# Patient Record
Sex: FEMALE | Race: BLACK OR AFRICAN AMERICAN | NOT HISPANIC OR LATINO | Employment: FULL TIME | URBAN - METROPOLITAN AREA
[De-identification: names, ages, dates, MRNs, and addresses within clinical notes are randomized per-mention and may not be internally consistent; named-entity substitution may affect disease eponyms.]

---

## 2019-04-17 ENCOUNTER — APPOINTMENT (OUTPATIENT)
Dept: WOMENS IMAGING | Facility: HOSPITAL | Age: 49
End: 2019-04-17

## 2019-04-17 PROCEDURE — 77067 SCR MAMMO BI INCL CAD: CPT | Performed by: RADIOLOGY

## 2019-04-17 PROCEDURE — 77063 BREAST TOMOSYNTHESIS BI: CPT | Performed by: RADIOLOGY

## 2019-05-13 ENCOUNTER — APPOINTMENT (OUTPATIENT)
Dept: WOMENS IMAGING | Facility: HOSPITAL | Age: 49
End: 2019-05-13

## 2019-05-13 PROCEDURE — 77065 DX MAMMO INCL CAD UNI: CPT | Performed by: RADIOLOGY

## 2019-05-13 PROCEDURE — G0279 TOMOSYNTHESIS, MAMMO: HCPCS | Performed by: RADIOLOGY

## 2019-05-13 PROCEDURE — 77061 BREAST TOMOSYNTHESIS UNI: CPT | Performed by: RADIOLOGY

## 2019-05-13 PROCEDURE — 76641 ULTRASOUND BREAST COMPLETE: CPT | Performed by: RADIOLOGY

## 2019-06-25 ENCOUNTER — APPOINTMENT (OUTPATIENT)
Dept: WOMENS IMAGING | Facility: HOSPITAL | Age: 49
End: 2019-06-25

## 2019-06-25 PROCEDURE — 19083 BX BREAST 1ST LESION US IMAG: CPT | Performed by: RADIOLOGY

## 2019-06-25 PROCEDURE — 19081 BX BREAST 1ST LESION STRTCTC: CPT | Performed by: RADIOLOGY

## 2019-06-26 ENCOUNTER — TELEPHONE (OUTPATIENT)
Dept: SURGERY | Facility: CLINIC | Age: 49
End: 2019-06-26

## 2019-07-01 ENCOUNTER — TELEPHONE (OUTPATIENT)
Dept: SURGERY | Facility: CLINIC | Age: 49
End: 2019-07-01

## 2019-07-01 DIAGNOSIS — C50.412 MALIGNANT NEOPLASM OF UPPER-OUTER QUADRANT OF LEFT BREAST IN FEMALE, ESTROGEN RECEPTOR POSITIVE (HCC): Primary | ICD-10-CM

## 2019-07-01 DIAGNOSIS — Z17.0 MALIGNANT NEOPLASM OF UPPER-OUTER QUADRANT OF LEFT BREAST IN FEMALE, ESTROGEN RECEPTOR POSITIVE (HCC): Primary | ICD-10-CM

## 2019-07-01 NOTE — TELEPHONE ENCOUNTER
Scheduled Bilateral Breast Mri wo w contrast  [facility] 7-8-19 arrive 8:15    Spoke to pt she v/u with appt  sylvial

## 2019-07-08 ENCOUNTER — TELEPHONE (OUTPATIENT)
Dept: SURGERY | Facility: CLINIC | Age: 49
End: 2019-07-08

## 2019-07-08 ENCOUNTER — HOSPITAL ENCOUNTER (OUTPATIENT)
Dept: MRI IMAGING | Facility: HOSPITAL | Age: 49
Discharge: HOME | End: 2019-07-08
Admitting: SURGERY

## 2019-07-08 DIAGNOSIS — Z17.0 MALIGNANT NEOPLASM OF UPPER-OUTER QUADRANT OF LEFT BREAST IN FEMALE, ESTROGEN RECEPTOR POSITIVE (HCC): ICD-10-CM

## 2019-07-08 DIAGNOSIS — C50.412 MALIGNANT NEOPLASM OF UPPER-OUTER QUADRANT OF LEFT BREAST IN FEMALE, ESTROGEN RECEPTOR POSITIVE (HCC): ICD-10-CM

## 2019-07-08 PROCEDURE — 0 GADOBENATE DIMEGLUMINE 529 MG/ML SOLUTION: Performed by: SURGERY

## 2019-07-08 PROCEDURE — 77049 MRI BREAST C-+ W/CAD BI: CPT

## 2019-07-08 PROCEDURE — A9577 INJ MULTIHANCE: HCPCS | Performed by: SURGERY

## 2019-07-08 RX ADMIN — GADOBENATE DIMEGLUMINE 16 ML: 529 INJECTION, SOLUTION INTRAVENOUS at 09:55

## 2019-07-12 ENCOUNTER — OFFICE VISIT (OUTPATIENT)
Dept: SURGERY | Facility: CLINIC | Age: 49
End: 2019-07-12

## 2019-07-12 ENCOUNTER — PREP FOR SURGERY (OUTPATIENT)
Dept: OTHER | Facility: HOSPITAL | Age: 49
End: 2019-07-12

## 2019-07-12 ENCOUNTER — TELEPHONE (OUTPATIENT)
Dept: SURGERY | Facility: CLINIC | Age: 49
End: 2019-07-12

## 2019-07-12 ENCOUNTER — TRANSCRIBE ORDERS (OUTPATIENT)
Dept: SURGERY | Facility: CLINIC | Age: 49
End: 2019-07-12

## 2019-07-12 VITALS
OXYGEN SATURATION: 99 % | SYSTOLIC BLOOD PRESSURE: 118 MMHG | BODY MASS INDEX: 24.88 KG/M2 | WEIGHT: 168 LBS | HEART RATE: 82 BPM | HEIGHT: 69 IN | DIASTOLIC BLOOD PRESSURE: 66 MMHG

## 2019-07-12 DIAGNOSIS — C50.412 MALIGNANT NEOPLASM OF UPPER-OUTER QUADRANT OF LEFT BREAST IN FEMALE, ESTROGEN RECEPTOR POSITIVE (HCC): Primary | ICD-10-CM

## 2019-07-12 DIAGNOSIS — Z17.0 MALIGNANT NEOPLASM OF UPPER-OUTER QUADRANT OF LEFT BREAST IN FEMALE, ESTROGEN RECEPTOR POSITIVE (HCC): Primary | ICD-10-CM

## 2019-07-12 DIAGNOSIS — Z80.3 FH: BREAST CANCER: ICD-10-CM

## 2019-07-12 DIAGNOSIS — R92.1 BREAST CALCIFICATIONS ON MAMMOGRAM: ICD-10-CM

## 2019-07-12 DIAGNOSIS — N60.92 ATYPICAL DUCTAL HYPERPLASIA OF LEFT BREAST: ICD-10-CM

## 2019-07-12 PROCEDURE — 99244 OFF/OP CNSLTJ NEW/EST MOD 40: CPT | Performed by: SURGERY

## 2019-07-12 RX ORDER — MULTIVITAMIN
1 TABLET ORAL DAILY
COMMUNITY

## 2019-07-12 RX ORDER — CEFAZOLIN SODIUM 2 G/100ML
2 INJECTION, SOLUTION INTRAVENOUS ONCE
Status: CANCELLED | OUTPATIENT
Start: 2019-07-12 | End: 2019-07-12

## 2019-07-12 RX ORDER — SODIUM CHLORIDE, SODIUM LACTATE, POTASSIUM CHLORIDE, CALCIUM CHLORIDE 600; 310; 30; 20 MG/100ML; MG/100ML; MG/100ML; MG/100ML
100 INJECTION, SOLUTION INTRAVENOUS CONTINUOUS
Status: CANCELLED | OUTPATIENT
Start: 2019-08-15

## 2019-07-12 RX ORDER — DIAZEPAM 5 MG/1
10 TABLET ORAL ONCE
Status: CANCELLED | OUTPATIENT
Start: 2019-08-15 | End: 2019-07-12

## 2019-07-12 NOTE — TELEPHONE ENCOUNTER
Emailed Edilia to contact patient.    Scheduled Consult with Dr Levine  7-15-19 @ 10:00    Scheduled Surgery Main OR  8-15-19  Lt Total Mastectomy, Lt Axilla SN Bx, Possible Node Dissection,  Immediate Reconstruction, Dr. Levine      Arrive              7:30        [facility]                    9:30  Surgery         10:30      PAT  8-5-19  @ 9:30      Return to see Dr SANCHEZ 8-27-19 arrive 8:15    Lm for Mikala santana to see if she can assist.    kdl    Mikala santana to assist.    Pt v/u with appts  kdl

## 2019-07-12 NOTE — PROGRESS NOTES
Chief Complaint: Екатерина Strauss is a 49 y.o. female who was seen in consultation at the request of  Jim Rodríguez II, MD   for newly diagnosed breast cancer    History of Present Illness:  Patient presents with newly diagnosed breast cancer, LEFT breast She noted no new masses, skin changes, nipple discharge, nipple changes prior to her most recent imaging.  Her most recent imaging includes the followin2019  [facility]   Mammo  Екатерина Strauss  Seen for yearly screening.  BILATERAL SCREENING MAMMOGRAM WITH TOMOSYNTHESIS  Heterogeneously dense.  Finding 1: Focal asymmetry 12 millimeters with multiple associated calcifications grouped distribution and architectural distortion middle one-third 12:30 left breast located 6 centimeters from the nipple. New since prior.  Finding 2: Stable area of asymmetric breast tissue posterior one-third lateral region of the right breast. No significant change.  IMPRESSION:  Finding 1: Left breast.  Finding 2: Benign-negative.  BIRADS Category 0    2019  [facility]   Radiology  Екатерина Strauss  LEFT DIAGNOSTIC MAMMOGRAM WITH TOMOSYNTHESIS  Heterogeneously dense.  Finding 1: Focal asymmetry in the left breast, 12:30. New focal asymmetry measuring 12 millimeters with multiple associated indistinct calcifications with grouped distribution and architectural distortion in the middle one-third 12:30 left breast located 4 centimeters from the nipple.  Finding 2: Several fine granular and indistinct calcifications measuring 7 millimeters middle one-third of the left breast at 12-1:00, central located 6 centimeters from the nipple. 4 cm inferior to the dominant finding described above.  Finding 3: Several groups of indistinct calcifications seen in the middle one-third of the left breast at 12-1:00, central. Flank the findings described above, spanning 6 to 7 cm in CC dimension.  LEFT BREAST ULTRASOUND  Finding 1: Irregular solid mass with  indistinct margins measuring 11 x 8 x 10 mm.  Finding 4: Oval very small complicated cyst 4 x 2 x 4 mm left breast at 4:00 located 3 centimeters from the nipple.  Finding 5: Oval very small simple cyst, 4 millimeters left breast at 11:00.  Finding 6: Oval complicated cyst 5 x 3 x 4 mm left breast at 12:00 located 2 centimeters from the nipple.  IMPRESSION:  Finding 1: Mass left breast is suspicious.  Finding 2: Calcifications left breast at 12-1:00.  Finding 3: Calcifications middle one-third of the left breast at 12-1:00, suspicious.  Finding 4: Complicated cyst breast at 4:00 located 3 centimeters from the nipple is suspicious.  Finding 5: Left breast at 11:00 benign-negative.  Finding 6: Complicated cyst in the left breast at 12:00 located 2 centimeters from the nipple is suspicious.  BIRADS Category 4C    07/08/2019  [facility]    Radiology  Екатерина Strauss  12:00 left breast middle third on 6 cm from the nipple metallic clip centrally located within an irregular mass 1.5 cm anterior to posterior, 0.8 cm superior to inferior 1.2 cm medial lateral. The metallic clip represents the most posterior of the 2 barrel-shaped metallic clips. The more anterior barrel-shaped clips is located within a post biopsy hematoma cavity.  3:00 middle third 5 cm from the nipple is a metallic clip within postbiopsy hematoma cavity. This represents the top hat shaped metallic clip. No suspicious residual enhancement. The hematoma measures 4.0 cm.  No other areas of abnormal enhancement left breast. No left axillary internal mammary chain adenopathy.  Scattered enhancement right breast. 0.8 cm oval circumscribed weakly enhancing mass consistent with a fibroadenoma retroareolar region of the right breast at the 6:00.      She had a biopsy on the following day that showed:     06/25/2019  [facility]   Radiology  Екатерина Strauss  LEFT UTLRASOUND GUIDED BIOPSY  12:00 position left breast.  12-gauge. 20 cores. Minicork  shaped s-anjum tissue marker was placed.  ADDENDUM:  Pathology is concordant.  That this mass was marked with two “barrel” shaped clip.  The mammogram demonstrated several similar groups of calcifications covering a length of approximately 8 cm. Recommend a breast MRI.  In addition the patient had a small nodule that appeared to possibly be a complicated cyst that was attempted to be aspirated but it would not aspirate. It had overall rather benign features. If it was thought to be helpful by biopsying any of the remaining groups of microcalcifcations or this small solid nodule if it would  we would be very happy to do so.  Please note that initially there was a small complex appearing nodule at the 12:00 position for which aspiration was recommended but this appeared to have been sampled with the stereotactic biopsy as it greatly diminished in size. The small nodule at the 4:00 position was solid and fairly benign in appearance and it was elected to do a six month follow up and again if it would  I would be happy to do a core biopsy of this.    06/25/2019  [facility]   Biopsy   Екатерина Strauss  Left breast at the 12:00. 12 core needle biopsy specimens were obtained using a 9-gauge vacuum assisted device. Top hat shaped s-anjum Eviva tissue marker was deployed.  Pathology calcifications at the 12-1:00 represents (ADH). This is concordant.  Ultrasound guided core biopsy of a mass at the 12:30. These areas are approximately 3 cm apart in the ML projection and 2 cm apart in the CC projection. The patient also had a complicated cyst at the 12:00 position was recommended for aspiration but prior to the attempted aspiration the cyst was no longer present and it was thought to have been removed with the stereotactic biopsy. There was a small complicated appearing cyst at the 4:00 position that was attempted to be aspirated but it now seemed to be solid and was left for a follow  up ultrasound in six months. I would recommend a breast MRI.    06/25/2019  [facility]     Pathology  Екатерина Strauss  DIAGNOSIS  1. Left Breast, 12-1:00, Core Biopsies:  - Single Focus of Atypical Ductal Hyperplasia (ADH), Solid and Cribriform Type, 0.1 cm in Dimension.  - Florid Hyperplasia of Usual Type, Columnar Cell Hyperplasia, Apocrine Change, and Fibrocystic Changes are Also Present.  - Microcalcifications Are Present in Fibroproliferative Changes and in ADH.  2. Left Breast, 12:30, Core Biopsies:  - Invasive Mammary Carcinoma, No Special Type (Ductal), Low Grade, Measuring at Least 0.9 cm in Dimension with Limited Associated Low Grade Cribriform Ductal Carcinoma in Situ (DCIS).  - Microcalcifications Are Present Within the Invasive Carcinoma and in Adjacent Benign Breast Parenchyma with Fibrocystic Changes.  Low grade morphology (tubules = 1, nuclear atypia = 2, and mitoses = 1).    06/25/2019  [facility]     Marker Analysis Екатерина Strauss   ER - 90.78%  MA - 94.21%  HER2 - 1+  Ki-67 - 5.7%    She has not had a breast biopsy in the past.  She has her ovaries, had her uterus removed for bleeding, and takes nor hormones.  Her family history includes the following:   She has 1 daughter, 1 son, no sisters, one paternal aunt, 3 maternal aunts.  She has a paternal great aunt who had breast cancer at uncertain age.  She is here for evaluation.    Review of Systems:  Review of Systems   All other systems reviewed and are negative.       Past Medical and Surgical History:  Breast Biopsy History:  Patient had not had a breast biopsy prior to her cancer diagnosis.  Breast Cancer HIstory:  Patient does not have a past medical history of breast cancer.  Breast Operations, and year:  NONE   Obstetric/Gynecologic History:  Age menstrual periods began: 10  Patient is postmenopausal due to removal of her uterus in the following year: 2008   Number of pregnancies: 4  Number of live births: 2  Number of abortions or miscarriages: 2  Age  "of delivery of first child: 23  Patient breast fed, for the following lenth of time: 3-4 MONTHS PER CHILD.   Length of time taking birth control pills: 20 YRS.   Patient has never taken hormone replacement  UTERUS REMOVED 2008.     Past Surgical History:   Procedure Laterality Date   • BREAST BIOPSY     • DILATATION AND CURETTAGE     • FOOT SURGERY     • HYSTERECTOMY     • TIBIA FRACTURE SURGERY     • WISDOM TOOTH EXTRACTION         Past Medical History:   Diagnosis Date   • Fibrocystic breast    • Seasonal allergies        Prior Hospitalizations, other than for surgery or childbirth, and year:  NONE     Social History     Socioeconomic History   • Marital status: Single     Spouse name: Not on file   • Number of children: Not on file   • Years of education: Not on file   • Highest education level: Not on file   Tobacco Use   • Smoking status: Never Smoker   • Smokeless tobacco: Never Used   Substance and Sexual Activity   • Alcohol use: Yes     Comment: 3 PER MONTH-SOCIALLY   • Drug use: No     Patient is .  Patient is employed full time with the following occupation: CUSTOMER SERVICE  Patient drinks 2 servings of caffeine per day.    Family History:  Family History   Problem Relation Age of Onset   • Diabetes Mother    • Cancer Maternal Aunt         OF UNKNOWN ORGIN   • Diabetes Maternal Aunt    • Breast cancer Paternal Aunt         PATERNAL GREAT AUNT-UNKNOLWN AGE    • Lung cancer Maternal Grandmother    • Brain cancer Maternal Grandmother        Vital Signs:  /66 (BP Location: Right arm, Patient Position: Sitting, Cuff Size: Adult)   Pulse 82   Ht 174 cm (68.5\")   Wt 76.2 kg (168 lb)   LMP  (LMP Unknown)   SpO2 99%   Breastfeeding? No   BMI 25.17 kg/m²      Medications:    Current Outpatient Medications:   •  loratadine-pseudoephedrine (CLARITIN-D 24 HOUR)  MG per 24 hr tablet, Take 1 tablet by mouth Daily., Disp: , Rfl:   •  Multiple Vitamin (MULTIVITAMIN) tablet, Take 1 tablet by " "mouth Daily., Disp: , Rfl:      Allergies:  Allergies   Allergen Reactions   • Codeine Rash     As a child, does not recall the severity of the reaction       Physical Examination:  /66 (BP Location: Right arm, Patient Position: Sitting, Cuff Size: Adult)   Pulse 82   Ht 174 cm (68.5\")   Wt 76.2 kg (168 lb)   LMP  (LMP Unknown)   SpO2 99%   Breastfeeding? No   BMI 25.17 kg/m²   General Appearance:  Patient is in no distress.  She is well kept and has an thin build.   Psychiatric:  Patient with appropriate mood and affect. Alert and oriented to self, time, and place.    Breast, RIGHT:  medium sized, 34D, symmetric with the contralateral side.  Breast skin is without erythema, edema, rashes.  There are no visible abnormalities upon inspection during the arm-raising maneuver or with hands on hips in the sitting position. There is no nipple retraction, discharge or nipple/areolar skin changes.There are no masses palpable in the sitting or supine positions.    Breast, LEFT:  medium sized, 34D,  symmetric with the contralateral side.  Breast skin is without erythema, edema, rashes.  There are no visible abnormalities upon inspection during the arm-raising maneuver or with hands on hips in the sitting position. There is no nipple retraction, discharge or nipple/areolar skin changes.there are palpable hematomas left breast 2:00 inner ring.  There are 2 well-healed mammotomy's from her recent percutaneous biopsy site.  The area of biopsy and hematoma measures 6 cm at the 12-3 o'clock inner to middle ring location.  There are no other masses palpable in the sitting or supine positions.    Lymphatic:  There is no axillary, cervical, infraclavicular, or supraclavicular adenopathy bilaterally.  Eyes:  Pupils are round and reactive to light.  Cardiovascular:  Heart rate and rhythm are regular.  Respiratory:  Lungs are clear bilaterally with no crackles or wheezes in any lung field.  Gastrointestinal:  Abdomen is " soft, nondistended, and nontender. There are no scars from previous surgery.    Musculoskeletal:  Good strength in all 4 extremities.   There is good range of motion in both shoulders.    Skin:  No new skin lesions or rashes on the skin excluding the breast (see breast exam above).        Imagin2016  [facility]   Mammo  Екатеринаnile Adamsrer  BILATERAL MAMMOGRAPHY  Heterogeneously dense.  Finding 1: Stable area of asymmetric breast tissue with associated punctate calcifications regional distribution superior region of the left breast. No significant change.  Finding 2: There stable areas of asymmetric breast tissue seen in both breasts.  BIRADS Category 2, Benign.    2019  [facility]   Mammo  Екатерина Strauss  Seen for yearly screening.  BILATERAL SCREENING MAMMOGRAM WITH TOMOSYNTHESIS  Heterogeneously dense.  Finding 1: Focal asymmetry 12 millimeters with multiple associated calcifications grouped distribution and architectural distortion middle one-third 12:30 left breast located 6 centimeters from the nipple. New since prior.  Finding 2: Stable area of asymmetric breast tissue posterior one-third lateral region of the right breast. No significant change.  IMPRESSION:  Finding 1: Left breast.  Finding 2: Benign-negative.  BIRADS Category 0    2019  [facility]   Radiology  Екатеринаnile Strauss  LEFT DIAGNOSTIC MAMMOGRAM WITH TOMOSYNTHESIS  Heterogeneously dense.  Finding 1: Focal asymmetry in the left breast, 12:30. New focal asymmetry measuring 12 millimeters with multiple associated indistinct calcifications with grouped distribution and architectural distortion in the middle one-third 12:30 left breast located 4 centimeters from the nipple.  Finding 2: Several fine granular and indistinct calcifications measuring 7 millimeters middle one-third of the left breast at 12-1:00, central located 6 centimeters from the nipple. 4 cm inferior to the dominant finding described  above.  Finding 3: Several groups of indistinct calcifications seen in the middle one-third of the left breast at 12-1:00, central. Flank the findings described above, spanning 6 to 7 cm in CC dimension.  LEFT BREAST ULTRASOUND  Finding 1: Irregular solid mass with indistinct margins measuring 11 x 8 x 10 mm.  Finding 4: Oval very small complicated cyst 4 x 2 x 4 mm left breast at 4:00 located 3 centimeters from the nipple.  Finding 5: Oval very small simple cyst, 4 millimeters left breast at 11:00.  Finding 6: Oval complicated cyst 5 x 3 x 4 mm left breast at 12:00 located 2 centimeters from the nipple.  IMPRESSION:  Finding 1: Mass left breast is suspicious.  Finding 2: Calcifications left breast at 12-1:00.  Finding 3: Calcifications middle one-third of the left breast at 12-1:00, suspicious.  Finding 4: Complicated cyst breast at 4:00 located 3 centimeters from the nipple is suspicious.  Finding 5: Left breast at 11:00 benign-negative.  Finding 6: Complicated cyst in the left breast at 12:00 located 2 centimeters from the nipple is suspicious.  BIRADS Category 4C    07/08/2019  [facility]    Radiology  Екатерина Strauss  12:00 left breast middle third on 6 cm from the nipple metallic clip centrally located within an irregular mass 1.5 cm anterior to posterior, 0.8 cm superior to inferior 1.2 cm medial lateral. The metallic clip represents the most posterior of the 2 barrel-shaped metallic clips. The more anterior barrel-shaped clips is located within a post biopsy hematoma cavity.  3:00 middle third 5 cm from the nipple is a metallic clip within postbiopsy hematoma cavity. This represents the top hat shaped metallic clip. No suspicious residual enhancement. The hematoma measures 4.0 cm.  No other areas of abnormal enhancement left breast. No left axillary internal mammary chain adenopathy.  Scattered enhancement right breast. 0.8 cm oval circumscribed weakly enhancing mass consistent with a fibroadenoma  retroareolar region of the right breast at the 6:00.    Pathology:    06/25/2019  [facility]   Radiology  Екатерина Strauss  LEFT UTLRASOUND GUIDED BIOPSY  12:00 position left breast.  12-gauge. 20 cores. Minicork shaped s-anjum tissue marker was placed.  ADDENDUM:  Pathology is concordant.  That this mass was marked with two “barrel” shaped clip.  The mammogram demonstrated several similar groups of calcifications covering a length of approximately 8 cm. Recommend a breast MRI.  In addition the patient had a small nodule that appeared to possibly be a complicated cyst that was attempted to be aspirated but it would not aspirate. It had overall rather benign features. If it was thought to be helpful by biopsying any of the remaining groups of microcalcifcations or this small solid nodule if it would  we would be very happy to do so.  Please note that initially there was a small complex appearing nodule at the 12:00 position for which aspiration was recommended but this appeared to have been sampled with the stereotactic biopsy as it greatly diminished in size. The small nodule at the 4:00 position was solid and fairly benign in appearance and it was elected to do a six month follow up and again if it would  I would be happy to do a core biopsy of this.    06/25/2019  [facility]   Biopsy   Екатерина Strauss  Left breast at the 12:00. 12 core needle biopsy specimens were obtained using a 9-gauge vacuum assisted device. Top hat shaped s-anjum Eviva tissue marker was deployed.  Pathology calcifications at the 12-1:00 represents (ADH). This is concordant.  Ultrasound guided core biopsy of a mass at the 12:30. These areas are approximately 3 cm apart in the ML projection and 2 cm apart in the CC projection. The patient also had a complicated cyst at the 12:00 position was recommended for aspiration but prior to the attempted aspiration the cyst was no longer present and  it was thought to have been removed with the stereotactic biopsy. There was a small complicated appearing cyst at the 4:00 position that was attempted to be aspirated but it now seemed to be solid and was left for a follow up ultrasound in six months. I would recommend a breast MRI.    06/25/2019  [facility]     Pathology  Екатерина Waldroperson  DIAGNOSIS  3. Left Breast, 12-1:00, Core Biopsies:  - Single Focus of Atypical Ductal Hyperplasia (ADH), Solid and Cribriform Type, 0.1 cm in Dimension.  - Florid Hyperplasia of Usual Type, Columnar Cell Hyperplasia, Apocrine Change, and Fibrocystic Changes are Also Present.  - Microcalcifications Are Present in Fibroproliferative Changes and in ADH.  4. Left Breast, 12:30, Core Biopsies:  - Invasive Mammary Carcinoma, No Special Type (Ductal), Low Grade, Measuring at Least 0.9 cm in Dimension with Limited Associated Low Grade Cribriform Ductal Carcinoma in Situ (DCIS).  - Microcalcifications Are Present Within the Invasive Carcinoma and in Adjacent Benign Breast Parenchyma with Fibrocystic Changes.  Low grade morphology (tubules = 1, nuclear atypia = 2, and mitoses = 1).    06/25/2019  [facility]     Marker Analysis Екатерина Carlos A   ER - 90.78%  [place] - 94.21%  HER2 - 1+  Ki-67 - 5.7%    Procedures:      Assessment:   Diagnosis Plan   1. Malignant neoplasm of upper-outer quadrant of left breast in female, estrogen receptor positive (CMS/HCC)     2. Atypical ductal hyperplasia of left breast     3. Breast calcifications on mammogram     4. FH: breast cancer     1-3   LEFT breast biopsy sites on bedside ultrasound at the LEFT 12:00, 5 CFN location and at the LEFT 2:00, 4 CFN location.     At LEFT breast 12:00, 5 CFN location- Initially asymetry and 1.5 cm calcifications on mammo, 1.1 cm mass on ultrasound.  X2- MRI enhancement at the more posterior barrel measuring 1.5 cm  IMC, NST, low grade, 1,2,1, 9mm, associated low grade DCIS,   ER91, [place] 94, her 2 artemio 1+, ki 67 is 5.7%.    2-LEFT  breast 2:00, 4 CFN location- calcifications  4 cm inferior to above- tophat marker--single focus ADH, solid and cribiform, 1mm, usual hyerplasia, CCC, apocrine chagnes, FCC    - Note additional calcs int he same region (absent on contralateral breast )spanning 8 cm total.    Clinical stage T1cN0- IA   (possibly multifocal, with at least atypia spanning 5 cm from clip to clip)    4-  PGA age uncertain        Plan:  Екатерина, her daughter Jenna and I reviewed her history, imaging, imaging reports and exam together today.    We reviewed the difference in systemic therapy versus locoregional. She knows that she will be seeing a medical oncologist in the adjuvant setting. We discussed that for early stage breast cancer, there are 2 options for locoregional treatment that have equivalent survival: total mastectomy versus lumpectomy and radiation, either with sentinel node biopsy. Based on her imaging and examination, we discussed that a unilateral mastectomy with or without reconstruction would be the recommended surgical treatment. She understands the risks of mastectomy including bleeding, infection, seroma and chance of skin ischemia/necrosis. She understands the risks of  sentinel node biopsy, including 7% chance of lymphedema, injury to nerves or vessels in the axilla,  seroma. We discussed that we will proceed with a frozen section analysis of the sentinel node in the operating room, and if any positivity, would proceed with a completion axillary dissection at that time.    LEFt total mastectomy, LEFT axilla sentinel node biopsy, possible axillary node dissection, immediate recosntruction    We discussed her having a plastic surgical consultation in the preoperative period, and have arranged that today.   She is interested in a TRAM flap she thinks, over implant.      NCCN guidelines have been followed.  We gave her EMLA cream and tegederm for her sentinel node procedure, and arranged for her to see our nurse  navigator.   She will receive a recommendation about radiation after surgery. I am hopeful that she will not need radiation.    We have sent genetics due to her young age and FH and will see this preoperatively.        Sarah Barker MD        We have spent 60 minutes in visit today, 45 in face to face .      Next Appointment:  Return for surgery.      EMR Dragon/transcription disclaimer:    Much of this encounter note is an electronic transcription/translocation of spoken language to printed text.  The electronic translation of spoken language may permit erroneous, or at times, nonsensical words or phrases to be inadvertently transcribed.  Although I have reviewed the note from such areas, some may still exist.

## 2019-07-15 ENCOUNTER — TELEPHONE (OUTPATIENT)
Dept: SURGERY | Facility: CLINIC | Age: 49
End: 2019-07-15

## 2019-07-15 NOTE — TELEPHONE ENCOUNTER
EMLA Cream 30 g tube no refills   Apply topically once for one dose to affected nipple, outer edge of affected nipple and cover day of surgery before leaving home.     Bringing remaining cream with to hospital day of surgery.     [place] [id number] AT [facility] [address] - [phone]  - [phone] (Phone)     lml

## 2019-07-16 ENCOUNTER — TELEPHONE (OUTPATIENT)
Dept: SURGERY | Facility: CLINIC | Age: 49
End: 2019-07-16

## 2019-07-16 NOTE — TELEPHONE ENCOUNTER
[facility] panel 7-9-19 Breast and Gyn panel- VUS ion BARD1 [id number].    We will let her know no mutation found.

## 2019-07-17 ENCOUNTER — TELEPHONE (OUTPATIENT)
Dept: SURGERY | Facility: CLINIC | Age: 49
End: 2019-07-17

## 2019-07-17 ENCOUNTER — TELEPHONE (OUTPATIENT)
Dept: OTHER | Facility: HOSPITAL | Age: 49
End: 2019-07-17

## 2019-07-17 NOTE — TELEPHONE ENCOUNTER
Referral received from Dr. Barker's office. I called Ms. Strauss and introduced myself and navigational services. She states she is scheduled for a Mastectomy Aug 15th and will have her pre admission testing done on the Aug 5th. She was agreeable to meet afterward to go over navigational services and resources available. She has my contact number if any needs arise.

## 2019-08-05 ENCOUNTER — HOSPITAL ENCOUNTER (OUTPATIENT)
Dept: GENERAL RADIOLOGY | Facility: HOSPITAL | Age: 49
Discharge: HOME | End: 2019-08-05
Admitting: SURGERY

## 2019-08-05 ENCOUNTER — APPOINTMENT (OUTPATIENT)
Dept: PREADMISSION TESTING | Facility: HOSPITAL | Age: 49
End: 2019-08-05

## 2019-08-05 VITALS
HEART RATE: 74 BPM | BODY MASS INDEX: 25.33 KG/M2 | SYSTOLIC BLOOD PRESSURE: 115 MMHG | TEMPERATURE: 99.4 F | OXYGEN SATURATION: 100 % | RESPIRATION RATE: 20 BRPM | DIASTOLIC BLOOD PRESSURE: 75 MMHG | HEIGHT: 69 IN | WEIGHT: 171 LBS

## 2019-08-05 DIAGNOSIS — Z17.0 MALIGNANT NEOPLASM OF UPPER-OUTER QUADRANT OF LEFT BREAST IN FEMALE, ESTROGEN RECEPTOR POSITIVE (HCC): ICD-10-CM

## 2019-08-05 DIAGNOSIS — C50.412 MALIGNANT NEOPLASM OF UPPER-OUTER QUADRANT OF LEFT BREAST IN FEMALE, ESTROGEN RECEPTOR POSITIVE (HCC): ICD-10-CM

## 2019-08-05 LAB
ALBUMIN SERPL-MCNC: 4.1 G/DL (ref 3.5–5.2)
ALBUMIN/GLOB SERPL: 1.1 G/DL
ALP SERPL-CCNC: 63 U/L (ref 39–117)
ALT SERPL W P-5'-P-CCNC: 6 U/L (ref 1–33)
ANION GAP SERPL CALCULATED.3IONS-SCNC: 11.6 MMOL/L (ref 5–15)
AST SERPL-CCNC: 14 U/L (ref 1–32)
BILIRUB SERPL-MCNC: 0.4 MG/DL (ref 0.2–1.2)
BUN BLD-MCNC: 10 MG/DL (ref 6–20)
BUN/CREAT SERPL: 13 (ref 7–25)
CALCIUM SPEC-SCNC: 9.5 MG/DL (ref 8.6–10.5)
CHLORIDE SERPL-SCNC: 102 MMOL/L (ref 98–107)
CO2 SERPL-SCNC: 26.4 MMOL/L (ref 22–29)
CREAT BLD-MCNC: 0.77 MG/DL (ref 0.57–1)
DEPRECATED RDW RBC AUTO: 44.6 FL (ref 37–54)
ERYTHROCYTE [DISTWIDTH] IN BLOOD BY AUTOMATED COUNT: 13.1 % (ref 12.3–15.4)
GFR SERPL CREATININE-BSD FRML MDRD: 97 ML/MIN/1.73
GLOBULIN UR ELPH-MCNC: 3.7 GM/DL
GLUCOSE BLD-MCNC: 94 MG/DL (ref 65–99)
HCT VFR BLD AUTO: 44.3 % (ref 34–46.6)
HGB BLD-MCNC: 14.1 G/DL (ref 12–15.9)
MCH RBC QN AUTO: 29.7 PG (ref 26.6–33)
MCHC RBC AUTO-ENTMCNC: 31.8 G/DL (ref 31.5–35.7)
MCV RBC AUTO: 93.3 FL (ref 79–97)
PLATELET # BLD AUTO: 222 10*3/MM3 (ref 140–450)
PMV BLD AUTO: 11.5 FL (ref 6–12)
POTASSIUM BLD-SCNC: 4.2 MMOL/L (ref 3.5–5.2)
PROT SERPL-MCNC: 7.8 G/DL (ref 6–8.5)
RBC # BLD AUTO: 4.75 10*6/MM3 (ref 3.77–5.28)
SODIUM BLD-SCNC: 140 MMOL/L (ref 136–145)
WBC NRBC COR # BLD: 5.69 10*3/MM3 (ref 3.4–10.8)

## 2019-08-05 PROCEDURE — 36415 COLL VENOUS BLD VENIPUNCTURE: CPT

## 2019-08-05 PROCEDURE — 85027 COMPLETE CBC AUTOMATED: CPT | Performed by: SURGERY

## 2019-08-05 PROCEDURE — 71046 X-RAY EXAM CHEST 2 VIEWS: CPT

## 2019-08-05 PROCEDURE — 80053 COMPREHEN METABOLIC PANEL: CPT | Performed by: SURGERY

## 2019-08-05 NOTE — DISCHARGE INSTRUCTIONS
Take the following medications the morning of surgery with a small sip of water: NONE    PLEASE ARRIVE AT THE  HOSPITAL AT: 0730 AM    General Instructions:  • Do not eat solid food after midnight the night before surgery.  • You may drink clear liquids day of surgery but must stop at least one hour before your hospital arrival time.  • It is beneficial for you to have a clear drink that contains carbohydrates the day of surgery.  We suggest a 12 to 20 ounce bottle of Gatorade or Powerade for non-diabetic patients or a 12 to 20 ounce bottle of G2 or Powerade Zero for diabetic patients. (Pediatric patients, are not advised to drink a 12 to 20 ounce carbohydrate drink)    Clear liquids are liquids you can see through.  Nothing red in color.     Plain water                               Sports drinks  Sodas                                   Gelatin (Jell-O)  Fruit juices without pulp such as white grape juice and apple juice  Popsicles that contain no fruit or yogurt  Tea or coffee (no cream or milk added)  Gatorade / Powerade  G2 / Powerade Zero    • Infants may have breast milk up to four hours before surgery.  • Infants drinking formula may drink formula up to six hours before surgery.   • Patients who avoid smoking, chewing tobacco and alcohol for 4 weeks prior to surgery have a reduced risk of post-operative complications.  Quit smoking as many days before surgery as you can.  • Do not smoke, use chewing tobacco or drink alcohol the day of surgery.   • If applicable bring your C-PAP/ BI-PAP machine.  • Bring any papers given to you in the doctor’s office.  • Wear clean comfortable clothes and socks.  • Do not wear contact lenses, false eyelashes or make-up.  Bring a case for your glasses.   • Bring crutches or walker if applicable.  • Remove all piercings.  Leave jewelry and any other valuables at home.  • Hair extensions with metal clips must be removed prior to surgery.  • The Pre-Admission Testing nurse will  instruct you to bring medications if unable to obtain an accurate list in Pre-Admission Testing.        If you were given a blood bank ID arm band remember to bring it with you the day of surgery.    Preventing a Surgical Site Infection:  • For 2 to 3 days before surgery, avoid shaving with a razor because the razor can irritate skin and make it easier to develop an infection.    • Any areas of open skin can increase the risk of a post-operative wound infection by allowing bacteria to enter and travel throughout the body.  Notify your surgeon if you have any skin wounds / rashes even if it is not near the expected surgical site.  The area will need assessed to determine if surgery should be delayed until it is healed.  • The night prior to surgery sleep in a clean bed with clean clothing.  Do not allow pets to sleep with you.  • Shower on the morning of surgery using a fresh bar of anti-bacterial soap (such as Dial) and clean washcloth.  Dry with a clean towel and dress in clean clothing.  • Ask your surgeon if you will be receiving antibiotics prior to surgery.  • Make sure you, your family, and all healthcare providers clean their hands with soap and water or an alcohol based hand  before caring for you or your wound.    Day of surgery:  Upon arrival, a Pre-op nurse and Anesthesiologist will review your health history, obtain vital signs, and answer questions you may have.  The only belongings needed at this time will be a list of your home medications and if applicable your C-PAP/BI-PAP machine.  If you are staying overnight your family can leave the rest of your belongings in the car and bring them to your room later.  A Pre-op nurse will start an IV and you may receive medication in preparation for surgery, including something to help you relax.  Your family will be able to see you in the Pre-op area.  While you are in surgery your family should notify the waiting room  if they leave the  waiting room area and provide a contact phone number.    Please be aware that surgery does come with discomfort.  We want to make every effort to control your discomfort so please discuss any uncontrolled symptoms with your nurse.   Your doctor will most likely have prescribed pain medications.      If you are going home after surgery you will receive individualized written care instructions before being discharged.  A responsible adult must drive you to and from the hospital on the day of your surgery and stay with you for 24 hours.    If you are staying overnight following surgery, you will be transported to your hospital room following the recovery period.  [facility] has all private rooms.    You have received a list of surgical assistants for your reference.  If you have any questions please call Pre-Admission Testing at [phone].  Deductibles and co-payments are collected on the day of service. Please be prepared to pay the required co-pay, deductible or deposit on the day of service as defined by your plan.

## 2019-08-07 ENCOUNTER — TELEPHONE (OUTPATIENT)
Dept: SURGERY | Facility: CLINIC | Age: 49
End: 2019-08-07

## 2019-08-07 NOTE — TELEPHONE ENCOUNTER
Patient scheduled for Left Mastectomy Left SLNB 8/15 with reconstruction Dr. Levine.     Patient would like additional surgical options and let me know she is considering second option.     We encourage second opinion if she is uncertain about her upcoming procedure. lml

## 2019-08-09 ENCOUNTER — TELEPHONE (OUTPATIENT)
Dept: SURGERY | Facility: CLINIC | Age: 49
End: 2019-08-09

## 2019-08-09 NOTE — TELEPHONE ENCOUNTER
Patient called me to let me know that she has decided to go with surgery as planned with us;   Lt total mastectomy, Lt SLNB, possible node dissection, immediate reconstruction Dr. Levine.     Lml

## 2019-08-13 ENCOUNTER — TELEPHONE (OUTPATIENT)
Dept: SURGERY | Facility: CLINIC | Age: 49
End: 2019-08-13

## 2019-08-15 ENCOUNTER — ANESTHESIA EVENT (OUTPATIENT)
Dept: PERIOP | Facility: HOSPITAL | Age: 49
End: 2019-08-15

## 2019-08-15 ENCOUNTER — ANESTHESIA (OUTPATIENT)
Dept: PERIOP | Facility: HOSPITAL | Age: 49
End: 2019-08-15

## 2019-08-15 ENCOUNTER — HOSPITAL ENCOUNTER (OUTPATIENT)
Facility: HOSPITAL | Age: 49
Discharge: HOME | End: 2019-08-16
Attending: SURGERY | Admitting: SURGERY

## 2019-08-15 ENCOUNTER — HOSPITAL ENCOUNTER (OUTPATIENT)
Dept: NUCLEAR MEDICINE | Facility: HOSPITAL | Age: 49
Discharge: HOME | End: 2019-08-15

## 2019-08-15 DIAGNOSIS — C50.412 MALIGNANT NEOPLASM OF UPPER-OUTER QUADRANT OF LEFT BREAST IN FEMALE, ESTROGEN RECEPTOR POSITIVE (HCC): ICD-10-CM

## 2019-08-15 DIAGNOSIS — Z17.0 MALIGNANT NEOPLASM OF UPPER-OUTER QUADRANT OF LEFT BREAST IN FEMALE, ESTROGEN RECEPTOR POSITIVE (HCC): ICD-10-CM

## 2019-08-15 PROCEDURE — 25010000002 GENTAMICIN PER 80 MG: Performed by: SURGERY

## 2019-08-15 PROCEDURE — 25010000002 PROPOFOL 10 MG/ML EMULSION: Performed by: NURSE ANESTHETIST, CERTIFIED REGISTERED

## 2019-08-15 PROCEDURE — C1789 PROSTHESIS, BREAST, IMP: HCPCS | Performed by: SURGERY

## 2019-08-15 PROCEDURE — 25010000002 DEXAMETHASONE PER 1 MG: Performed by: SURGERY

## 2019-08-15 PROCEDURE — 25010000003 CEFAZOLIN IN DEXTROSE 2-4 GM/100ML-% SOLUTION: Performed by: SURGERY

## 2019-08-15 PROCEDURE — 88360 TUMOR IMMUNOHISTOCHEM/MANUAL: CPT | Performed by: SURGERY

## 2019-08-15 PROCEDURE — 88342 IMHCHEM/IMCYTCHM 1ST ANTB: CPT | Performed by: SURGERY

## 2019-08-15 PROCEDURE — 38525 BIOPSY/REMOVAL LYMPH NODES: CPT | Performed by: SURGERY

## 2019-08-15 PROCEDURE — 25010000002 HYDROMORPHONE PER 4 MG: Performed by: NURSE ANESTHETIST, CERTIFIED REGISTERED

## 2019-08-15 PROCEDURE — 38792 RA TRACER ID OF SENTINL NODE: CPT

## 2019-08-15 PROCEDURE — 25010000003 CEFAZOLIN PER 500 MG: Performed by: SURGERY

## 2019-08-15 PROCEDURE — 88331 PATH CONSLTJ SURG 1 BLK 1SPC: CPT | Performed by: SURGERY

## 2019-08-15 PROCEDURE — 25010000002 ROPIVACAINE PER 1 MG: Performed by: SURGERY

## 2019-08-15 PROCEDURE — 25010000002 ONDANSETRON PER 1 MG: Performed by: NURSE ANESTHETIST, CERTIFIED REGISTERED

## 2019-08-15 PROCEDURE — 25010000002 FENTANYL CITRATE (PF) 100 MCG/2ML SOLUTION: Performed by: NURSE ANESTHETIST, CERTIFIED REGISTERED

## 2019-08-15 PROCEDURE — G0378 HOSPITAL OBSERVATION PER HR: HCPCS

## 2019-08-15 PROCEDURE — 25010000002 DEXAMETHASONE PER 1 MG: Performed by: NURSE ANESTHETIST, CERTIFIED REGISTERED

## 2019-08-15 PROCEDURE — A9541 TC99M SULFUR COLLOID: HCPCS | Performed by: SURGERY

## 2019-08-15 PROCEDURE — 88341 IMHCHEM/IMCYTCHM EA ADD ANTB: CPT | Performed by: SURGERY

## 2019-08-15 PROCEDURE — S0260 H&P FOR SURGERY: HCPCS | Performed by: SURGERY

## 2019-08-15 PROCEDURE — 78195 LYMPH SYSTEM IMAGING: CPT | Performed by: SURGERY

## 2019-08-15 PROCEDURE — 88307 TISSUE EXAM BY PATHOLOGIST: CPT | Performed by: SURGERY

## 2019-08-15 PROCEDURE — 19303 MAST SIMPLE COMPLETE: CPT | Performed by: SURGERY

## 2019-08-15 PROCEDURE — 0 TECHNETIUM FILTERED SULFUR COLLOID: Performed by: SURGERY

## 2019-08-15 PROCEDURE — C9290 INJ, BUPIVACAINE LIPOSOME: HCPCS | Performed by: SURGERY

## 2019-08-15 PROCEDURE — 25010000003 BUPIVACAINE LIPOSOME 1.3 % SUSPENSION 20 ML VIAL: Performed by: SURGERY

## 2019-08-15 DEVICE — SMOOTH, HIGH PROFILE, SUTURE TABS, INTEGRAL INJECTION DOME, 600CC
Type: IMPLANTABLE DEVICE | Site: BREAST | Status: FUNCTIONAL
Brand: ARTOURA BREAST TISSUE EXPANDER

## 2019-08-15 DEVICE — CLIP LIGAT VASC HORIZON TI SM/WD RED 24CT: Type: IMPLANTABLE DEVICE | Site: BREAST | Status: FUNCTIONAL

## 2019-08-15 DEVICE — CLIP LIGAT VASC HORIZON TI MD BLU 24CT: Type: IMPLANTABLE DEVICE | Site: BREAST | Status: FUNCTIONAL

## 2019-08-15 DEVICE — GRFT TISS ALLODERM RTM PERF CONTR 132SQ/CM THN MD: Type: IMPLANTABLE DEVICE | Site: BREAST | Status: FUNCTIONAL

## 2019-08-15 RX ORDER — LIDOCAINE HYDROCHLORIDE 10 MG/ML
0.5 INJECTION, SOLUTION EPIDURAL; INFILTRATION; INTRACAUDAL; PERINEURAL ONCE AS NEEDED
Status: DISCONTINUED | OUTPATIENT
Start: 2019-08-15 | End: 2019-08-15 | Stop reason: HOSPADM

## 2019-08-15 RX ORDER — PROMETHAZINE HYDROCHLORIDE 25 MG/ML
6.25 INJECTION, SOLUTION INTRAMUSCULAR; INTRAVENOUS
Status: DISCONTINUED | OUTPATIENT
Start: 2019-08-15 | End: 2019-08-15 | Stop reason: HOSPADM

## 2019-08-15 RX ORDER — BISACODYL 10 MG
10 SUPPOSITORY, RECTAL RECTAL DAILY PRN
Status: DISCONTINUED | OUTPATIENT
Start: 2019-08-15 | End: 2019-08-16 | Stop reason: HOSPADM

## 2019-08-15 RX ORDER — MAGNESIUM HYDROXIDE 1200 MG/15ML
LIQUID ORAL AS NEEDED
Status: DISCONTINUED | OUTPATIENT
Start: 2019-08-15 | End: 2019-08-15 | Stop reason: HOSPADM

## 2019-08-15 RX ORDER — NALOXONE HCL 0.4 MG/ML
0.2 VIAL (ML) INJECTION AS NEEDED
Status: DISCONTINUED | OUTPATIENT
Start: 2019-08-15 | End: 2019-08-15 | Stop reason: HOSPADM

## 2019-08-15 RX ORDER — ONDANSETRON 2 MG/ML
INJECTION INTRAMUSCULAR; INTRAVENOUS AS NEEDED
Status: DISCONTINUED | OUTPATIENT
Start: 2019-08-15 | End: 2019-08-15 | Stop reason: SURG

## 2019-08-15 RX ORDER — CYCLOBENZAPRINE HCL 10 MG
10 TABLET ORAL
Status: COMPLETED | OUTPATIENT
Start: 2019-08-15 | End: 2019-08-15

## 2019-08-15 RX ORDER — NALOXONE HCL 0.4 MG/ML
0.1 VIAL (ML) INJECTION
Status: DISCONTINUED | OUTPATIENT
Start: 2019-08-15 | End: 2019-08-16 | Stop reason: HOSPADM

## 2019-08-15 RX ORDER — SODIUM CHLORIDE 9 MG/ML
INJECTION, SOLUTION INTRAVENOUS AS NEEDED
Status: DISCONTINUED | OUTPATIENT
Start: 2019-08-15 | End: 2019-08-15 | Stop reason: HOSPADM

## 2019-08-15 RX ORDER — OXYCODONE AND ACETAMINOPHEN 7.5; 325 MG/1; MG/1
1 TABLET ORAL ONCE AS NEEDED
Status: DISCONTINUED | OUTPATIENT
Start: 2019-08-15 | End: 2019-08-15 | Stop reason: HOSPADM

## 2019-08-15 RX ORDER — SCOLOPAMINE TRANSDERMAL SYSTEM 1 MG/1
1 PATCH, EXTENDED RELEASE TRANSDERMAL
Status: DISCONTINUED | OUTPATIENT
Start: 2019-08-15 | End: 2019-08-15

## 2019-08-15 RX ORDER — HYDROMORPHONE HYDROCHLORIDE 1 MG/ML
0.5 INJECTION, SOLUTION INTRAMUSCULAR; INTRAVENOUS; SUBCUTANEOUS
Status: DISCONTINUED | OUTPATIENT
Start: 2019-08-15 | End: 2019-08-15 | Stop reason: HOSPADM

## 2019-08-15 RX ORDER — SODIUM CHLORIDE, SODIUM LACTATE, POTASSIUM CHLORIDE, CALCIUM CHLORIDE 600; 310; 30; 20 MG/100ML; MG/100ML; MG/100ML; MG/100ML
100 INJECTION, SOLUTION INTRAVENOUS CONTINUOUS
Status: DISCONTINUED | OUTPATIENT
Start: 2019-08-15 | End: 2019-08-16

## 2019-08-15 RX ORDER — HYDROCODONE BITARTRATE AND ACETAMINOPHEN 7.5; 325 MG/1; MG/1
1 TABLET ORAL ONCE AS NEEDED
Status: DISCONTINUED | OUTPATIENT
Start: 2019-08-15 | End: 2019-08-15 | Stop reason: HOSPADM

## 2019-08-15 RX ORDER — DEXAMETHASONE SODIUM PHOSPHATE 10 MG/ML
INJECTION INTRAMUSCULAR; INTRAVENOUS AS NEEDED
Status: DISCONTINUED | OUTPATIENT
Start: 2019-08-15 | End: 2019-08-15 | Stop reason: SURG

## 2019-08-15 RX ORDER — SODIUM CHLORIDE, SODIUM LACTATE, POTASSIUM CHLORIDE, CALCIUM CHLORIDE 600; 310; 30; 20 MG/100ML; MG/100ML; MG/100ML; MG/100ML
9 INJECTION, SOLUTION INTRAVENOUS CONTINUOUS
Status: DISCONTINUED | OUTPATIENT
Start: 2019-08-15 | End: 2019-08-15

## 2019-08-15 RX ORDER — LIDOCAINE AND PRILOCAINE 25; 25 MG/G; MG/G
CREAM TOPICAL ONCE
Status: COMPLETED | OUTPATIENT
Start: 2019-08-15 | End: 2019-08-15

## 2019-08-15 RX ORDER — ONDANSETRON 2 MG/ML
4 INJECTION INTRAMUSCULAR; INTRAVENOUS EVERY 6 HOURS PRN
Status: DISCONTINUED | OUTPATIENT
Start: 2019-08-15 | End: 2019-08-16 | Stop reason: HOSPADM

## 2019-08-15 RX ORDER — PROMETHAZINE HYDROCHLORIDE 25 MG/1
25 TABLET ORAL ONCE AS NEEDED
Status: DISCONTINUED | OUTPATIENT
Start: 2019-08-15 | End: 2019-08-15 | Stop reason: HOSPADM

## 2019-08-15 RX ORDER — METOPROLOL TARTRATE 5 MG/5ML
INJECTION INTRAVENOUS AS NEEDED
Status: DISCONTINUED | OUTPATIENT
Start: 2019-08-15 | End: 2019-08-15 | Stop reason: SURG

## 2019-08-15 RX ORDER — PROMETHAZINE HYDROCHLORIDE 25 MG/ML
12.5 INJECTION, SOLUTION INTRAMUSCULAR; INTRAVENOUS EVERY 6 HOURS PRN
Status: DISCONTINUED | OUTPATIENT
Start: 2019-08-15 | End: 2019-08-16 | Stop reason: HOSPADM

## 2019-08-15 RX ORDER — CELECOXIB 200 MG/1
400 CAPSULE ORAL
Status: COMPLETED | OUTPATIENT
Start: 2019-08-15 | End: 2019-08-15

## 2019-08-15 RX ORDER — HYDROXYZINE PAMOATE 50 MG/1
50 CAPSULE ORAL
Status: COMPLETED | OUTPATIENT
Start: 2019-08-15 | End: 2019-08-15

## 2019-08-15 RX ORDER — PROMETHAZINE HYDROCHLORIDE 25 MG/ML
12.5 INJECTION, SOLUTION INTRAMUSCULAR; INTRAVENOUS ONCE AS NEEDED
Status: DISCONTINUED | OUTPATIENT
Start: 2019-08-15 | End: 2019-08-15 | Stop reason: HOSPADM

## 2019-08-15 RX ORDER — CLINDAMYCIN PHOSPHATE 900 MG/50ML
900 INJECTION INTRAVENOUS
Status: COMPLETED | OUTPATIENT
Start: 2019-08-15 | End: 2019-08-15

## 2019-08-15 RX ORDER — PROPOFOL 10 MG/ML
VIAL (ML) INTRAVENOUS AS NEEDED
Status: DISCONTINUED | OUTPATIENT
Start: 2019-08-15 | End: 2019-08-15 | Stop reason: SURG

## 2019-08-15 RX ORDER — HYDROXYZINE PAMOATE 50 MG/1
50 CAPSULE ORAL 4 TIMES DAILY PRN
Status: DISCONTINUED | OUTPATIENT
Start: 2019-08-15 | End: 2019-08-16 | Stop reason: HOSPADM

## 2019-08-15 RX ORDER — DIPHENHYDRAMINE HYDROCHLORIDE 50 MG/ML
12.5 INJECTION INTRAMUSCULAR; INTRAVENOUS
Status: DISCONTINUED | OUTPATIENT
Start: 2019-08-15 | End: 2019-08-15 | Stop reason: HOSPADM

## 2019-08-15 RX ORDER — DIAZEPAM 5 MG/1
10 TABLET ORAL ONCE
Status: COMPLETED | OUTPATIENT
Start: 2019-08-15 | End: 2019-08-15

## 2019-08-15 RX ORDER — ACETAMINOPHEN 325 MG/1
650 TABLET ORAL ONCE AS NEEDED
Status: DISCONTINUED | OUTPATIENT
Start: 2019-08-15 | End: 2019-08-15 | Stop reason: HOSPADM

## 2019-08-15 RX ORDER — CLINDAMYCIN PHOSPHATE 600 MG/50ML
600 INJECTION INTRAVENOUS EVERY 8 HOURS
Status: DISCONTINUED | OUTPATIENT
Start: 2019-08-15 | End: 2019-08-16 | Stop reason: HOSPADM

## 2019-08-15 RX ORDER — CELECOXIB 200 MG/1
400 CAPSULE ORAL DAILY
Status: DISCONTINUED | OUTPATIENT
Start: 2019-08-16 | End: 2019-08-16 | Stop reason: HOSPADM

## 2019-08-15 RX ORDER — EPHEDRINE SULFATE 50 MG/ML
5 INJECTION, SOLUTION INTRAVENOUS ONCE AS NEEDED
Status: DISCONTINUED | OUTPATIENT
Start: 2019-08-15 | End: 2019-08-15 | Stop reason: HOSPADM

## 2019-08-15 RX ORDER — CYCLOBENZAPRINE HCL 10 MG
10 TABLET ORAL 3 TIMES DAILY PRN
Status: DISCONTINUED | OUTPATIENT
Start: 2019-08-15 | End: 2019-08-16 | Stop reason: HOSPADM

## 2019-08-15 RX ORDER — HYDROMORPHONE HCL 110MG/55ML
PATIENT CONTROLLED ANALGESIA SYRINGE INTRAVENOUS AS NEEDED
Status: DISCONTINUED | OUTPATIENT
Start: 2019-08-15 | End: 2019-08-15 | Stop reason: SURG

## 2019-08-15 RX ORDER — CEFAZOLIN SODIUM 2 G/100ML
2 INJECTION, SOLUTION INTRAVENOUS ONCE
Status: COMPLETED | OUTPATIENT
Start: 2019-08-15 | End: 2019-08-15

## 2019-08-15 RX ORDER — FENTANYL CITRATE 50 UG/ML
INJECTION, SOLUTION INTRAMUSCULAR; INTRAVENOUS AS NEEDED
Status: DISCONTINUED | OUTPATIENT
Start: 2019-08-15 | End: 2019-08-15 | Stop reason: SURG

## 2019-08-15 RX ORDER — ESMOLOL HYDROCHLORIDE 10 MG/ML
INJECTION INTRAVENOUS AS NEEDED
Status: DISCONTINUED | OUTPATIENT
Start: 2019-08-15 | End: 2019-08-15 | Stop reason: SURG

## 2019-08-15 RX ORDER — LIDOCAINE HYDROCHLORIDE 20 MG/ML
INJECTION, SOLUTION INFILTRATION; PERINEURAL AS NEEDED
Status: DISCONTINUED | OUTPATIENT
Start: 2019-08-15 | End: 2019-08-15 | Stop reason: SURG

## 2019-08-15 RX ORDER — OXYCODONE AND ACETAMINOPHEN 10; 325 MG/1; MG/1
1 TABLET ORAL EVERY 4 HOURS PRN
Status: DISCONTINUED | OUTPATIENT
Start: 2019-08-15 | End: 2019-08-16 | Stop reason: HOSPADM

## 2019-08-15 RX ORDER — OXYCODONE HYDROCHLORIDE AND ACETAMINOPHEN 5; 325 MG/1; MG/1
1 TABLET ORAL EVERY 4 HOURS PRN
Status: DISCONTINUED | OUTPATIENT
Start: 2019-08-15 | End: 2019-08-16 | Stop reason: HOSPADM

## 2019-08-15 RX ORDER — SODIUM CHLORIDE 0.9 % (FLUSH) 0.9 %
1-10 SYRINGE (ML) INJECTION AS NEEDED
Status: DISCONTINUED | OUTPATIENT
Start: 2019-08-15 | End: 2019-08-15 | Stop reason: HOSPADM

## 2019-08-15 RX ORDER — FENTANYL CITRATE 50 UG/ML
50 INJECTION, SOLUTION INTRAMUSCULAR; INTRAVENOUS
Status: DISCONTINUED | OUTPATIENT
Start: 2019-08-15 | End: 2019-08-15 | Stop reason: HOSPADM

## 2019-08-15 RX ORDER — ONDANSETRON 2 MG/ML
4 INJECTION INTRAMUSCULAR; INTRAVENOUS ONCE AS NEEDED
Status: DISCONTINUED | OUTPATIENT
Start: 2019-08-15 | End: 2019-08-15 | Stop reason: HOSPADM

## 2019-08-15 RX ORDER — DEXAMETHASONE SODIUM PHOSPHATE 4 MG/ML
8 INJECTION, SOLUTION INTRA-ARTICULAR; INTRALESIONAL; INTRAMUSCULAR; INTRAVENOUS; SOFT TISSUE
Status: COMPLETED | OUTPATIENT
Start: 2019-08-15 | End: 2019-08-15

## 2019-08-15 RX ORDER — HYDROMORPHONE HYDROCHLORIDE 1 MG/ML
0.25 INJECTION, SOLUTION INTRAMUSCULAR; INTRAVENOUS; SUBCUTANEOUS
Status: DISCONTINUED | OUTPATIENT
Start: 2019-08-15 | End: 2019-08-16 | Stop reason: HOSPADM

## 2019-08-15 RX ORDER — LABETALOL HYDROCHLORIDE 5 MG/ML
5 INJECTION, SOLUTION INTRAVENOUS
Status: DISCONTINUED | OUTPATIENT
Start: 2019-08-15 | End: 2019-08-15 | Stop reason: HOSPADM

## 2019-08-15 RX ORDER — FLUMAZENIL 0.1 MG/ML
0.2 INJECTION INTRAVENOUS AS NEEDED
Status: DISCONTINUED | OUTPATIENT
Start: 2019-08-15 | End: 2019-08-15 | Stop reason: HOSPADM

## 2019-08-15 RX ORDER — SACCHAROMYCES BOULARDII 250 MG
500 CAPSULE ORAL 2 TIMES DAILY
Status: DISCONTINUED | OUTPATIENT
Start: 2019-08-15 | End: 2019-08-16 | Stop reason: HOSPADM

## 2019-08-15 RX ORDER — ROCURONIUM BROMIDE 10 MG/ML
INJECTION, SOLUTION INTRAVENOUS AS NEEDED
Status: DISCONTINUED | OUTPATIENT
Start: 2019-08-15 | End: 2019-08-15 | Stop reason: SURG

## 2019-08-15 RX ORDER — DIPHENHYDRAMINE HCL 25 MG
25 CAPSULE ORAL
Status: DISCONTINUED | OUTPATIENT
Start: 2019-08-15 | End: 2019-08-15 | Stop reason: HOSPADM

## 2019-08-15 RX ORDER — CYCLOBENZAPRINE HCL 10 MG
5 TABLET ORAL ONCE
Status: COMPLETED | OUTPATIENT
Start: 2019-08-15 | End: 2019-08-15

## 2019-08-15 RX ORDER — ACETAMINOPHEN 325 MG/1
975 TABLET ORAL
Status: COMPLETED | OUTPATIENT
Start: 2019-08-15 | End: 2019-08-15

## 2019-08-15 RX ORDER — ROPIVACAINE HYDROCHLORIDE 5 MG/ML
INJECTION, SOLUTION EPIDURAL; INFILTRATION; PERINEURAL AS NEEDED
Status: DISCONTINUED | OUTPATIENT
Start: 2019-08-15 | End: 2019-08-15 | Stop reason: HOSPADM

## 2019-08-15 RX ORDER — HYDRALAZINE HYDROCHLORIDE 20 MG/ML
5 INJECTION INTRAMUSCULAR; INTRAVENOUS
Status: DISCONTINUED | OUTPATIENT
Start: 2019-08-15 | End: 2019-08-15 | Stop reason: HOSPADM

## 2019-08-15 RX ORDER — FAMOTIDINE 10 MG/ML
20 INJECTION, SOLUTION INTRAVENOUS ONCE
Status: COMPLETED | OUTPATIENT
Start: 2019-08-15 | End: 2019-08-15

## 2019-08-15 RX ORDER — CELECOXIB 200 MG/1
200 CAPSULE ORAL ONCE
Status: COMPLETED | OUTPATIENT
Start: 2019-08-15 | End: 2019-08-15

## 2019-08-15 RX ORDER — PROMETHAZINE HYDROCHLORIDE 25 MG/1
25 SUPPOSITORY RECTAL ONCE AS NEEDED
Status: DISCONTINUED | OUTPATIENT
Start: 2019-08-15 | End: 2019-08-15 | Stop reason: HOSPADM

## 2019-08-15 RX ADMIN — Medication 500 MG: at 20:15

## 2019-08-15 RX ADMIN — HYDROXYZINE PAMOATE 50 MG: 50 CAPSULE ORAL at 20:16

## 2019-08-15 RX ADMIN — DEXAMETHASONE SODIUM PHOSPHATE 8 MG: 4 INJECTION, SOLUTION INTRAMUSCULAR; INTRAVENOUS at 08:23

## 2019-08-15 RX ADMIN — CYCLOBENZAPRINE 10 MG: 10 TABLET, FILM COATED ORAL at 08:23

## 2019-08-15 RX ADMIN — METOPROLOL TARTRATE 1 MG: 1 INJECTION, SOLUTION INTRAVENOUS at 12:29

## 2019-08-15 RX ADMIN — DEXAMETHASONE SODIUM PHOSPHATE 8 MG: 10 INJECTION INTRAMUSCULAR; INTRAVENOUS at 11:49

## 2019-08-15 RX ADMIN — CELECOXIB 200 MG: 200 CAPSULE ORAL at 15:35

## 2019-08-15 RX ADMIN — DIAZEPAM 10 MG: 5 TABLET ORAL at 08:32

## 2019-08-15 RX ADMIN — ROCURONIUM BROMIDE 50 MG: 10 INJECTION INTRAVENOUS at 11:24

## 2019-08-15 RX ADMIN — HYDROXYZINE PAMOATE 50 MG: 50 CAPSULE ORAL at 08:23

## 2019-08-15 RX ADMIN — CYCLOBENZAPRINE 5 MG: 10 TABLET, FILM COATED ORAL at 15:38

## 2019-08-15 RX ADMIN — CELECOXIB 400 MG: 200 CAPSULE ORAL at 08:23

## 2019-08-15 RX ADMIN — FENTANYL CITRATE 50 MCG: 50 INJECTION, SOLUTION INTRAMUSCULAR; INTRAVENOUS at 11:44

## 2019-08-15 RX ADMIN — PROPOFOL 150 MG: 10 INJECTION, EMULSION INTRAVENOUS at 11:24

## 2019-08-15 RX ADMIN — SODIUM CHLORIDE, POTASSIUM CHLORIDE, SODIUM LACTATE AND CALCIUM CHLORIDE 100 ML/HR: 600; 310; 30; 20 INJECTION, SOLUTION INTRAVENOUS at 19:51

## 2019-08-15 RX ADMIN — ROCURONIUM BROMIDE 20 MG: 10 INJECTION INTRAVENOUS at 12:55

## 2019-08-15 RX ADMIN — SCOPALAMINE 1 PATCH: 1 PATCH, EXTENDED RELEASE TRANSDERMAL at 08:23

## 2019-08-15 RX ADMIN — ONDANSETRON 4 MG: 2 INJECTION INTRAMUSCULAR; INTRAVENOUS at 11:49

## 2019-08-15 RX ADMIN — SODIUM CHLORIDE, POTASSIUM CHLORIDE, SODIUM LACTATE AND CALCIUM CHLORIDE: 600; 310; 30; 20 INJECTION, SOLUTION INTRAVENOUS at 14:08

## 2019-08-15 RX ADMIN — OXYCODONE HYDROCHLORIDE AND ACETAMINOPHEN 1 TABLET: 10; 325 TABLET ORAL at 17:42

## 2019-08-15 RX ADMIN — FENTANYL CITRATE 50 MCG: 50 INJECTION, SOLUTION INTRAMUSCULAR; INTRAVENOUS at 11:49

## 2019-08-15 RX ADMIN — ACETAMINOPHEN 975 MG: 325 TABLET, FILM COATED ORAL at 08:23

## 2019-08-15 RX ADMIN — SUGAMMADEX 100 MG: 100 INJECTION, SOLUTION INTRAVENOUS at 14:05

## 2019-08-15 RX ADMIN — FAMOTIDINE 20 MG: 10 INJECTION INTRAVENOUS at 09:50

## 2019-08-15 RX ADMIN — ESMOLOL HYDROCHLORIDE 10 MG: 10 INJECTION, SOLUTION INTRAVENOUS at 11:54

## 2019-08-15 RX ADMIN — CLINDAMYCIN PHOSPHATE 900 MG: 900 INJECTION INTRAVENOUS at 11:30

## 2019-08-15 RX ADMIN — SODIUM CHLORIDE, POTASSIUM CHLORIDE, SODIUM LACTATE AND CALCIUM CHLORIDE 9 ML/HR: 600; 310; 30; 20 INJECTION, SOLUTION INTRAVENOUS at 09:50

## 2019-08-15 RX ADMIN — FENTANYL CITRATE 50 MCG: 50 INJECTION, SOLUTION INTRAMUSCULAR; INTRAVENOUS at 15:54

## 2019-08-15 RX ADMIN — TECHNETIUM TC 99M SULFUR COLLOID 1 DOSE: KIT at 09:33

## 2019-08-15 RX ADMIN — CEFAZOLIN SODIUM 2000 MG: 2 INJECTION, SOLUTION INTRAVENOUS at 11:30

## 2019-08-15 RX ADMIN — HYDROMORPHONE HYDROCHLORIDE 0.5 MG: 2 INJECTION INTRAMUSCULAR; INTRAVENOUS; SUBCUTANEOUS at 12:16

## 2019-08-15 RX ADMIN — FENTANYL CITRATE 50 MCG: 50 INJECTION, SOLUTION INTRAMUSCULAR; INTRAVENOUS at 15:35

## 2019-08-15 RX ADMIN — LIDOCAINE AND PRILOCAINE: 25; 25 CREAM TOPICAL at 08:03

## 2019-08-15 RX ADMIN — FENTANYL CITRATE 50 MCG: 50 INJECTION, SOLUTION INTRAMUSCULAR; INTRAVENOUS at 11:29

## 2019-08-15 RX ADMIN — LIDOCAINE HYDROCHLORIDE 100 MG: 20 INJECTION, SOLUTION INFILTRATION; PERINEURAL at 11:24

## 2019-08-15 RX ADMIN — FENTANYL CITRATE 100 MCG: 50 INJECTION, SOLUTION INTRAMUSCULAR; INTRAVENOUS at 11:24

## 2019-08-15 RX ADMIN — CLINDAMYCIN PHOSPHATE 600 MG: 600 INJECTION, SOLUTION INTRAVENOUS at 19:52

## 2019-08-15 RX ADMIN — HYDROMORPHONE HYDROCHLORIDE 0.5 MG: 2 INJECTION INTRAMUSCULAR; INTRAVENOUS; SUBCUTANEOUS at 12:02

## 2019-08-15 RX ADMIN — NITROGLYCERIN 1 INCH: 20 OINTMENT TOPICAL at 20:15

## 2019-08-15 RX ADMIN — METOPROLOL TARTRATE 1 MG: 1 INJECTION, SOLUTION INTRAVENOUS at 12:17

## 2019-08-15 NOTE — ANESTHESIA PREPROCEDURE EVALUATION
Anesthesia Evaluation     Patient summary reviewed and Nursing notes reviewed   no history of anesthetic complications:  NPO Solid Status: > 8 hours  NPO Liquid Status: > 2 hours           Airway   Mallampati: I  TM distance: >3 FB  Neck ROM: full  No difficulty expected  Dental - normal exam     Pulmonary     breath sounds clear to auscultation  Cardiovascular   Exercise tolerance: excellent (>7 METS)    Rhythm: regular  Rate: normal        Neuro/Psych  GI/Hepatic/Renal/Endo      Musculoskeletal     Abdominal    Substance History      OB/GYN          Other      history of cancer                    Anesthesia Plan    ASA 2     general     intravenous induction   Anesthetic plan, all risks, benefits, and alternatives have been provided, discussed and informed consent has been obtained with: patient.    Plan discussed with CRNA.

## 2019-08-15 NOTE — OP NOTE
Preop diagnosis: Left breast cancer  Postoperative diagnosis: Same  Procedure: #1 left breast reconstruction with placement of subpectoral tissue expander [place] Zuhair smooth 600 cc device (filled with 75 cc of sterile normal saline) #2 placement of AlloDerm contour medium thin perforated because of inadequate pectoral muscle coverage over the expander  Surgeon: Tre Levine MD  Assistant: Kya GONZALEZ CSA  Anesthesia: General endotracheal  Estimated blood loss for reconstruction 10 cc  Drains x3  Complications none apparent  Indications for procedure this nice patient's been found to have cancer of the left breast and she has some atypia and elsewhere in the breast and right mastectomy has been recommended we discussed her options for reconstruction she is a bit too thin for TRAM flap reconstruction and we have elected to proceed with a tissue expander reconstruction I discussed with her that I suspect given the size and width of her breast her pectoral muscles going to be inadequate for coverage over this expander and we wrote will require the use of some AlloDerm which is presently considered off label use but we have used it for many years on label and its designation is recently changed patient understands and agrees to proceed she had no further questions today prior to surgery she is also reviewed the informed consent from the [employer] initialed and signed that and I have discussed this with her and she had no further questions regarding that she understands we plan to use a smooth saline expander and ultimately a smooth saline implant and we discussed the implications of texturing and smooth in regards to ALCL at her initial visit.  She is ready to proceed today and she is marked prior to surgery  Procedure: Dr. Barker commenced with the procedure and following her mastectomy and sentinel node biopsies we entered the operating room and entered the field we painted the  skin with additional Betadine irrigated with triple antibiotic containing saline and dilute Betadine we inspected the muscle it appeared inadequate to cover a 600 cc smooth Zuhair expander particularly laterally and inferior laterally and we opened a medium piece of AlloDerm thin perforated contour AlloDerm it was bathed in 2 separate baths and then our antibiotic containing saline using a lighted retractor and a Bovie we carefully developed a subpectoral pocket releasing the pectoral muscle inferiorly and inferior laterally leaving it attached to its fascial connections inferior medially but detaching the muscle after developing an   adequate pocket we injected the medial lateral superior and inferior tissues with Exparel L for postoperative analgesia as well as ropivacaine we blocked between her pectoral muscles we block subcutaneous tissues rib blocks serratus musculature and blocks several locations toward the axilla and then peripherally around into the soft tissues as well we then carefully took her AlloDerm and squeegee to it out and dipped it again in our triple antibiotic containing saline and sutured this along the inferior lateral aspects of the reconstruction pocket where her pectoral muscle was then adequate we also closed the lateral dissection of the mastectomy down as we sewed this in closing the dead space laterally we then inspected hemostasis was excellent we placed 15 [place] Aditya drain into this deep pocket the separate incision incision and secured with a nylon suture using a fresh pair powder free gloves we opened a [place] Zuhair smooth expander all the air was evacuated from the device is free from any gross defects we bathed that in our triple antibiotic containing saline and then in Betadine and carefully passed it into the pocket the drain was in good position we secured the lateral tabs with 2-0 PDS and using a closed system technique we filled it with 75 cc of sterile normal saline this  gave no tension to the overlying muscle or skin closure we then carefully tailored the AlloDerm and sutured it to the lateral edge of the pectoral muscle using running 2-0 PDS we tacked the inferior lateral mastectomy flap to the AlloDerm in several locations using 2-0 Vicryl we placed an additional drain along the inferior flap of 15 [place] Aditya and then a 10 [place] axion was placed beneath superior flap these were all were secured with nylon sutures we irrigated with additional antibiotic containing saline and obtained excellent hemostasis the skin appeared to be healthy and viable hemostasis was excellent we then reapproximated the skin edges with multiple 4-0 Vicryls and 5-0 PDS to complete the mastectomy skin edge closure Dermabond was placed over the incision as well we allowed this to dry we then applied nitroglycerin paste to the mastectomy skin edges as a preventative measure to help with perfusion of the mastectomy skin edges ABD pads Telfa and 6 inch Ace wrap for gentle compressive dressing were applied we infused her drains with some triple antibiotic containing saline and ropivacaine as well and suctioned this way at the conclusion of the case.  Sponge counts were correct x2 hemostasis was excellent the skin did come together without any tension whatsoever and she went to the recovery area in satisfactory condition.

## 2019-08-15 NOTE — ANESTHESIA PROCEDURE NOTES
Airway  Urgency: elective    Date/Time: 8/15/2019 11:59 AM  End Time:8/15/2019 11:59 AM  Airway not difficult    General Information and Staff    Patient location during procedure: OR  Anesthesiologist: Elfego Hameed MD  CRNA: Jessica De La Cruz CRNA    Indications and Patient Condition  Indications for airway management: airway protection    Preoxygenated: yes  MILS maintained throughout  Mask difficulty assessment: 1 - vent by mask    Final Airway Details  Final airway type: endotracheal airway      Successful airway: ETT  Cuffed: yes   Successful intubation technique: direct laryngoscopy  Endotracheal tube insertion site: oral  Blade: Rod  Blade size: 2  ETT size (mm): 7.0  Cormack-Lehane Classification: grade I - full view of glottis  Placement verified by: chest auscultation and capnometry   Measured from: lips  Number of attempts at approach: 1    Additional Comments  Atraumatic, Secured after verification of placement

## 2019-08-15 NOTE — PLAN OF CARE
Problem: Patient Care Overview  Goal: Plan of Care Review  Outcome: Ongoing (interventions implemented as appropriate)   08/15/19 3469   Coping/Psychosocial   Plan of Care Reviewed With patient;family   Plan of Care Review   Progress improving   OTHER   Outcome Summary Left chest dressing CD&I. Left arm in sling. LYLY's with bloody drainage, #1 draining much more than #2. Sorensen catheter in place, to be removed in AM. C/o pain 4/10, given Percocet 10. Tolerating clear liquids and crackers, requesting regular dinner tray. IVF infusing. VSS on room air.      Goal: Individualization and Mutuality  Outcome: Ongoing (interventions implemented as appropriate)    Goal: Discharge Needs Assessment  Outcome: Ongoing (interventions implemented as appropriate)    Goal: Interprofessional Rounds/Family Conf  Outcome: Ongoing (interventions implemented as appropriate)      Problem: Breast Surgery/Reconstruction (Adult)  Goal: Signs and Symptoms of Listed Potential Problems Will be Absent, Minimized or Managed (Breast Surgery/Reconstruction)  Outcome: Ongoing (interventions implemented as appropriate)    Goal: Anesthesia/Sedation Recovery  Outcome: Outcome(s) achieved Date Met: 08/15/19

## 2019-08-15 NOTE — ANESTHESIA POSTPROCEDURE EVALUATION
Patient: Екатерина Strauss    Procedure Summary     Date:  08/15/19 Room / Location:  [facility] OR  / [facility] MAIN OR    Anesthesia Start:  1112 Anesthesia Stop:  1428    Procedures:       LEFt total mastectomy, LEFT axilla sentinel node biopsy,  immediate recosntruction (Left Breast)      LEFT PLACEMENT TISSUE EXPANDER WITH ALLORDERM (Left Breast) Diagnosis:       Malignant neoplasm of upper-outer quadrant of left breast in female, estrogen receptor positive (CMS/HCC)      (Malignant neoplasm of upper-outer quadrant of left breast in female, estrogen receptor positive (CMS/HCC) [C50.412, Z17.0])    Surgeon:  Sarah Barker MD; PATRICIO Levine MD Provider:  Elfego Hameed MD    Anesthesia Type:  general ASA Status:  2          Anesthesia Type: general  Last vitals  BP   102/68 (08/15/19 1635)   Temp   37 °C (98.6 °F) (08/15/19 1635)   Pulse   74 (08/15/19 1635)   Resp   16 (08/15/19 1635)     SpO2   99 % (08/15/19 1635)     Post Anesthesia Care and Evaluation    Patient location during evaluation: PACU  Anesthetic complications: No anesthetic complications

## 2019-08-15 NOTE — OP NOTE
Operative note    Preoperative Diagnosis: Breast cancer, LEFT     Postoperative Diagnosis: Breast cancer, LEFT    Procedure Performed: left mastectomy and left axillary sentinel node biopsy with lymphoscintographic guidance. Reconstruction immediately following, tissue expanders with alloderm.    Dictating physician and surgeon: Sarah Barker MD    Plastic Surgeon: Ravindra Levine MD    First asst: Mikala Valle    EBL:10cc    FINDINGS AND DESCRIPTION OF PROCEDURE:     The patient was brought to the operating room and placed on the table in the supine position. After adequate general ETT anesthesia was obtained, we sterilly prepped and draped the breast and axilla. We did a time out to identify correct patient and correct operative site.  She did receive IV antibiotic within an hour of and prior to incision.     For  the mastectomy, I performed the following procedure:  I tumesced the mastectomy flaps using 240 cc of 1:500,000 epinephrine in normal saline.  I tumesced superiorly to the clavicle, inferiorly to the inframammary fold, medially to the sternum and laterally to the anterior axillary line.  I then incised a skin sparing ellipse of skin surrounding the nipple areolar complex using a 10 blade. I then sharply dissected using a 10 blade and a long curved may scissor superiorly to the clavicle, inferiorly to the inframammary fold, medially to the sternum and laterally to the anterior axillary line. I then scored the perimeter of the specimen, the pectoral fascia, circumferentially. I then lifted the pectoral fascia off of the pectoralis major, as the posterior margin of the specimen.    I then labelled  the specimen stitch marks 12:00 and passed it from the field.    Next, I turned my attention to the axilla. I used the neoprobe at the start of the case to ensure that the radiotracer had migrated to the axilla, which it had.  I used bovie electrocautery for dissection and the gamma probe for guidance, to remove  the lymph nodes demonstrating radiopharmaceutical uptake.     There were 2 sentinel nodes removed. All were grossly normal. These were sent for frozen section pathology and were found to be benign. Coutns 136, 108    I then irrigated, assured hemostasis and plastics then came in to do the reconstructive portion of the case.    She tolerated the procedure well, there were no immediate complications, and all counts were correct at the end of the case.

## 2019-08-15 NOTE — H&P
There are no changes in the history or physical exam, aside from any that are listed as an addendum at the bottom of this document.   Today, patient will go for the surgery listed in the plan below.        Chief Complaint: Екатерина Strauss is a 49 y.o. female who was seen in consultation at the request of  Jim Rodríguez II, MD   for newly diagnosed breast cancer    History of Present Illness:  Patient presents with newly diagnosed breast cancer, LEFT breast She noted no new masses, skin changes, nipple discharge, nipple changes prior to her most recent imaging.  Her most recent imaging includes the followin2019  [facility]   Mammo  Екатерина Strauss  Seen for yearly screening.  BILATERAL SCREENING MAMMOGRAM WITH TOMOSYNTHESIS  Heterogeneously dense.  Finding 1: Focal asymmetry 12 millimeters with multiple associated calcifications grouped distribution and architectural distortion middle one-third 12:30 left breast located 6 centimeters from the nipple. New since prior.  Finding 2: Stable area of asymmetric breast tissue posterior one-third lateral region of the right breast. No significant change.  IMPRESSION:  Finding 1: Left breast.  Finding 2: Benign-negative.  BIRADS Category 0    2019  [facility]   Radiology  Екатерина Strauss  LEFT DIAGNOSTIC MAMMOGRAM WITH TOMOSYNTHESIS  Heterogeneously dense.  Finding 1: Focal asymmetry in the left breast, 12:30. New focal asymmetry measuring 12 millimeters with multiple associated indistinct calcifications with grouped distribution and architectural distortion in the middle one-third 12:30 left breast located 4 centimeters from the nipple.  Finding 2: Several fine granular and indistinct calcifications measuring 7 millimeters middle one-third of the left breast at 12-1:00, central located 6 centimeters from the nipple. 4 cm inferior to the dominant finding described above.  Finding 3: Several groups of indistinct  calcifications seen in the middle one-third of the left breast at 12-1:00, central. Flank the findings described above, spanning 6 to 7 cm in CC dimension.  LEFT BREAST ULTRASOUND  Finding 1: Irregular solid mass with indistinct margins measuring 11 x 8 x 10 mm.  Finding 4: Oval very small complicated cyst 4 x 2 x 4 mm left breast at 4:00 located 3 centimeters from the nipple.  Finding 5: Oval very small simple cyst, 4 millimeters left breast at 11:00.  Finding 6: Oval complicated cyst 5 x 3 x 4 mm left breast at 12:00 located 2 centimeters from the nipple.  IMPRESSION:  Finding 1: Mass left breast is suspicious.  Finding 2: Calcifications left breast at 12-1:00.  Finding 3: Calcifications middle one-third of the left breast at 12-1:00, suspicious.  Finding 4: Complicated cyst breast at 4:00 located 3 centimeters from the nipple is suspicious.  Finding 5: Left breast at 11:00 benign-negative.  Finding 6: Complicated cyst in the left breast at 12:00 located 2 centimeters from the nipple is suspicious.  BIRADS Category 4C    07/08/2019  [facility]    Radiology  Екатерина Strauss  12:00 left breast middle third on 6 cm from the nipple metallic clip centrally located within an irregular mass 1.5 cm anterior to posterior, 0.8 cm superior to inferior 1.2 cm medial lateral. The metallic clip represents the most posterior of the 2 barrel-shaped metallic clips. The more anterior barrel-shaped clips is located within a post biopsy hematoma cavity.  3:00 middle third 5 cm from the nipple is a metallic clip within postbiopsy hematoma cavity. This represents the top hat shaped metallic clip. No suspicious residual enhancement. The hematoma measures 4.0 cm.  No other areas of abnormal enhancement left breast. No left axillary internal mammary chain adenopathy.  Scattered enhancement right breast. 0.8 cm oval circumscribed weakly enhancing mass consistent with a fibroadenoma retroareolar region of the right breast at the  6:00.      She had a biopsy on the following day that showed:     06/25/2019  [facility]   Radiology  Екатерина Strauss  LEFT UTLRASOUND GUIDED BIOPSY  12:00 position left breast.  12-gauge. 20 cores. Minicork shaped s-anjum tissue marker was placed.  ADDENDUM:  Pathology is concordant.  That this mass was marked with two “barrel” shaped clip.  The mammogram demonstrated several similar groups of calcifications covering a length of approximately 8 cm. Recommend a breast MRI.  In addition the patient had a small nodule that appeared to possibly be a complicated cyst that was attempted to be aspirated but it would not aspirate. It had overall rather benign features. If it was thought to be helpful by biopsying any of the remaining groups of microcalcifcations or this small solid nodule if it would  we would be very happy to do so.  Please note that initially there was a small complex appearing nodule at the 12:00 position for which aspiration was recommended but this appeared to have been sampled with the stereotactic biopsy as it greatly diminished in size. The small nodule at the 4:00 position was solid and fairly benign in appearance and it was elected to do a six month follow up and again if it would  I would be happy to do a core biopsy of this.    06/25/2019  [facility]   Biopsy   Екатерина Strauss  Left breast at the 12:00. 12 core needle biopsy specimens were obtained using a 9-gauge vacuum assisted device. Top hat shaped s-anjum Eviva tissue marker was deployed.  Pathology calcifications at the 12-1:00 represents (ADH). This is concordant.  Ultrasound guided core biopsy of a mass at the 12:30. These areas are approximately 3 cm apart in the ML projection and 2 cm apart in the CC projection. The patient also had a complicated cyst at the 12:00 position was recommended for aspiration but prior to the attempted aspiration the cyst was no longer present and it  was thought to have been removed with the stereotactic biopsy. There was a small complicated appearing cyst at the 4:00 position that was attempted to be aspirated but it now seemed to be solid and was left for a follow up ultrasound in six months. I would recommend a breast MRI.    06/25/2019  [facility]     Pathology  Екатерина Strauss  DIAGNOSIS  1. Left Breast, 12-1:00, Core Biopsies:  - Single Focus of Atypical Ductal Hyperplasia (ADH), Solid and Cribriform Type, 0.1 cm in Dimension.  - Florid Hyperplasia of Usual Type, Columnar Cell Hyperplasia, Apocrine Change, and Fibrocystic Changes are Also Present.  - Microcalcifications Are Present in Fibroproliferative Changes and in ADH.  2. Left Breast, 12:30, Core Biopsies:  - Invasive Mammary Carcinoma, No Special Type (Ductal), Low Grade, Measuring at Least 0.9 cm in Dimension with Limited Associated Low Grade Cribriform Ductal Carcinoma in Situ (DCIS).  - Microcalcifications Are Present Within the Invasive Carcinoma and in Adjacent Benign Breast Parenchyma with Fibrocystic Changes.  Low grade morphology (tubules = 1, nuclear atypia = 2, and mitoses = 1).    06/25/2019  [facility]     Marker Analysis Екатерина Strauss   ER - 90.78%  ID - 94.21%  HER2 - 1+  Ki-67 - 5.7%    She has not had a breast biopsy in the past.  She has her ovaries, had her uterus removed for bleeding, and takes nor hormones.  Her family history includes the following:   She has 1 daughter, 1 son, no sisters, one paternal aunt, 3 maternal aunts.  She has a paternal great aunt who had breast cancer at uncertain age.  She is here for evaluation.    Review of Systems:  Review of Systems   All other systems reviewed and are negative.       Past Medical and Surgical History:  Breast Biopsy History:  Patient had not had a breast biopsy prior to her cancer diagnosis.  Breast Cancer HIstory:  Patient does not have a past medical history of breast cancer.  Breast Operations, and year:  NONE   Obstetric/Gynecologic  "History:  Age menstrual periods began: 10  Patient is postmenopausal due to removal of her uterus in the following year: 2008   Number of pregnancies: 4  Number of live births: 2  Number of abortions or miscarriages: 2  Age of delivery of first child: 23  Patient breast fed, for the following lenth of time: 3-4 MONTHS PER CHILD.   Length of time taking birth control pills: 20 YRS.   Patient has never taken hormone replacement  UTERUS REMOVED 2008.     Past Surgical History:   Procedure Laterality Date   • BREAST BIOPSY     • DILATATION AND CURETTAGE     • FOOT SURGERY     • HYSTERECTOMY     • TIBIA FRACTURE SURGERY     • WISDOM TOOTH EXTRACTION         Past Medical History:   Diagnosis Date   • Fibrocystic breast    • Seasonal allergies        Prior Hospitalizations, other than for surgery or childbirth, and year:  NONE     Social History     Socioeconomic History   • Marital status: Single     Spouse name: Not on file   • Number of children: Not on file   • Years of education: Not on file   • Highest education level: Not on file   Tobacco Use   • Smoking status: Never Smoker   • Smokeless tobacco: Never Used   Substance and Sexual Activity   • Alcohol use: Yes     Comment: 3 PER MONTH-SOCIALLY   • Drug use: No     Patient is .  Patient is employed full time with the following occupation: CUSTOMER SERVICE  Patient drinks 2 servings of caffeine per day.    Family History:  Family History   Problem Relation Age of Onset   • Diabetes Mother    • Cancer Maternal Aunt         OF UNKNOWN ORGIN   • Diabetes Maternal Aunt    • Breast cancer Paternal Aunt         PATERNAL GREAT AUNT-UNKNOLWN AGE    • Lung cancer Maternal Grandmother    • Brain cancer Maternal Grandmother        Vital Signs:  /66 (BP Location: Right arm, Patient Position: Sitting, Cuff Size: Adult)   Pulse 82   Ht 174 cm (68.5\")   Wt 76.2 kg (168 lb)   LMP  (LMP Unknown)   SpO2 99%   Breastfeeding? No   BMI 25.17 kg/m²  " "    Medications:    Current Outpatient Medications:   •  loratadine-pseudoephedrine (CLARITIN-D 24 HOUR)  MG per 24 hr tablet, Take 1 tablet by mouth Daily., Disp: , Rfl:   •  Multiple Vitamin (MULTIVITAMIN) tablet, Take 1 tablet by mouth Daily., Disp: , Rfl:      Allergies:  Allergies   Allergen Reactions   • Codeine Rash     As a child, does not recall the severity of the reaction       Physical Examination:  /66 (BP Location: Right arm, Patient Position: Sitting, Cuff Size: Adult)   Pulse 82   Ht 174 cm (68.5\")   Wt 76.2 kg (168 lb)   LMP  (LMP Unknown)   SpO2 99%   Breastfeeding? No   BMI 25.17 kg/m²   General Appearance:  Patient is in no distress.  She is well kept and has an thin build.   Psychiatric:  Patient with appropriate mood and affect. Alert and oriented to self, time, and place.    Breast, RIGHT:  medium sized, 34D, symmetric with the contralateral side.  Breast skin is without erythema, edema, rashes.  There are no visible abnormalities upon inspection during the arm-raising maneuver or with hands on hips in the sitting position. There is no nipple retraction, discharge or nipple/areolar skin changes.There are no masses palpable in the sitting or supine positions.    Breast, LEFT:  medium sized, 34D,  symmetric with the contralateral side.  Breast skin is without erythema, edema, rashes.  There are no visible abnormalities upon inspection during the arm-raising maneuver or with hands on hips in the sitting position. There is no nipple retraction, discharge or nipple/areolar skin changes.there are palpable hematomas left breast 2:00 inner ring.  There are 2 well-healed mammotomy's from her recent percutaneous biopsy site.  The area of biopsy and hematoma measures 6 cm at the 12-3 o'clock inner to middle ring location.  There are no other masses palpable in the sitting or supine positions.    Lymphatic:  There is no axillary, cervical, infraclavicular, or supraclavicular adenopathy " bilaterally.  Eyes:  Pupils are round and reactive to light.  Cardiovascular:  Heart rate and rhythm are regular.  Respiratory:  Lungs are clear bilaterally with no crackles or wheezes in any lung field.  Gastrointestinal:  Abdomen is soft, nondistended, and nontender. There are no scars from previous surgery.    Musculoskeletal:  Good strength in all 4 extremities.   There is good range of motion in both shoulders.    Skin:  No new skin lesions or rashes on the skin excluding the breast (see breast exam above).        [id number]  [facility]   Mammo  Екатеринаnile Adamsrer  BILATERAL MAMMOGRAPHY  Heterogeneously dense.  Finding 1: Stable area of asymmetric breast tissue with associated punctate calcifications regional distribution superior region of the left breast. No significant change.  Finding 2: There stable areas of asymmetric breast tissue seen in both breasts.  BIRADS Category 2, Benign.    2019  [facility]   Mammo  Екатерина Strauss  Seen for yearly screening.  BILATERAL SCREENING MAMMOGRAM WITH TOMOSYNTHESIS  Heterogeneously dense.  Finding 1: Focal asymmetry 12 millimeters with multiple associated calcifications grouped distribution and architectural distortion middle one-third 12:30 left breast located 6 centimeters from the nipple. New since prior.  Finding 2: Stable area of asymmetric breast tissue posterior one-third lateral region of the right breast. No significant change.  IMPRESSION:  Finding 1: Left breast.  Finding 2: Benign-negative.  BIRADS Category 0    2019  [facility]   Radiology  Екатерина Strauss  LEFT DIAGNOSTIC MAMMOGRAM WITH TOMOSYNTHESIS  Heterogeneously dense.  Finding 1: Focal asymmetry in the left breast, 12:30. New focal asymmetry measuring 12 millimeters with multiple associated indistinct calcifications with grouped distribution and architectural distortion in the middle one-third 12:30 left breast located 4 centimeters from the  nipple.  Finding 2: Several fine granular and indistinct calcifications measuring 7 millimeters middle one-third of the left breast at 12-1:00, central located 6 centimeters from the nipple. 4 cm inferior to the dominant finding described above.  Finding 3: Several groups of indistinct calcifications seen in the middle one-third of the left breast at 12-1:00, central. Flank the findings described above, spanning 6 to 7 cm in CC dimension.  LEFT BREAST ULTRASOUND  Finding 1: Irregular solid mass with indistinct margins measuring 11 x 8 x 10 mm.  Finding 4: Oval very small complicated cyst 4 x 2 x 4 mm left breast at 4:00 located 3 centimeters from the nipple.  Finding 5: Oval very small simple cyst, 4 millimeters left breast at 11:00.  Finding 6: Oval complicated cyst 5 x 3 x 4 mm left breast at 12:00 located 2 centimeters from the nipple.  IMPRESSION:  Finding 1: Mass left breast is suspicious.  Finding 2: Calcifications left breast at 12-1:00.  Finding 3: Calcifications middle one-third of the left breast at 12-1:00, suspicious.  Finding 4: Complicated cyst breast at 4:00 located 3 centimeters from the nipple is suspicious.  Finding 5: Left breast at 11:00 benign-negative.  Finding 6: Complicated cyst in the left breast at 12:00 located 2 centimeters from the nipple is suspicious.  BIRADS Category 4C    07/08/2019  [facility]    Radiology  Екатерина Strauss  12:00 left breast middle third on 6 cm from the nipple metallic clip centrally located within an irregular mass 1.5 cm anterior to posterior, 0.8 cm superior to inferior 1.2 cm medial lateral. The metallic clip represents the most posterior of the 2 barrel-shaped metallic clips. The more anterior barrel-shaped clips is located within a post biopsy hematoma cavity.  3:00 middle third 5 cm from the nipple is a metallic clip within postbiopsy hematoma cavity. This represents the top hat shaped metallic clip. No suspicious residual enhancement. The hematoma  measures 4.0 cm.  No other areas of abnormal enhancement left breast. No left axillary internal mammary chain adenopathy.  Scattered enhancement right breast. 0.8 cm oval circumscribed weakly enhancing mass consistent with a fibroadenoma retroareolar region of the right breast at the 6:00.    Pathology:    06/25/2019  [facility]   Radiology  Екатерина Strauss  LEFT UTLRASOUND GUIDED BIOPSY  12:00 position left breast.  12-gauge. 20 cores. Minicork shaped s-anjum tissue marker was placed.  ADDENDUM:  Pathology is concordant.  That this mass was marked with two “barrel” shaped clip.  The mammogram demonstrated several similar groups of calcifications covering a length of approximately 8 cm. Recommend a breast MRI.  In addition the patient had a small nodule that appeared to possibly be a complicated cyst that was attempted to be aspirated but it would not aspirate. It had overall rather benign features. If it was thought to be helpful by biopsying any of the remaining groups of microcalcifcations or this small solid nodule if it would  we would be very happy to do so.  Please note that initially there was a small complex appearing nodule at the 12:00 position for which aspiration was recommended but this appeared to have been sampled with the stereotactic biopsy as it greatly diminished in size. The small nodule at the 4:00 position was solid and fairly benign in appearance and it was elected to do a six month follow up and again if it would  I would be happy to do a core biopsy of this.    06/25/2019  [facility]   Biopsy   Екатерина Strauss  Left breast at the 12:00. 12 core needle biopsy specimens were obtained using a 9-gauge vacuum assisted device. Top hat shaped s-anjum Eviva tissue marker was deployed.  Pathology calcifications at the 12-1:00 represents (ADH). This is concordant.  Ultrasound guided core biopsy of a mass at the 12:30. These areas are  approximately 3 cm apart in the ML projection and 2 cm apart in the CC projection. The patient also had a complicated cyst at the 12:00 position was recommended for aspiration but prior to the attempted aspiration the cyst was no longer present and it was thought to have been removed with the stereotactic biopsy. There was a small complicated appearing cyst at the 4:00 position that was attempted to be aspirated but it now seemed to be solid and was left for a follow up ultrasound in six months. I would recommend a breast MRI.    06/25/2019  [facility]     Pathology  Екатерина Carlos A  DIAGNOSIS  3. Left Breast, 12-1:00, Core Biopsies:  - Single Focus of Atypical Ductal Hyperplasia (ADH), Solid and Cribriform Type, 0.1 cm in Dimension.  - Florid Hyperplasia of Usual Type, Columnar Cell Hyperplasia, Apocrine Change, and Fibrocystic Changes are Also Present.  - Microcalcifications Are Present in Fibroproliferative Changes and in ADH.  4. Left Breast, 12:30, Core Biopsies:  - Invasive Mammary Carcinoma, No Special Type (Ductal), Low Grade, Measuring at Least 0.9 cm in Dimension with Limited Associated Low Grade Cribriform Ductal Carcinoma in Situ (DCIS).  - Microcalcifications Are Present Within the Invasive Carcinoma and in Adjacent Benign Breast Parenchyma with Fibrocystic Changes.  Low grade morphology (tubules = 1, nuclear atypia = 2, and mitoses = 1).    06/25/2019  [facility]     Marker Analysis Екатерина Strauss   ER - 90.78%  CO - 94.21%  HER2 - 1+  Ki-67 - 5.7%    Procedures:      Assessment:   Diagnosis Plan   1. Malignant neoplasm of upper-outer quadrant of left breast in female, estrogen receptor positive (CMS/HCC)     2. Atypical ductal hyperplasia of left breast     3. Breast calcifications on mammogram     4. FH: breast cancer     1-3   LEFT breast biopsy sites on bedside ultrasound at the LEFT 12:00, 5 CFN location and at the LEFT 2:00, 4 CFN location.     At LEFT breast 12:00, 5 CFN location- Initially asymetry and 1.5  cm calcifications on mammo, 1.1 cm mass on ultrasound.  X2- MRI enhancement at the more posterior barrel measuring 1.5 cm  IMC, NST, low grade, 1,2,1, 9mm, associated low grade DCIS,   ER91, SC 94, her 2 artemio 1+, ki 67 is 5.7%.    2-LEFT breast 2:00, 4 CFN location- calcifications  4 cm inferior to above- tophat marker--single focus ADH, solid and cribiform, 1mm, usual hyerplasia, CCC, apocrine chagnes, FCC    - Note additional calcs int he same region (absent on contralateral breast )spanning 8 cm total.    Clinical stage T1cN0- IA   (possibly multifocal, with at least atypia spanning 5 cm from clip to clip)    4-  PGA age uncertain        Plan:  Екатерина, her daughter Jenna and I reviewed her history, imaging, imaging reports and exam together today.    We reviewed the difference in systemic therapy versus locoregional. She knows that she will be seeing a medical oncologist in the adjuvant setting. We discussed that for early stage breast cancer, there are 2 options for locoregional treatment that have equivalent survival: total mastectomy versus lumpectomy and radiation, either with sentinel node biopsy. Based on her imaging and examination, we discussed that a unilateral mastectomy with or without reconstruction would be the recommended surgical treatment. She understands the risks of mastectomy including bleeding, infection, seroma and chance of skin ischemia/necrosis. She understands the risks of  sentinel node biopsy, including 7% chance of lymphedema, injury to nerves or vessels in the axilla,  seroma. We discussed that we will proceed with a frozen section analysis of the sentinel node in the operating room, and if any positivity, would proceed with a completion axillary dissection at that time.    LEFt total mastectomy, LEFT axilla sentinel node biopsy, possible axillary node dissection, immediate recosntruction    We discussed her having a plastic surgical consultation in the preoperative  period, and have arranged that today.   She is interested in a TRAM flap she thinks, over implant.      NCCN guidelines have been followed.  We gave her EMLA cream and tegederm for her sentinel node procedure, and arranged for her to see our nurse navigator.   She will receive a recommendation about radiation after surgery. I am hopeful that she will not need radiation.    We have sent genetics due to her young age and FH and will see this preoperatively.        Sarah Barker MD        We have spent 60 minutes in visit today, 45 in face to face .      Next Appointment:  Return for surgery.      EMR Dragon/transcription disclaimer:    Much of this encounter note is an electronic transcription/translocation of spoken language to printed text.  The electronic translation of spoken language may permit erroneous, or at times, nonsensical words or phrases to be inadvertently transcribed.  Although I have reviewed the note from such areas, some may still exist.

## 2019-08-16 VITALS
SYSTOLIC BLOOD PRESSURE: 112 MMHG | OXYGEN SATURATION: 99 % | HEIGHT: 68 IN | HEART RATE: 72 BPM | DIASTOLIC BLOOD PRESSURE: 64 MMHG | RESPIRATION RATE: 16 BRPM | BODY MASS INDEX: 25.83 KG/M2 | WEIGHT: 170.42 LBS | TEMPERATURE: 97.2 F

## 2019-08-16 PROCEDURE — G0378 HOSPITAL OBSERVATION PER HR: HCPCS

## 2019-08-16 PROCEDURE — 99024 POSTOP FOLLOW-UP VISIT: CPT | Performed by: SURGERY

## 2019-08-16 RX ORDER — OXYCODONE HYDROCHLORIDE AND ACETAMINOPHEN 5; 325 MG/1; MG/1
1 TABLET ORAL EVERY 4 HOURS PRN
Start: 2019-08-16 | End: 2019-08-25

## 2019-08-16 RX ORDER — CYCLOBENZAPRINE HCL 10 MG
10 TABLET ORAL 3 TIMES DAILY PRN
Start: 2019-08-16 | End: 2019-09-06

## 2019-08-16 RX ORDER — HYDROXYZINE PAMOATE 50 MG/1
50 CAPSULE ORAL 4 TIMES DAILY PRN
Start: 2019-08-16 | End: 2019-09-06

## 2019-08-16 RX ADMIN — CYCLOBENZAPRINE 10 MG: 10 TABLET, FILM COATED ORAL at 09:18

## 2019-08-16 RX ADMIN — OXYCODONE HYDROCHLORIDE AND ACETAMINOPHEN 1 TABLET: 10; 325 TABLET ORAL at 02:13

## 2019-08-16 RX ADMIN — CLINDAMYCIN PHOSPHATE 600 MG: 600 INJECTION, SOLUTION INTRAVENOUS at 03:55

## 2019-08-16 RX ADMIN — NITROGLYCERIN 1 INCH: 20 OINTMENT TOPICAL at 00:17

## 2019-08-16 RX ADMIN — MUPIROCIN: 20 OINTMENT TOPICAL at 09:15

## 2019-08-16 RX ADMIN — HYDROXYZINE PAMOATE 50 MG: 50 CAPSULE ORAL at 02:13

## 2019-08-16 RX ADMIN — CELECOXIB 400 MG: 200 CAPSULE ORAL at 09:14

## 2019-08-16 RX ADMIN — Medication 500 MG: at 09:14

## 2019-08-16 RX ADMIN — NITROGLYCERIN 1 INCH: 20 OINTMENT TOPICAL at 05:31

## 2019-08-16 RX ADMIN — CLINDAMYCIN PHOSPHATE 600 MG: 600 INJECTION, SOLUTION INTRAVENOUS at 10:57

## 2019-08-16 NOTE — PROGRESS NOTES
Assessment/Plan Patient is postoperative day #1 from a left mastectomy and sentinel node biopsy by Dr. Sarah Barker and left immediate breast reconstruction with smooth subpectoral tissue expander and AlloDerm placement (because of an inadequate pectoral muscle for expander coverage) by Dr. Tre Levine for a diagnosis of left breast cancer.  She is doing quite well pain is well controlled minimal itching controlled with antihistamines no rashes noted I have reviewed discharge instructions with her and given her a copy of my printed discharge instructions and the nurses will review those with her her diet is regular her activities are limited she is to avoid any arm movement on the left side keep her elbow at her side and may raise her hand no further than her nose at this point she is given a sling to remind her of the restrictions on the left arm.  Her follow-up is Monday morning with Dr. Abner Barker will arrange follow-up with her and I will see her Monday and Friday of next week.  I have reviewed the instructions with her and have encouraged her to use her incentive spirometer at home and  call me if she has any problems or concerns.  Because of major cancer surgery she will require greater than 3 days of opioid narcotics I have encouraged her to use as little of these as possible.  She is stable at the time of discharge    Subjective Patient is postoperative day #1 pain is well controlled with oral medications she is having some itching relieved with Vistaril.  Yet to void    Temp:  [97.2 °F (36.2 °C)-99.2 °F (37.3 °C)] 97.2 °F (36.2 °C)  Heart Rate:  [] 72  Resp:  [12-16] 16  BP: (102-117)/(56-85) 112/64  I/O last 3 completed shifts:  In: 4103 [P.O.:490; I.V.:3563; IV Piggyback:50]  Out: 2678 [Urine:2450; Drains:178; Blood:50]  No intake/output data recorded.    Objective Operative sites look good there is no evidence of hematoma skin is healthy and viable drainage is clearing calves are  soft and nontender hand is neurovascularly intact      Malignant neoplasm of upper-outer quadrant of left breast in female, estrogen receptor positive (CMS/HCC)

## 2019-08-16 NOTE — PLAN OF CARE
Problem: Patient Care Overview  Goal: Plan of Care Review  Outcome: Ongoing (interventions implemented as appropriate)   08/16/19 8472   Coping/Psychosocial   Plan of Care Reviewed With patient   Plan of Care Review   Progress improving   OTHER   Outcome Summary Left chest dressing with telfa, ABD pads, ACE wrap C,D,I. Left chest incision well approximated. No swelling noted. LYLY x3 insertion sites going into 2 bulbs. #1 with large amt bloody output but amt decreasing through the shift. #2 with small amt bloody output. Good UOP from F/C. Plan to remove cath this AM. Percocet for mod pain with good relief.  Generalized itching without rash.  Vistaril given.      Goal: Individualization and Mutuality  Outcome: Ongoing (interventions implemented as appropriate)    Goal: Discharge Needs Assessment  Outcome: Ongoing (interventions implemented as appropriate)    Goal: Interprofessional Rounds/Family Conf  Outcome: Ongoing (interventions implemented as appropriate)      Problem: Breast Surgery/Reconstruction (Adult)  Goal: Signs and Symptoms of Listed Potential Problems Will be Absent, Minimized or Managed (Breast Surgery/Reconstruction)  Outcome: Ongoing (interventions implemented as appropriate)

## 2019-08-16 NOTE — ANESTHESIA POSTPROCEDURE EVALUATION
"Patient: Екатерина Strauss    Procedure Summary     Date:  08/15/19 Room / Location:  [facility] OR  / [facility] MAIN OR    Anesthesia Start:  1112 Anesthesia Stop:  1428    Procedures:       LEFt total mastectomy, LEFT axilla sentinel node biopsy,  immediate recosntruction (Left Breast)      LEFT PLACEMENT TISSUE EXPANDER WITH ALLORDERM (Left Breast) Diagnosis:       Malignant neoplasm of upper-outer quadrant of left breast in female, estrogen receptor positive (CMS/HCC)      (Malignant neoplasm of upper-outer quadrant of left breast in female, estrogen receptor positive (CMS/HCC) [C50.412, Z17.0])    Surgeon:  Sarah Barker MD; PATRICIO Levine MD Provider:  Elfego Hameed MD    Anesthesia Type:  general ASA Status:  2          Anesthesia Type: general  Last vitals  BP   107/56 (08/15/19 1945)   Temp   36.7 °C (98.1 °F) (08/15/19 1715)   Pulse   94 (08/15/19 1945)   Resp   16 (08/15/19 1945)     SpO2   98 % (08/15/19 1945)     Post Anesthesia Care and Evaluation    Patient participation: complete - patient cannot participate  Anesthetic complications: No anesthetic complications    Cardiovascular status: acceptable  Respiratory status: acceptable  Hydration status: acceptable    Comments: Patient was discharged prior to being evaluated by Anesthesia...per chart review, no anesthetic complications were noted.  NOT MEDICALLY DIRECTED  /56 (BP Location: Right arm, Patient Position: Lying)   Pulse 94   Temp 36.7 °C (98.1 °F) (Oral)   Resp 16   Ht 172.7 cm (68\")   Wt 77.3 kg (170 lb 6.7 oz)   LMP  (LMP Unknown)   SpO2 98%   BMI 25.91 kg/m²         "

## 2019-08-16 NOTE — PROGRESS NOTES
INPATIENT ROUNDING NOTE    Postoperative day: 1    Overnight events:     Patient has done well overnight.    no nausea  She reports that her pain is responding favorably to the prescribed meds.  She has voided since her catheter has been removed.  She has ambulated.    Exam:  Temp:  [97.2 °F (36.2 °C)-99.2 °F (37.3 °C)] 97.2 °F (36.2 °C)  Heart Rate:  [] 72  Resp:  [12-16] 16  BP: (102-117)/(56-85) 112/64       On examination, her incisions are dressed and dressings clean dry and intact.  Her mastectomy flaps are well perfused with minimal ecchymoses.   There are no undrained fluid collections.      Assessment and plan:    Breast cancer, postoperative day 1.  Doing well.  HLIV.  Likely home today after drain teaching and breakfast and/or lunch.    She has an appointment with me in the office already scheduled for her postop visit.

## 2019-08-16 NOTE — PROGRESS NOTES
Discharge Planning Assessment  [facility]     Patient Name: Екатерина Strauss  MRN: [id number]  Today's Date: 8/16/2019    Admit Date: 8/15/2019        Discharge Plan     Row Name 08/16/19 1041       Plan    Plan  Home w/o needs    Plan Comments  Pt confirmed the address and [facility] are correct.  EPIC updated to reflect Dr. Tabby Velarde as PCP. Pt said her dtr Jenna Mccallum lives with her but will be going back to school soon but her mother will also be able to assist her. Pt said she is IADL, uses no DME, can afford her Rx and plans to return home and does not anticipate any needs, pt said she has never had home health.     Prachi Andrea RN    Final Discharge Disposition Code  01 - home or self-care       Expected Discharge Date and Time     Expected Discharge Date Expected Discharge Time    Aug 16, 2019      Prachi Andrea, RN

## 2019-08-20 ENCOUNTER — TELEPHONE (OUTPATIENT)
Dept: SURGERY | Facility: CLINIC | Age: 49
End: 2019-08-20

## 2019-08-20 DIAGNOSIS — Z17.0 CARCINOMA OF UPPER-OUTER QUADRANT OF LEFT BREAST IN FEMALE, ESTROGEN RECEPTOR POSITIVE (HCC): Primary | ICD-10-CM

## 2019-08-20 DIAGNOSIS — C50.412 CARCINOMA OF UPPER-OUTER QUADRANT OF LEFT BREAST IN FEMALE, ESTROGEN RECEPTOR POSITIVE (HCC): Primary | ICD-10-CM

## 2019-08-20 NOTE — TELEPHONE ENCOUNTER
Pathology from her LEFT TM and SLNB with reconstruction returned as:  Invasive mammary carcinoma, no special type, 1.2 cm, low-grade, 1, 2, 1, no lymphovascular invasion, duct carcinoma in situ, 8 mm, low-grade, margins clear.  Closest margin is 9 mm to the invasive.  The second biopsy site is commented that there are fibrosis and biopsy site changes with no residual atypia or invasive carcinoma.0/2 nodes.  Pathologic stage T1c N0 stage Ia    Dr Levine has removed one drain.    She will not need radiation.  I will call and let her know    I will arrange for her to see medical oncology.  I will also send off an Oncotype.        Final Diagnosis   1. Left Breast [place] Lymph Node #1:               A. One lymph node negative for metastatic carcinoma (0/1).                 2. Left Breast [place] Lymph Node #2:               A. One lymph node negative for metastatic carcinoma (0/1).     3. Left Breast, Total Mastectomy:               A. Invasive ductal carcinoma, not otherwise specified.                            1. Invasive carcinoma measures 12 mm x 12 mm x 10 mm.                            2. Overall [place] grade I (glandular score = 1, nuclear score = 2, mitotic score = 1).                            3. No lymphovascular space invasion identified.               B. Associated ductal carcinoma in situ (DCIS).                            1. Focus of DCIS spans 8 mm x 6 mm x  6mm.                            2. Low nuclear grade cribriform DCIS without necrosis.                           3.  DCIS measures at least 10 mm from all inked surgical margins.                  C. Invasive carcinoma measures 8.8 mm from the closest (Anterior) margin of excision.                   All other margins measure at least 35 mm from invasive carcinoma including:                        Superior margin = 90 mm.                        Inferior margin = 150 mm.                        Posterior margin = 35 mm.                         Lateral margin = 100 mm.                        Medial margin = 100 mm.                  D. No pagetoid involvement of skin nor nipple identified.               E. Microcalcifications are identified within foci of invasive carcinoma, DCIS and benign breast tissue.               F. Nonneoplastic breast tissue with fibrocystic change, scattered adenosis, focal moderate to marked ductal                    hyperplasia of usual type and fibroadenomatous hyperplasia noted.               G. Gross and microscopic changes of three biopsy sites and clips noted.               H. Previously reported biomarkers: estrogen receptor: Positive (91%), progesterone receptor: Positive (94%),                    HER-2/artemio: Negative, Ki-67 = 5.7% ([id number]) (not reviewed).     Pathologic Stage: pT1c (sn) N0 (G1)  See Synoptic for tumor details.        jat/jstito    Electronically signed by Giacomo Contreras MD on 8/19/2019 at 1322   Synoptic Checklist   INVASIVE CARCINOMA OF THE BREAST   Breast Invasive - All Specimens   CLINICAL   Radiologic Finding  Mass or architectural distortion      Calcifications    SPECIMEN   Procedure  Total mastectomy    Specimen Laterality  Left    TUMOR   Tumor Site: Invasive Carcinoma  Upper outer quadrant    Clock Position of Tumor Site  1 o'clock      12 o'clock    Histologic Type  Invasive carcinoma of no special type (ductal, not otherwise specified)    Glandular (Acinar) / Tubular Differentiation  Score 1    Nuclear Pleomorphism  Score 2    Mitotic Rate  Score 1 (<=3 mitoses per mm2)    Number of Mitoses per 10 High-Power Fields  3 mitotic figures   Diameter of Microscope Field in Millimeters (mm)  0.55 Millimeters (mm)   Overall Grade  Grade 1 (scores of 3, 4 or 5)    Tumor Size  Greatest dimension of largest invasive focus in Millimeters (mm): 12 Millimeters (mm)   Additional Dimension in Millimeters (mm)  12 Millimeters (mm)     10 Millimeters (mm)   Tumor Focality  Single focus of invasive carcinoma     Ductal Carcinoma In Situ (DCIS)  Present      Negative for extensive intraductal component (EIC)    Size (Extent) of DCIS  Estimated size of DCIS greatest dimension in Millimeters (mm) is at least: 8 Millimeters (mm)   Additional Dimension in Millimeters (mm)  6 Millimeters (mm)     6 Millimeters (mm)   Architectural Patterns  Cribriform    Nuclear Grade  Grade I (low)    Necrosis  Not identified    Lobular Carcinoma In Situ (LCIS)  No LCIS in specimen    Tumor Extent     Nipple DCIS  DCIS does not involve the nipple epidermis    Accessory Findings     Lymphovascular Invasion  Not identified    Dermal Lymphovascular Invasion  Not identified    Microcalcifications  Present in DCIS      Present in invasive carcinoma      Present in non-neoplastic tissue    Treatment Effect  No known presurgical therapy    MARGINS   Invasive Carcinoma Margins  Uninvolved by invasive carcinoma    Distance from Anterior Margin in Millimeters (mm)  8.8 Millimeters (mm)   Distance from Posterior Margin in Millimeters (mm)  35 Millimeters (mm)   Distance from Superior Margin in Millimeters (mm)  90 Millimeters (mm)   Distance from Inferior Margin in Millimeters (mm)  150 Millimeters (mm)   Distance from Medial Margin in Millimeters (mm)  100 Millimeters (mm)   Distance from Lateral Margin in Millimeters (mm)  100 Millimeters (mm)   Distance from Closest Margin in Millimeters (mm)  8.8 Millimeters (mm)   Closest Margin  Anterior    DCIS Margins  Uninvolved by DCIS    Distance of DCIS to Anterior Margin in Millimeters (mm)  10 Millimeters (mm)   Distance of DCIS to Posterior Margin in Millimeters (mm)  35 Millimeters (mm)   Distance of DCIS to Superior Margin in Millimeters (mm)  90 Millimeters (mm)   Distance of DCIS to Inferior Margin in Millimeters (mm)  150 Millimeters (mm)   Distance of DCIS to Medial Margin in Millimeters (mm)  100 Millimeters (mm)   Distance of DCIS to Lateral Margin in Millimeters (mm)  100 Millimeters (mm)    Distance of DCIS from Closest Margin in Millimeters (mm)  10 Millimeters (mm)   Closest Margin  Anterior    LYMPH NODES   Regional Lymph Nodes  Uninvolved by tumor cells    Number of Lymph Nodes Examined  2    Number of [place] Nodes Examined  2    PATHOLOGIC STAGE CLASSIFICATION (pTNM, AJCC 8th Edition)   TNM Descriptors  Not applicable    Primary Tumor (Invasive Carcinoma) (pT)  pT1c    Regional Lymph Nodes (pN)     Modifier  (sn): Only sentinel node(s) evaluated.    Category (pN)  pN0    .      Comment    Immunostains for P63 were performed on blocks 3D and 3E utilizing appropriate controls.  Immunostains for CK5/6, estrogen receptors and progesterone receptors were performed on block 3D utilizing appropriate controls.  The invasive components lack an intact myoepithelial layer by P63 staining.  Rare foci suspicious for ductal carcinoma in situ (DCIS) showed intact myoepithelial layers by P63 staining, and lacked CK5/6 immunoreactivity. DCIS is strongly positive for Estrogen receptors (100%, Trevor 8) and Progesterone receptors (85%, Trevor 8).  The biopsy site changes grossly identified away from the primary mass lesion showed only stromal fibrosis, foreign body giant cell reaction and organizing fat necrosis consistent with previous biopsy site.  No in situ nor infiltrating carcinoma was identified associated with this more distant biopsy site.

## 2019-08-26 ENCOUNTER — TELEPHONE (OUTPATIENT)
Dept: OTHER | Facility: HOSPITAL | Age: 49
End: 2019-08-26

## 2019-08-26 NOTE — TELEPHONE ENCOUNTER
Tried calling Ms. Strauss to see how she was doing and to reschedule our appointment from earlier this month. I could not leave a message due to mailbox being full.

## 2019-08-27 ENCOUNTER — DOCUMENTATION (OUTPATIENT)
Dept: OTHER | Facility: HOSPITAL | Age: 49
End: 2019-08-27

## 2019-08-27 ENCOUNTER — OFFICE VISIT (OUTPATIENT)
Dept: SURGERY | Facility: CLINIC | Age: 49
End: 2019-08-27

## 2019-08-27 VITALS
SYSTOLIC BLOOD PRESSURE: 124 MMHG | DIASTOLIC BLOOD PRESSURE: 68 MMHG | WEIGHT: 169.4 LBS | HEIGHT: 69 IN | BODY MASS INDEX: 25.09 KG/M2 | OXYGEN SATURATION: 99 % | HEART RATE: 102 BPM

## 2019-08-27 DIAGNOSIS — C50.412 CARCINOMA OF UPPER-OUTER QUADRANT OF LEFT BREAST IN FEMALE, ESTROGEN RECEPTOR POSITIVE (HCC): Primary | ICD-10-CM

## 2019-08-27 DIAGNOSIS — N60.92 ATYPICAL DUCTAL HYPERPLASIA OF LEFT BREAST: ICD-10-CM

## 2019-08-27 DIAGNOSIS — Z17.0 CARCINOMA OF UPPER-OUTER QUADRANT OF LEFT BREAST IN FEMALE, ESTROGEN RECEPTOR POSITIVE (HCC): Primary | ICD-10-CM

## 2019-08-27 DIAGNOSIS — Z80.3 FH: BREAST CANCER: ICD-10-CM

## 2019-08-27 DIAGNOSIS — R92.1 BREAST CALCIFICATIONS ON MAMMOGRAM: ICD-10-CM

## 2019-08-27 PROCEDURE — 99024 POSTOP FOLLOW-UP VISIT: CPT | Performed by: SURGERY

## 2019-08-27 RX ORDER — DOXYCYCLINE HYCLATE 100 MG/1
CAPSULE ORAL
COMMUNITY
Start: 2019-08-16 | End: 2019-09-06

## 2019-08-27 NOTE — PROGRESS NOTES
Chief Complaint: Екатерина Strauss is a 49 y.o. female who was seen in consultation at the request of  Jim Rodríguez II, MD   for newly diagnosed breast cancer and a postoperative visit    History of Present Illness:  Patient presents with newly diagnosed breast cancer, LEFT breast She noted no new masses, skin changes, nipple discharge, nipple changes prior to her most recent imaging.  Her most recent imaging includes the followin2019  [facility]   Mammo  Екатерина Strauss  Seen for yearly screening.  BILATERAL SCREENING MAMMOGRAM WITH TOMOSYNTHESIS  Heterogeneously dense.  Finding 1: Focal asymmetry 12 millimeters with multiple associated calcifications grouped distribution and architectural distortion middle one-third 12:30 left breast located 6 centimeters from the nipple. New since prior.  Finding 2: Stable area of asymmetric breast tissue posterior one-third lateral region of the right breast. No significant change.  IMPRESSION:  Finding 1: Left breast.  Finding 2: Benign-negative.  BIRADS Category 0    2019  [facility]   Radiology  Екатерина Strauss  LEFT DIAGNOSTIC MAMMOGRAM WITH TOMOSYNTHESIS  Heterogeneously dense.  Finding 1: Focal asymmetry in the left breast, 12:30. New focal asymmetry measuring 12 millimeters with multiple associated indistinct calcifications with grouped distribution and architectural distortion in the middle one-third 12:30 left breast located 4 centimeters from the nipple.  Finding 2: Several fine granular and indistinct calcifications measuring 7 millimeters middle one-third of the left breast at 12-1:00, central located 6 centimeters from the nipple. 4 cm inferior to the dominant finding described above.  Finding 3: Several groups of indistinct calcifications seen in the middle one-third of the left breast at 12-1:00, central. Flank the findings described above, spanning 6 to 7 cm in CC dimension.  LEFT BREAST ULTRASOUND  Finding 1:  Irregular solid mass with indistinct margins measuring 11 x 8 x 10 mm.  Finding 4: Oval very small complicated cyst 4 x 2 x 4 mm left breast at 4:00 located 3 centimeters from the nipple.  Finding 5: Oval very small simple cyst, 4 millimeters left breast at 11:00.  Finding 6: Oval complicated cyst 5 x 3 x 4 mm left breast at 12:00 located 2 centimeters from the nipple.  IMPRESSION:  Finding 1: Mass left breast is suspicious.  Finding 2: Calcifications left breast at 12-1:00.  Finding 3: Calcifications middle one-third of the left breast at 12-1:00, suspicious.  Finding 4: Complicated cyst breast at 4:00 located 3 centimeters from the nipple is suspicious.  Finding 5: Left breast at 11:00 benign-negative.  Finding 6: Complicated cyst in the left breast at 12:00 located 2 centimeters from the nipple is suspicious.  BIRADS Category 4C    07/08/2019  [facility]    Radiology  Екатерина Strauss  12:00 left breast middle third on 6 cm from the nipple metallic clip centrally located within an irregular mass 1.5 cm anterior to posterior, 0.8 cm superior to inferior 1.2 cm medial lateral. The metallic clip represents the most posterior of the 2 barrel-shaped metallic clips. The more anterior barrel-shaped clips is located within a post biopsy hematoma cavity.  3:00 middle third 5 cm from the nipple is a metallic clip within postbiopsy hematoma cavity. This represents the top hat shaped metallic clip. No suspicious residual enhancement. The hematoma measures 4.0 cm.  No other areas of abnormal enhancement left breast. No left axillary internal mammary chain adenopathy.  Scattered enhancement right breast. 0.8 cm oval circumscribed weakly enhancing mass consistent with a fibroadenoma retroareolar region of the right breast at the 6:00.      She had a biopsy on the following day that showed:     06/25/2019  [facility]   Radiology  Екатерина Strauss  LEFT UTLRASOUND GUIDED BIOPSY  12:00 position left  breast.  12-gauge. 20 cores. Minicork shaped s-anjum tissue marker was placed.  ADDENDUM:  Pathology is concordant.  That this mass was marked with two “barrel” shaped clip.  The mammogram demonstrated several similar groups of calcifications covering a length of approximately 8 cm. Recommend a breast MRI.  In addition the patient had a small nodule that appeared to possibly be a complicated cyst that was attempted to be aspirated but it would not aspirate. It had overall rather benign features. If it was thought to be helpful by biopsying any of the remaining groups of microcalcifcations or this small solid nodule if it would  we would be very happy to do so.  Please note that initially there was a small complex appearing nodule at the 12:00 position for which aspiration was recommended but this appeared to have been sampled with the stereotactic biopsy as it greatly diminished in size. The small nodule at the 4:00 position was solid and fairly benign in appearance and it was elected to do a six month follow up and again if it would  I would be happy to do a core biopsy of this.    06/25/2019  [facility]   Biopsy   Екатерина Strauss  Left breast at the 12:00. 12 core needle biopsy specimens were obtained using a 9-gauge vacuum assisted device. Top hat shaped s-anjum Eviva tissue marker was deployed.  Pathology calcifications at the 12-1:00 represents (ADH). This is concordant.  Ultrasound guided core biopsy of a mass at the 12:30. These areas are approximately 3 cm apart in the ML projection and 2 cm apart in the CC projection. The patient also had a complicated cyst at the 12:00 position was recommended for aspiration but prior to the attempted aspiration the cyst was no longer present and it was thought to have been removed with the stereotactic biopsy. There was a small complicated appearing cyst at the 4:00 position that was attempted to be aspirated but it now seemed  to be solid and was left for a follow up ultrasound in six months. I would recommend a breast MRI.    06/25/2019  [facility]     Pathology  Екатерина Strauss  DIAGNOSIS  1. Left Breast, 12-1:00, Core Biopsies:  - Single Focus of Atypical Ductal Hyperplasia (ADH), Solid and Cribriform Type, 0.1 cm in Dimension.  - Florid Hyperplasia of Usual Type, Columnar Cell Hyperplasia, Apocrine Change, and Fibrocystic Changes are Also Present.  - Microcalcifications Are Present in Fibroproliferative Changes and in ADH.  2. Left Breast, 12:30, Core Biopsies:  - Invasive Mammary Carcinoma, No Special Type (Ductal), Low Grade, Measuring at Least 0.9 cm in Dimension with Limited Associated Low Grade Cribriform Ductal Carcinoma in Situ (DCIS).  - Microcalcifications Are Present Within the Invasive Carcinoma and in Adjacent Benign Breast Parenchyma with Fibrocystic Changes.  Low grade morphology (tubules = 1, nuclear atypia = 2, and mitoses = 1).    06/25/2019  [facility]     Marker Analysis Екатерина Strauss   ER - 90.78%  NM - 94.21%  HER2 - 1+  Ki-67 - 5.7%    She has not had a breast biopsy in the past.  She has her ovaries, had her uterus removed for bleeding, and takes nor hormones.  Her family history includes the following:   She has 1 daughter, 1 son, no sisters, one paternal aunt, 3 maternal aunts.  She has a paternal great aunt who had breast cancer at uncertain age.    Interval history:  Pathology from her LEFT TM and SLNB with reconstruction returned as:  Invasive mammary carcinoma, no special type, 1.2 cm, low-grade, 1, 2, 1, no lymphovascular invasion, duct carcinoma in situ, 8 mm, low-grade, margins clear.  Closest margin is 9 mm to the invasive.  The second biopsy site is commented that there are fibrosis and biopsy site changes with no residual atypia or invasive carcinoma.0/2 nodes.  Pathologic stage T1c N0 stage Ia    SHe has had one drain removed.  She denies redness, warmth, drainage from incision.  Drain is serous, 20 cc per  day.    Review of Systems:  Review of Systems   Constitutional: Positive for unexpected weight change (1.4 lb wt gain).   All other systems reviewed and are negative.       Past Medical and Surgical History:  Breast Biopsy History:  Patient had not had a breast biopsy prior to her cancer diagnosis.  Breast Cancer HIstory:  Patient does not have a past medical history of breast cancer.  Breast Operations, and year:  NONE   Obstetric/Gynecologic History:  Age menstrual periods began: 10  Patient is postmenopausal due to removal of her uterus in the following year: 2008   Number of pregnancies: 4  Number of live births: 2  Number of abortions or miscarriages: 2  Age of delivery of first child: 23  Patient breast fed, for the following lenth of time: 3-4 MONTHS PER CHILD.   Length of time taking birth control pills: 20 YRS.   Patient has never taken hormone replacement  UTERUS REMOVED 2008.     Past Surgical History:   Procedure Laterality Date   • BREAST BIOPSY     • BREAST RECONSTRUCTION Left 8/15/2019    Procedure: LEFT PLACEMENT TISSUE EXPANDER WITH ALLORDERM;  Surgeon: PATRICIO Levine MD;  Location: [facility];  Service: Plastics   • DILATATION AND CURETTAGE     • FOOT SURGERY Bilateral     BUNIONECTOMY-2005 AND 2015   • HYSTERECTOMY  08/2008   • MASTECTOMY W/ SENTINEL NODE BIOPSY Left 8/15/2019    Procedure: LEFt total mastectomy, LEFT axilla sentinel node biopsy,  immediate recosntruction;  Surgeon: Sarah Barker MD;  Location: [facility];  Service: General   • TIBIA FRACTURE SURGERY Right 1980   • WISDOM TOOTH EXTRACTION         Past Medical History:   Diagnosis Date   • Cancer ([facility]) 05/2019    LEFT BREAST   • Fibrocystic breast    • PONV (postoperative nausea and vomiting)    • Seasonal allergies        Prior Hospitalizations, other than for surgery or childbirth, and year:  NONE     Social History     Socioeconomic History   • Marital status:      Spouse name: Not on file   • Number  "of children: Not on file   • Years of education: Not on file   • Highest education level: Not on file   Tobacco Use   • Smoking status: Never Smoker   • Smokeless tobacco: Never Used   Substance and Sexual Activity   • Alcohol use: Yes     Comment: 3 PER MONTH-SOCIALLY   • Drug use: No   • Sexual activity: Defer     Patient is .  Patient is employed full time with the following occupation: CUSTOMER SERVICE  Patient drinks 2 servings of caffeine per day.    Family History:  Family History   Problem Relation Age of Onset   • Diabetes Mother    • Cancer Maternal Aunt         OF UNKNOWN ORGIN   • Diabetes Maternal Aunt    • Breast cancer Paternal Aunt         PATERNAL GREAT AUNT-UNKNOLWN AGE    • Lung cancer Maternal Grandmother    • Brain cancer Maternal Grandmother    • Malig Hyperthermia Neg Hx        Vital Signs:  /68 (BP Location: Right arm, Patient Position: Sitting, Cuff Size: Adult)   Pulse 102   Ht 174 cm (68.5\")   Wt 76.8 kg (169 lb 6.4 oz)   LMP  (LMP Unknown)   SpO2 99%   Breastfeeding? No   BMI 25.38 kg/m²      Medications:    Current Outpatient Medications:   •  cyclobenzaprine (FLEXERIL) 10 MG tablet, Take 1 tablet by mouth 3 (Three) Times a Day As Needed for Muscle Spasms., Disp: , Rfl:   •  doxycycline (VIBRAMYCIN) 100 MG capsule, , Disp: , Rfl:   •  hydrOXYzine pamoate (VISTARIL) 50 MG capsule, Take 1 capsule by mouth 4 (Four) Times a Day As Needed for Itching., Disp: , Rfl:   •  Multiple Vitamin (MULTIVITAMIN) tablet, Take 1 tablet by mouth Daily. LAST DOSE 8/5/19, Disp: , Rfl:   •  mupirocin (BACTROBAN) 2 % ointment, Apply  topically to the appropriate area as directed Daily., Disp: , Rfl:      Allergies:  Allergies   Allergen Reactions   • Codeine Rash     As a child, does not recall the severity of the reaction       Physical Examination:  /68 (BP Location: Right arm, Patient Position: Sitting, Cuff Size: Adult)   Pulse 102   Ht 174 cm (68.5\")   Wt 76.8 kg (169 lb 6.4 " oz)   LMP  (LMP Unknown)   SpO2 99%   Breastfeeding? No   BMI 25.38 kg/m²   General Appearance:  Patient is in no distress.  She is well kept and has an thin build.   Psychiatric:  Patient with appropriate mood and affect. Alert and oriented to self, time, and place.    Breast, RIGHT:  medium sized, 34D, asymmetric with the contralateral surgically absent side.  Breast skin is without erythema, edema, rashes.  There are no visible abnormalities upon inspection during the arm-raising maneuver or with hands on hips in the sitting position. There is no nipple retraction, discharge or nipple/areolar skin changes.There are no masses palpable in the sitting or supine positions.    Breast, LEFT:  surgically absent with expander in place and unexpanded. Well healing transverse incision with no erythema, warmth, draiange.    Lymphatic:  There is no axillary, cervical, infraclavicular, or supraclavicular adenopathy bilaterally.  Eyes:  Pupils are round and reactive to light.  Cardiovascular:  Heart rate and rhythm are regular.  Respiratory:  Lungs are clear bilaterally with no crackles or wheezes in any lung field.  Gastrointestinal:  Abdomen is soft, nondistended, and nontender. There are no scars from previous surgery.    Musculoskeletal:  Good strength in all 4 extremities.   There is good range of motion in both shoulders.    Skin:  No new skin lesions or rashes on the skin excluding the breast (see breast exam above).        Imagin2016  [facility]   Mammo  Екатеринаnile Adamsrer  BILATERAL MAMMOGRAPHY  Heterogeneously dense.  Finding 1: Stable area of asymmetric breast tissue with associated punctate calcifications regional distribution superior region of the left breast. No significant change.  Finding 2: There stable areas of asymmetric breast tissue seen in both breasts.  BIRADS Category 2, Benign.    2019  [facility]   Mammo  Екатеринаnile Strauss  Seen for yearly screening.  BILATERAL  SCREENING MAMMOGRAM WITH TOMOSYNTHESIS  Heterogeneously dense.  Finding 1: Focal asymmetry 12 millimeters with multiple associated calcifications grouped distribution and architectural distortion middle one-third 12:30 left breast located 6 centimeters from the nipple. New since prior.  Finding 2: Stable area of asymmetric breast tissue posterior one-third lateral region of the right breast. No significant change.  IMPRESSION:  Finding 1: Left breast.  Finding 2: Benign-negative.  BIRADS Category 0    05/13/2019  [facility]   Radiology  Екатерина Strauss  LEFT DIAGNOSTIC MAMMOGRAM WITH TOMOSYNTHESIS  Heterogeneously dense.  Finding 1: Focal asymmetry in the left breast, 12:30. New focal asymmetry measuring 12 millimeters with multiple associated indistinct calcifications with grouped distribution and architectural distortion in the middle one-third 12:30 left breast located 4 centimeters from the nipple.  Finding 2: Several fine granular and indistinct calcifications measuring 7 millimeters middle one-third of the left breast at 12-1:00, central located 6 centimeters from the nipple. 4 cm inferior to the dominant finding described above.  Finding 3: Several groups of indistinct calcifications seen in the middle one-third of the left breast at 12-1:00, central. Flank the findings described above, spanning 6 to 7 cm in CC dimension.  LEFT BREAST ULTRASOUND  Finding 1: Irregular solid mass with indistinct margins measuring 11 x 8 x 10 mm.  Finding 4: Oval very small complicated cyst 4 x 2 x 4 mm left breast at 4:00 located 3 centimeters from the nipple.  Finding 5: Oval very small simple cyst, 4 millimeters left breast at 11:00.  Finding 6: Oval complicated cyst 5 x 3 x 4 mm left breast at 12:00 located 2 centimeters from the nipple.  IMPRESSION:  Finding 1: Mass left breast is suspicious.  Finding 2: Calcifications left breast at 12-1:00.  Finding 3: Calcifications middle one-third of the left breast at  12-1:00, suspicious.  Finding 4: Complicated cyst breast at 4:00 located 3 centimeters from the nipple is suspicious.  Finding 5: Left breast at 11:00 benign-negative.  Finding 6: Complicated cyst in the left breast at 12:00 located 2 centimeters from the nipple is suspicious.  BIRADS Category 4C    07/08/2019  [facility]    Radiology  Екатерина Strauss  12:00 left breast middle third on 6 cm from the nipple metallic clip centrally located within an irregular mass 1.5 cm anterior to posterior, 0.8 cm superior to inferior 1.2 cm medial lateral. The metallic clip represents the most posterior of the 2 barrel-shaped metallic clips. The more anterior barrel-shaped clips is located within a post biopsy hematoma cavity.  3:00 middle third 5 cm from the nipple is a metallic clip within postbiopsy hematoma cavity. This represents the top hat shaped metallic clip. No suspicious residual enhancement. The hematoma measures 4.0 cm.  No other areas of abnormal enhancement left breast. No left axillary internal mammary chain adenopathy.  Scattered enhancement right breast. 0.8 cm oval circumscribed weakly enhancing mass consistent with a fibroadenoma retroareolar region of the right breast at the 6:00.    Pathology:    06/25/2019  [facility]   Radiology  Екатерина Strauss  LEFT UTLRASOUND GUIDED BIOPSY  12:00 position left breast.  12-gauge. 20 cores. Minicork shaped s-anjum tissue marker was placed.  ADDENDUM:  Pathology is concordant.  That this mass was marked with two “barrel” shaped clip.  The mammogram demonstrated several similar groups of calcifications covering a length of approximately 8 cm. Recommend a breast MRI.  In addition the patient had a small nodule that appeared to possibly be a complicated cyst that was attempted to be aspirated but it would not aspirate. It had overall rather benign features. If it was thought to be helpful by biopsying any of the remaining groups of microcalcifcations or this  small solid nodule if it would  we would be very happy to do so.  Please note that initially there was a small complex appearing nodule at the 12:00 position for which aspiration was recommended but this appeared to have been sampled with the stereotactic biopsy as it greatly diminished in size. The small nodule at the 4:00 position was solid and fairly benign in appearance and it was elected to do a six month follow up and again if it would  I would be happy to do a core biopsy of this.    06/25/2019  [facility]   Biopsy   Екатерина Strauss  Left breast at the 12:00. 12 core needle biopsy specimens were obtained using a 9-gauge vacuum assisted device. Top hat shaped s-anjum Eviva tissue marker was deployed.  Pathology calcifications at the 12-1:00 represents (ADH). This is concordant.  Ultrasound guided core biopsy of a mass at the 12:30. These areas are approximately 3 cm apart in the ML projection and 2 cm apart in the CC projection. The patient also had a complicated cyst at the 12:00 position was recommended for aspiration but prior to the attempted aspiration the cyst was no longer present and it was thought to have been removed with the stereotactic biopsy. There was a small complicated appearing cyst at the 4:00 position that was attempted to be aspirated but it now seemed to be solid and was left for a follow up ultrasound in six months. I would recommend a breast MRI.    06/25/2019  [facility]     Pathology  Екатерина Strauss  DIAGNOSIS  3. Left Breast, 12-1:00, Core Biopsies:  - Single Focus of Atypical Ductal Hyperplasia (ADH), Solid and Cribriform Type, 0.1 cm in Dimension.  - Florid Hyperplasia of Usual Type, Columnar Cell Hyperplasia, Apocrine Change, and Fibrocystic Changes are Also Present.  - Microcalcifications Are Present in Fibroproliferative Changes and in ADH.  4. Left Breast, 12:30, Core Biopsies:  - Invasive Mammary Carcinoma, No Special Type (Ductal), Low  Grade, Measuring at Least 0.9 cm in Dimension with Limited Associated Low Grade Cribriform Ductal Carcinoma in Situ (DCIS).  - Microcalcifications Are Present Within the Invasive Carcinoma and in Adjacent Benign Breast Parenchyma with Fibrocystic Changes.  Low grade morphology (tubules = 1, nuclear atypia = 2, and mitoses = 1).    06/25/2019  [facility]     Marker Analysis Екатерина Strauss   ER - 90.78%  VT - 94.21%  HER2 - 1+  Ki-67 - 5.7%    Pathology from her LEFT TM and SLNB with reconstruction returned as:  Invasive mammary carcinoma, no special type, 1.2 cm, low-grade, 1, 2, 1, no lymphovascular invasion, duct carcinoma in situ, 8 mm, low-grade, margins clear.  Closest margin is 9 mm to the invasive.  The second biopsy site is commented that there are fibrosis and biopsy site changes with no residual atypia or invasive carcinoma.0/2 nodes.  Pathologic stage T1c N0 stage Ia               Final Diagnosis   1. Left Breast Feura Bush Lymph Node #1:               A. One lymph node negative for metastatic carcinoma (0/1).     2. Left Breast Feura Bush Lymph Node #2:               A. One lymph node negative for metastatic carcinoma (0/1).     3. Left Breast, Total Mastectomy:               A. Invasive ductal carcinoma, not otherwise specified.                            1. Invasive carcinoma measures 12 mm x 12 mm x 10 mm.                            2. Overall [place] grade I (glandular score = 1, nuclear score = 2, mitotic score = 1).                            3. No lymphovascular space invasion identified.               B. Associated ductal carcinoma in situ (DCIS).                            1. Focus of DCIS spans 8 mm x 6 mm x  6mm.                            2. Low nuclear grade cribriform DCIS without necrosis.                           3.  DCIS measures at least 10 mm from all inked surgical margins.                  C. Invasive carcinoma measures 8.8 mm from the closest (Anterior) margin of  excision.                   All other margins measure at least 35 mm from invasive carcinoma including:                        Superior margin = 90 mm.                        Inferior margin = 150 mm.                        Posterior margin = 35 mm.                        Lateral margin = 100 mm.                        Medial margin = 100 mm.                  D. No pagetoid involvement of skin nor nipple identified.               E. Microcalcifications are identified within foci of invasive carcinoma, DCIS and benign breast tissue.               F. Nonneoplastic breast tissue with fibrocystic change, scattered adenosis, focal moderate to marked ductal                    hyperplasia of usual type and fibroadenomatous hyperplasia noted.               G. Gross and microscopic changes of three biopsy sites and clips noted.               H. Previously reported biomarkers: estrogen receptor: Positive (91%), progesterone receptor: Positive (94%),                    HER-2/artemio: Negative, Ki-67 = 5.7% ([id number]) (not reviewed).     Pathologic Stage: pT1c (sn) N0 (G1)  See Synoptic for tumor details.        jat/jstito    Electronically signed by Giacomo Contreras MD on 8/19/2019 at 1322   Synoptic Checklist   INVASIVE CARCINOMA OF THE BREAST   Breast Invasive - All Specimens          CLINICAL   Radiologic Finding   Mass or architectural distortion        Calcifications    SPECIMEN   Procedure   Total mastectomy    Specimen Laterality   Left    TUMOR   Tumor Site: Invasive Carcinoma   Upper outer quadrant    Clock Position of Tumor Site   1 o'clock        12 o'clock    Histologic Type   Invasive carcinoma of no special type (ductal, not otherwise specified)    Glandular (Acinar) / Tubular Differentiation   Score 1    Nuclear Pleomorphism   Score 2    Mitotic Rate   Score 1 (<=3 mitoses per mm2)    Number of Mitoses per 10 High-Power Fields   3 mitotic figures   Diameter of Microscope Field in Millimeters (mm)   0.55 Millimeters  (mm)   Overall Grade   Grade 1 (scores of 3, 4 or 5)    Tumor Size   Greatest dimension of largest invasive focus in Millimeters (mm): 12 Millimeters (mm)   Additional Dimension in Millimeters (mm)   12 Millimeters (mm)       10 Millimeters (mm)   Tumor Focality   Single focus of invasive carcinoma    Ductal Carcinoma In Situ (DCIS)   Present        Negative for extensive intraductal component (EIC)    Size (Extent) of DCIS   Estimated size of DCIS greatest dimension in Millimeters (mm) is at least: 8 Millimeters (mm)   Additional Dimension in Millimeters (mm)   6 Millimeters (mm)       6 Millimeters (mm)   Architectural Patterns   Cribriform    Nuclear Grade   Grade I (low)    Necrosis   Not identified    Lobular Carcinoma In Situ (LCIS)   No LCIS in specimen    Tumor Extent       Nipple DCIS   DCIS does not involve the nipple epidermis    Accessory Findings       Lymphovascular Invasion   Not identified    Dermal Lymphovascular Invasion   Not identified    Microcalcifications   Present in DCIS        Present in invasive carcinoma        Present in non-neoplastic tissue    Treatment Effect   No known presurgical therapy    MARGINS   Invasive Carcinoma Margins   Uninvolved by invasive carcinoma    Distance from Anterior Margin in Millimeters (mm)   8.8 Millimeters (mm)   Distance from Posterior Margin in Millimeters (mm)   35 Millimeters (mm)   Distance from Superior Margin in Millimeters (mm)   90 Millimeters (mm)   Distance from Inferior Margin in Millimeters (mm)   150 Millimeters (mm)   Distance from Medial Margin in Millimeters (mm)   100 Millimeters (mm)   Distance from Lateral Margin in Millimeters (mm)   100 Millimeters (mm)   Distance from Closest Margin in Millimeters (mm)   8.8 Millimeters (mm)   Closest Margin   Anterior    DCIS Margins   Uninvolved by DCIS    Distance of DCIS to Anterior Margin in Millimeters (mm)   10 Millimeters (mm)   Distance of DCIS to Posterior Margin in Millimeters (mm)   35  Millimeters (mm)   Distance of DCIS to Superior Margin in Millimeters (mm)   90 Millimeters (mm)   Distance of DCIS to Inferior Margin in Millimeters (mm)   150 Millimeters (mm)   Distance of DCIS to Medial Margin in Millimeters (mm)   100 Millimeters (mm)   Distance of DCIS to Lateral Margin in Millimeters (mm)   100 Millimeters (mm)   Distance of DCIS from Closest Margin in Millimeters (mm)   10 Millimeters (mm)   Closest Margin   Anterior    LYMPH NODES   Regional Lymph Nodes   Uninvolved by tumor cells    Number of Lymph Nodes Examined   2    Number of [place] Nodes Examined   2    PATHOLOGIC STAGE CLASSIFICATION (pTNM, AJCC 8th Edition)   TNM Descriptors   Not applicable    Primary Tumor (Invasive Carcinoma) (pT)   pT1c    Regional Lymph Nodes (pN)       Modifier   (sn): Only sentinel node(s) evaluated.    Category (pN)   pN0    .      Comment     Immunostains for P63 were performed on blocks 3D and 3E utilizing appropriate controls.  Immunostains for CK5/6, estrogen receptors and progesterone receptors were performed on block 3D utilizing appropriate controls.  The invasive components lack an intact myoepithelial layer by P63 staining.  Rare foci suspicious for ductal carcinoma in situ (DCIS) showed intact myoepithelial layers by P63 staining, and lacked CK5/6 immunoreactivity. DCIS is strongly positive for Estrogen receptors (100%, Trevor 8) and Progesterone receptors (85%, Trevor 8).  The biopsy site changes grossly identified away from the primary mass lesion showed only stromal fibrosis, foreign body giant cell reaction and organizing fat necrosis consistent with previous biopsy site.  No in situ nor infiltrating carcinoma was identified associated with this more distant biopsy site.         [facility] panel 7-9-19 Breast and Gyn panel- VUS ion BARD1 c.2051A>C.     We will let her know no mutation found.    Procedures:      Assessment:   Diagnosis Plan   1. Carcinoma of upper-outer quadrant of left breast in  female, estrogen receptor positive (CMS/HCC)  Ambulatory Referral to Physical Therapy Bioimpedance (ROM LEFT shoulder)    Mammo Screening Modified With Tomosynthesis Right With CAD   2. Atypical ductal hyperplasia of left breast  Ambulatory Referral to Physical Therapy Bioimpedance (ROM LEFT shoulder)    Mammo Screening Modified With Tomosynthesis Right With CAD   3. Breast calcifications on mammogram     4. FH: breast cancer            1-3   LEFT breast biopsy sites on bedside ultrasound at the LEFT 12:00, 5 CFN location and at the LEFT 2:00, 4 CFN location.     At LEFT breast 12:00, 5 CFN location- Initially asymetry and 1.5 cm calcifications on mammo, 1.1 cm mass on ultrasound.  X2- MRI enhancement at the more posterior barrel measuring 1.5 cm  IMC, NST, low grade, 1,2,1, 9mm, associated low grade DCIS,   ER91, WA 94, her 2 artemio 1+, ki 67 is 5.7%.    2-LEFT breast 2:00, 4 CFN location- calcifications  4 cm inferior to above- tophat marker--single focus ADH, solid and cribiform, 1mm, usual hyerplasia, CCC, apocrine chagnes, FCC    - Note additional calcs int he same region (absent on contralateral breast )spanning 8 cm total.    Clinical stage T1cN0- IA   (possibly multifocal, with at least atypia spanning 5 cm from clip to clip)    Pathology from her LEFT TM and SLNB with reconstruction returned as:  Invasive mammary carcinoma, no special type, 1.2 cm, low-grade, 1, 2, 1, no lymphovascular invasion, duct carcinoma in situ, 8 mm, low-grade, margins clear.  Closest margin is 9 mm to the invasive.  The second biopsy site is commented that there are fibrosis and biopsy site changes with no residual atypia or invasive carcinoma.0/2 nodes.  Pathologic stage T1c N0 stage Ia    [facility] panel 7-15-19 - VUS in BARD1 c.2051 A>C    4-  PGA age uncertain      Plan:  Екатерина, her mother and I reviewed her interval history, pathology report and exam together today.    She has healed nicely.  She still has 1 drain in  place and sees Dr. Levine tomorrow for potential removal.  She has an appointment that we have made for her with Dr. Gooden on September 6.  She will not need radiation.  I will arrange for her to see physical therapy to do a bioimpedance measurement as well as range of motion for her left shoulder. She knows not to have this visit until Dr Levine oks it.  I will see her back in April 2020 [facility]  We will order that and see her back after.  I have asked her to continue her self breast exam and to call us in the interim with any concerns would be happy to see her back sooner.            Sarah Barker MD          Next Appointment:  Return for Next scheduled follow up, after imaging.      EMR Dragon/transcription disclaimer:    Much of this encounter note is an electronic transcription/translocation of spoken language to printed text.  The electronic translation of spoken language may permit erroneous, or at times, nonsensical words or phrases to be inadvertently transcribed.  Although I have reviewed the note from such areas, some may still exist.

## 2019-08-28 ENCOUNTER — TELEPHONE (OUTPATIENT)
Dept: SURGERY | Facility: CLINIC | Age: 49
End: 2019-08-28

## 2019-09-06 ENCOUNTER — CONSULT (OUTPATIENT)
Dept: ONCOLOGY | Facility: CLINIC | Age: 49
End: 2019-09-06

## 2019-09-06 ENCOUNTER — LAB (OUTPATIENT)
Dept: LAB | Facility: HOSPITAL | Age: 49
End: 2019-09-06

## 2019-09-06 VITALS
DIASTOLIC BLOOD PRESSURE: 63 MMHG | BODY MASS INDEX: 25.61 KG/M2 | SYSTOLIC BLOOD PRESSURE: 98 MMHG | TEMPERATURE: 99.2 F | HEIGHT: 68 IN | RESPIRATION RATE: 16 BRPM | WEIGHT: 169 LBS | OXYGEN SATURATION: 100 % | HEART RATE: 86 BPM

## 2019-09-06 DIAGNOSIS — C50.919 MALIGNANT NEOPLASM OF FEMALE BREAST, UNSPECIFIED ESTROGEN RECEPTOR STATUS, UNSPECIFIED LATERALITY, UNSPECIFIED SITE OF BREAST (HCC): Primary | ICD-10-CM

## 2019-09-06 DIAGNOSIS — C50.412 MALIGNANT NEOPLASM OF UPPER-OUTER QUADRANT OF LEFT BREAST IN FEMALE, ESTROGEN RECEPTOR POSITIVE (HCC): Primary | ICD-10-CM

## 2019-09-06 DIAGNOSIS — C50.412 MALIGNANT NEOPLASM OF UPPER-OUTER QUADRANT OF LEFT BREAST IN FEMALE, ESTROGEN RECEPTOR POSITIVE (HCC): ICD-10-CM

## 2019-09-06 DIAGNOSIS — Z17.0 MALIGNANT NEOPLASM OF UPPER-OUTER QUADRANT OF LEFT BREAST IN FEMALE, ESTROGEN RECEPTOR POSITIVE (HCC): ICD-10-CM

## 2019-09-06 DIAGNOSIS — Z17.0 MALIGNANT NEOPLASM OF UPPER-OUTER QUADRANT OF LEFT BREAST IN FEMALE, ESTROGEN RECEPTOR POSITIVE (HCC): Primary | ICD-10-CM

## 2019-09-06 LAB
CYTO UR: NORMAL
DEPRECATED RDW RBC AUTO: 41.7 FL (ref 37–54)
ERYTHROCYTE [DISTWIDTH] IN BLOOD BY AUTOMATED COUNT: 12.3 % (ref 12.3–15.4)
ESTRADIOL SERPL HS-MCNC: 69.1 PG/ML
FSH SERPL-ACNC: 4.62 MIU/ML
HCT VFR BLD AUTO: 43.5 % (ref 34–46.6)
HGB BLD-MCNC: 14.2 G/DL (ref 12–15.9)
LAB AP CASE REPORT: NORMAL
LAB AP DIAGNOSIS COMMENT: NORMAL
LAB AP SPECIAL STAINS: NORMAL
LAB AP SYNOPTIC CHECKLIST: NORMAL
LH SERPL-ACNC: 3.88 MIU/ML
Lab: NORMAL
Lab: NORMAL
MCH RBC QN AUTO: 30 PG (ref 26.6–33)
MCHC RBC AUTO-ENTMCNC: 32.6 G/DL (ref 31.5–35.7)
MCV RBC AUTO: 92 FL (ref 79–97)
PATH REPORT.ADDENDUM SPEC: NORMAL
PATH REPORT.FINAL DX SPEC: NORMAL
PATH REPORT.GROSS SPEC: NORMAL
PLATELET # BLD AUTO: 239 10*3/MM3 (ref 140–450)
PMV BLD AUTO: 10.4 FL (ref 6–12)
RBC # BLD AUTO: 4.73 10*6/MM3 (ref 3.77–5.28)
WBC NRBC COR # BLD: 7.66 10*3/MM3 (ref 3.4–10.8)

## 2019-09-06 PROCEDURE — 85027 COMPLETE CBC AUTOMATED: CPT | Performed by: INTERNAL MEDICINE

## 2019-09-06 PROCEDURE — 36415 COLL VENOUS BLD VENIPUNCTURE: CPT | Performed by: INTERNAL MEDICINE

## 2019-09-06 PROCEDURE — G0463 HOSPITAL OUTPT CLINIC VISIT: HCPCS | Performed by: INTERNAL MEDICINE

## 2019-09-06 PROCEDURE — 99245 OFF/OP CONSLTJ NEW/EST HI 55: CPT | Performed by: INTERNAL MEDICINE

## 2019-09-06 PROCEDURE — 83002 ASSAY OF GONADOTROPIN (LH): CPT | Performed by: INTERNAL MEDICINE

## 2019-09-06 PROCEDURE — 83001 ASSAY OF GONADOTROPIN (FSH): CPT | Performed by: INTERNAL MEDICINE

## 2019-09-06 PROCEDURE — 82670 ASSAY OF TOTAL ESTRADIOL: CPT | Performed by: INTERNAL MEDICINE

## 2019-09-06 RX ORDER — TAMOXIFEN CITRATE 20 MG/1
20 TABLET ORAL DAILY
Qty: 90 TABLET | Refills: 3 | Status: SHIPPED | OUTPATIENT
Start: 2019-09-06 | End: 2020-02-03 | Stop reason: SDUPTHER

## 2019-09-06 RX ORDER — LORATADINE 10 MG/1
10 TABLET ORAL DAILY
COMMUNITY
End: 2020-02-25

## 2019-09-06 NOTE — PROGRESS NOTES
Subjective     REASON FOR CONSULTATION: Left breast cancer T1cN 0- ER AK positive HER-2 negative low-grade infiltrating ductal carcinoma Oncotype score of 6 postmastectomy and sentinel node biopsy on 8/20/19  Provide an opinion on any further workup or treatment                             REQUESTING PHYSICIAN:  MD Dannielle Campa MD    RECORDS OBTAINED:  Records of the patients history including those obtained from the referring provider were reviewed and summarized in detail.    HISTORY OF PRESENT ILLNESS:  The patient is a 49 y.o. year old female who is here for an opinion about the above issue.    History of Present Illness patient is a 49-year-old -American female in excellent health on no chronic medications who was noted on her routine mammogram after 3-year break to have an abnormality in the left breast.  The patient thought she had some tenderness and showed this to her family doctor who sent her for a mammogram and this did show a definite difference from 3 years previously in that a12 mm abnormality was noted in her left breast at the 12:30 position.  Diagnostic mammogram showed in addition  that a to the lesion at 1230 there was another abnormality measuring 7 mm at the 1:00 region and several groups of indistinct calcifications were also noted in this area.  The ultrasound confirmed a solid mass measuring 11 x 10 at 1230 and a 4 x 4 millimeter mass at 4:00 in addition to 2 other cysts biopsy was done.  On 6/26/2019 with the lesion at 12:00 showing atypical ductal hyperplasia measuring 1 mm negative for invasive cancer in the lesion at 1230 showed invasive mammary carcinoma no special type low-grade measuring 9 mm associated with low-grade DCIS and microcalcifications tumor was 90% ER +94% AK positive HER-2 negative low Ki-67 of 5.7%  Patient underwent bilateral breast MRI on 7/8/2019 and this showed the biopsy-proven  mass at the 12 o'clock position measuring 1.5 cm residual mass in the 3 o'clock position marker from the ADH showed no suspicious residual enhancement.  There is no obvious adenopathy in the right breast was benign    Because of the multifocal nature of the disease the patient opted for a left mastectomy with immediate reconstruction and the final pathology specimen showed a 12 x 12 mm low-grade invasive ductal carcinoma with 2- sentinel nodes.  There was associated DCIS measuring 8 x 6 mm and margins were all clear    Patient is  4 para 2 1 miscarriage and one  first childbirth was at age 23 she did not breast-feed she was on birth control for 1 year and has not had appeared since  when she had a hysterectomy for adenomyosis.  She is probably premenopausal as she has had no symptoms of menopause    Family history is completely negative for breast cancer ovarian cancer or any other malignancy that she is aware of father  at 60 of a heart attack mother 73 she has one brother who is healthy.    She is here today to discuss adjuvant treatment her Oncotype score came back at 6 Which I told her was excellent Which predicts no benefit from chemotherapy and distant recurrence at 9 years of 3% with hormonal blockade  We discussed tamoxifen as she is likely premenopausal in the side effect profile  The side effects and toxicities of Tamoxifen were discussed with the patient, including hot flashes, mood swings,depression, DVT and endometrial cancer. A list of drugs that interfere with the efficacy of tamoxifen and are to be avoided were given to the patient.  Estradiol FSH and LH levels have been drawn today to ensure that she is premenopausal    Past Medical History:   Diagnosis Date   • Cancer (CMS/[facility]) 2019    LEFT BREAST   • Chronic female pelvic pain    • Fibrocystic breast    • History of palpitations    • PONV (postoperative nausea and vomiting)    • Seasonal allergies         Past Surgical  History:   Procedure Laterality Date   • BREAST BIOPSY     • BREAST RECONSTRUCTION Left 8/15/2019    Procedure: LEFT PLACEMENT TISSUE EXPANDER WITH ALLORDERM;  Surgeon: PATRICIO Levine MD;  Location: [facility];  Service: Plastics   • DILATATION AND CURETTAGE  1998   • DILATATION AND CURETTAGE  1991    Vaginal warts   • FOOT SURGERY Bilateral     BUNIONECTOMY-2005 AND 2015   • HYSTERECTOMY  08/2008   • MASTECTOMY W/ SENTINEL NODE BIOPSY Left 8/15/2019    Procedure: LEFt total mastectomy, LEFT axilla sentinel node biopsy,  immediate recosntruction;  Surgeon: Sarah Barker MD;  Location: [facility];  Service: General   • TIBIA FRACTURE SURGERY Right 1980   • WISDOM TOOTH EXTRACTION          Current Outpatient Medications on File Prior to Visit   Medication Sig Dispense Refill   • loratadine (CLARITIN) 10 MG tablet Take 10 mg by mouth Daily.     • Multiple Vitamin (MULTIVITAMIN) tablet Take 1 tablet by mouth Daily. LAST DOSE 8/5/19     • [DISCONTINUED] cyclobenzaprine (FLEXERIL) 10 MG tablet Take 1 tablet by mouth 3 (Three) Times a Day As Needed for Muscle Spasms.     • [DISCONTINUED] Desloratadine-Pseudoephedrine (CLARINEX-D 24 HOUR PO) Take  by mouth.     • [DISCONTINUED] doxycycline (VIBRAMYCIN) 100 MG capsule      • [DISCONTINUED] hydrOXYzine pamoate (VISTARIL) 50 MG capsule Take 1 capsule by mouth 4 (Four) Times a Day As Needed for Itching.     • [DISCONTINUED] mupirocin (BACTROBAN) 2 % ointment Apply  topically to the appropriate area as directed Daily.       No current facility-administered medications on file prior to visit.         ALLERGIES:    Allergies   Allergen Reactions   • Codeine Rash     As a child, does not recall the severity of the reaction        Social History     Socioeconomic History   • Marital status:      Spouse name: Not on file   • Number of children: Not on file   • Years of education: Not on file   • Highest education level: Not on file   Occupational History      "Employer: [employer]   Tobacco Use   • Smoking status: Never Smoker   • Smokeless tobacco: Never Used   Substance and Sexual Activity   • Alcohol use: Yes     Comment: 3 PER MONTH-SOCIALLY   • Drug use: No   • Sexual activity: Defer        Family History   Problem Relation Age of Onset   • Diabetes Mother    • Heart disease Mother    • Hypertension Mother    • Hyperlipidemia Mother    • Cancer Maternal Aunt         OF UNKNOWN ORGIN   • Diabetes Maternal Aunt    • Hyperlipidemia Maternal Aunt    • Breast cancer Paternal Aunt         PATERNAL GREAT AUNT-UNKNOLWN AGE    • Lung cancer Maternal Grandmother    • Brain cancer Maternal Grandmother    • Hypertension Father    • Heart attack Father    • Seizures Brother    • Malig Hyperthermia Neg Hx         Review of Systems   Constitutional: Negative.    Musculoskeletal: Negative.    All other systems reviewed and are negative.       Objective     Vitals:    09/06/19 1443   BP: 98/63   Pulse: 86   Resp: 16   Temp: 99.2 °F (37.3 °C)   SpO2: 100%   Weight: 76.7 kg (169 lb)   Height: 173 cm (68.11\")  Comment: new ht w/o shoes   PainSc: 0-No pain  Comment: stiffness from surgery     Current Status 9/6/2019   ECOG score 0       Physical Exam   GENERAL:  Well-developed, well-nourished in no acute distress.   SKIN:  Warm, dry without rashes, purpura or petechiae.  EYES:  Pupils equal, round and reactive to light.  EOMs intact.  Conjunctivae normal.  EARS:  Hearing intact.  NOSE:  Septum midline.  No excoriations or nasal discharge.  MOUTH:  Tongue is well-papillated; no stomatitis or ulcers.  Lips normal.  THROAT:  Oropharynx without lesions or exudates.  NECK:  Supple with good range of motion; no thyromegaly or masses, no JVD.  LYMPHATICS:  No cervical, supraclavicular, axillary or inguinal adenopathy.  CHEST:  Lungs clear to auscultation. Good airflow.  BREASTS: Right breast is benign left chest wall shows an expander in place  CARDIAC:  Regular rate and rhythm without murmurs, " rubs or gallops. Normal S1,S2.  ABDOMEN:  Soft, nontender with no hepatosplenomegaly or masses.  EXTREMITIES:  No clubbing, cyanosis or edema.  NEUROLOGICAL:  Cranial Nerves II-XII grossly intact.  No focal neurological deficits.  PSYCHIATRIC:  Normal affect and mood.        RECENT LABS:  Hematology WBC   Date Value Ref Range Status   09/06/2019 7.66 3.40 - 10.80 10*3/mm3 Final     RBC   Date Value Ref Range Status   09/06/2019 4.73 3.77 - 5.28 10*6/mm3 Final     Hemoglobin   Date Value Ref Range Status   09/06/2019 14.2 12.0 - 15.9 g/dL Final     Hematocrit   Date Value Ref Range Status   09/06/2019 43.5 34.0 - 46.6 % Final     Platelets   Date Value Ref Range Status   09/06/2019 239 140 - 450 10*3/mm3 Final            Pathology          Study Result     BILATERAL BREAST MRI WITHOUT AND WITH CONTRAST       IMPRESSION:  1. Biopsy-proven malignancy in the left breast at the 12 o'clock  position with the more posterior of the barrel-shaped metallic clip seen  centrally within the residual mass which measures on the order of 1.5 cm  in greatest dimension. The more anterior barrel-shaped metallic clips is  located within the hematoma. No other suspicious findings are seen  within the left breast and there is no evidence for left axillary  adenopathy.  2. The top hat shaped metallic clip at the 3 o'clock position  corresponds to the site of ADH and is not associated with any residual  suspicious enhancement. The metallic clip is seen within a dominant  hematoma cavity that measures on the order of 4 cm in greatest  dimension.  3. There are no findings suspicious for malignancy in the right breast.     BI-RADS Category 6: Known malignancy.     This report was finalized on 7/8/2019 11:51 AM by Dr. Harjinder Encinas M.D.             Assessment/Plan   1.  T1cN0 low-grade ER IL positive HER-2 negative infiltrating ductal carcinoma left breast with associated DCIS multifocal post mastectomy and sentinel node biopsy-Oncotype  score of 6  2.   negative 9 gene invitae panel testing    Plan  1.  Check FSH LH and estradiol to make sure she is premenopausal  2.  Tamoxifen 20 mg daily for at least 5 years with a switch to an AI when she is menopausal    Discussed in detail the side effects of tamoxifen and the information from the Oncotype DX score and she is very happy to get away without chemotherapy and agreeable to tamoxifen but she will call me next week to check her menopausal status before taking it.  No radiation is planned

## 2019-09-09 ENCOUNTER — TELEPHONE (OUTPATIENT)
Dept: SURGERY | Facility: CLINIC | Age: 49
End: 2019-09-09

## 2019-09-09 NOTE — TELEPHONE ENCOUNTER
Recurrence score returned as a distant risk of recurrence at 9 years on tamoxifen alone as 3% with a score of 6.

## 2019-09-16 ENCOUNTER — TELEPHONE (OUTPATIENT)
Dept: ONCOLOGY | Facility: HOSPITAL | Age: 49
End: 2019-09-16

## 2019-09-16 NOTE — TELEPHONE ENCOUNTER
Per Dr Gooden, pt is premenopausal and should start her tamoxifen.       ----- Message from Sujey Mahmood sent at 9/16/2019 11:35 AM EDT -----  [phone]   had called last week with questions for Dr. Gooden, doesn't know what to do next, But has not heard anything.

## 2019-09-23 ENCOUNTER — TELEPHONE (OUTPATIENT)
Dept: ONCOLOGY | Facility: HOSPITAL | Age: 49
End: 2019-09-23

## 2019-09-23 NOTE — TELEPHONE ENCOUNTER
----- Message from Sujey Mahmood sent at 9/23/2019  3:07 PM EDT -----  [phone]  Having issues with script at night and during the day.  Stated struggling to take Tamoxifen. If takes in am she has dizziness and when takes it in evening she can't wake up in the morning. She is going back to work next week and needs to have these issues resolved. Stated her plastic surgeon instructed her to call Dr Gooden and she about alternate therapies. Told her all hormonal therapy have side effects but would touch base with Dr Gooden. In basket message to Dr Gooden. Per Dr Gooden have patient cut pill in have and try that for a few days. If that doesn't help call us back. Patient verbalized understanding.

## 2019-09-24 ENCOUNTER — APPOINTMENT (OUTPATIENT)
Dept: PHYSICAL THERAPY | Facility: HOSPITAL | Age: 49
End: 2019-09-24

## 2019-11-11 ENCOUNTER — OFFICE VISIT (OUTPATIENT)
Dept: OTHER | Facility: HOSPITAL | Age: 49
End: 2019-11-11

## 2019-11-11 VITALS
WEIGHT: 174.4 LBS | OXYGEN SATURATION: 98 % | DIASTOLIC BLOOD PRESSURE: 74 MMHG | SYSTOLIC BLOOD PRESSURE: 113 MMHG | HEART RATE: 88 BPM | BODY MASS INDEX: 26.43 KG/M2 | RESPIRATION RATE: 18 BRPM | TEMPERATURE: 98.4 F

## 2019-11-11 DIAGNOSIS — C50.412 MALIGNANT NEOPLASM OF UPPER-OUTER QUADRANT OF LEFT BREAST IN FEMALE, ESTROGEN RECEPTOR POSITIVE (HCC): Primary | ICD-10-CM

## 2019-11-11 DIAGNOSIS — Z17.0 MALIGNANT NEOPLASM OF UPPER-OUTER QUADRANT OF LEFT BREAST IN FEMALE, ESTROGEN RECEPTOR POSITIVE (HCC): Primary | ICD-10-CM

## 2019-11-11 PROCEDURE — 99215 OFFICE O/P EST HI 40 MIN: CPT | Performed by: NURSE PRACTITIONER

## 2019-11-11 PROCEDURE — G0463 HOSPITAL OUTPT CLINIC VISIT: HCPCS | Performed by: NURSE PRACTITIONER

## 2019-11-11 NOTE — PROGRESS NOTES
[facility] MULTIDISCIPLINARY CLINIC  SURVIVORSHIP VISIT    Екатерина Strauss is a pleasant 49 y.o.  female being followed by Shannan Gooden MD  for T1cN0 low-grade ER LA positive HER-2 negative infiltrating ductal carcinoma left breast with associated DCIS multifocal post mastectomy and sentinel node biopsy-Oncotype score of 6. Here today in our Cancer Survivorship Clinic, to review survivorship care plan.    TREATMENT HISTORY:        Malignant neoplasm of upper-outer quadrant of left breast in female, estrogen receptor positive (CMS/HCC)    6/25/2019 Initial Diagnosis     Malignant neoplasm of upper-outer quadrant of left breast in female, estrogen receptor positive (CMS/HCC)         6/25/2019 Biopsy     1.  Left breast, 12-1:00, core biopsies:  Single focus of atypical ductal hyperplasia (ADH), solid and cribriform type, 0.1 cm in dimension.  Florid hyperplasia of usual type, columnar cell hyperplasia, apocrine change, and fibrocystic changes are also present.  Microcalcifications are present in fibroproliferative changes and in ADH.  Negative for invasive carcinoma.  Recuts are performed.    2.  Left breast, 1230, core biopsies:  Invasive mammary carcinoma, no special type (ductal), low-grade, measuring at least 0.9 cm in dimension with limited associated low-grade cribriform ductal carcinoma in situ (DCIS).  Microcalcifications are present within the invasive carcinoma and in adjacent benign breast parenchyma with fibrocystic changes.    Estrogen receptor: Positive at 90.78%  Progesterone receptor: Positive at 94.21%  HER2 status: Negative at 1+ by IHC  Ki 67: 5.7%           7/9/2019 Genetic Testing     Genetic testing negative via [facility] Breast Cancer STAT Panel, add-on KERRIE and CHEK2 genes         8/15/2019 Surgery     Left mastectomy and left axillary sentinel node biopsy per Sarah Barker MD.    Neatly followed by a reconstruction with tissue expanders/AlloDerm per NAREN  Isaias Levine MD    1. Left Breast [place] Lymph Node #1:               A. One lymph node negative for metastatic carcinoma (0/1).                 2. Left Breast [place] Lymph Node #2:               A. One lymph node negative for metastatic carcinoma (0/1).     3. Left Breast, Total Mastectomy:               A. Invasive ductal carcinoma, not otherwise specified.                            1. Invasive carcinoma measures 12 mm x 12 mm x 10 mm.                            2. Overall [place] grade I (glandular score = 1, nuclear score = 2, mitotic score = 1).                            3. No lymphovascular space invasion identified.               B. Associated ductal carcinoma in situ (DCIS).                            1. Focus of DCIS spans 8 mm x 6 mm x  6mm.                            2. Low nuclear grade cribriform DCIS without necrosis.                           3.  DCIS measures at least 10 mm from all inked surgical margins.                  C. Invasive carcinoma measures 8.8 mm from the closest (Anterior) margin of excision.                   All other margins measure at least 35 mm from invasive carcinoma including:                        Superior margin = 90 mm.                        Inferior margin = 150 mm.                        Posterior margin = 35 mm.                        Lateral margin = 100 mm.                        Medial margin = 100 mm.                  D. No pagetoid involvement of skin nor nipple identified.               E. Microcalcifications are identified within foci of invasive carcinoma, DCIS and benign breast tissue.               F. Nonneoplastic breast tissue with fibrocystic change, scattered adenosis, focal moderate to marked ductal                    hyperplasia of usual type and fibroadenomatous hyperplasia noted.               G. Gross and microscopic changes of three biopsy sites and clips noted.               H. Previously reported biomarkers: estrogen receptor: Positive  (91%), progesterone receptor: Positive (94%),                    HER-2/artemio: Negative, Ki-67 = 5.7% ([id number]) (not reviewed).     Pathologic Stage: pT1c (sn) N0 (G1)  See Synoptic for tumor details.  2 negative sentinel lymph nodes  Oncotype DX score of 6           9/6/2019 -  Hormonal Therapy     Tamoxifen 20 mg by mouth daily.  Plan for 5 years with a switch to in aromatase inhibitor when menopausal            Allergies as of 11/11/2019 - Reviewed 11/11/2019   Allergen Reaction Noted   • Codeine Rash 06/18/2012   • Percocet [oxycodone-acetaminophen] Itching 11/11/2019       MEDICATIONS:  I have reviewed the medication list with the patient and I updated it in the electronic medical record. Medication dosages and frequencies were confirmed to be accurate with the patient.      Review of Systems   Constitutional: Positive for fatigue. Negative for activity change, appetite change and unexpected weight change.   Respiratory: Negative for chest tightness and shortness of breath.    Cardiovascular: Negative for chest pain and leg swelling.   Gastrointestinal: Negative for blood in stool, constipation and diarrhea.   Genitourinary: Negative for hematuria.   Musculoskeletal: Negative for arthralgias and myalgias.        Left breast expander discomfort   Neurological: Negative for dizziness and light-headedness.   Psychiatric/Behavioral: Positive for sleep disturbance. Negative for decreased concentration and dysphoric mood. The patient is not nervous/anxious.        /74   Pulse 88   Temp 98.4 °F (36.9 °C) (Oral)   Resp 18   Wt 79.1 kg (174 lb 6.4 oz)   LMP  (LMP Unknown)   SpO2 98% Comment: room air  BMI 26.43 kg/m²     Wt Readings from Last 3 Encounters:   11/11/19 79.1 kg (174 lb 6.4 oz)   09/06/19 76.7 kg (169 lb)   08/27/19 76.8 kg (169 lb 6.4 oz)       Pain Score    11/11/19 1437   PainSc: 0-No pain         Physical Exam   Constitutional: She is oriented to person, place, and time. She appears  well-developed and well-nourished. She is cooperative.   HENT:   Head: Normocephalic and atraumatic.   Mouth/Throat: Oropharynx is clear and moist and mucous membranes are normal.   Cardiovascular: Normal rate and regular rhythm.   Pulmonary/Chest: Effort normal. No respiratory distress.   Abdominal: Soft. Normal appearance.   Musculoskeletal: Normal range of motion.   Neurological: She is alert and oriented to person, place, and time.   Skin: Skin is warm, dry and intact.   Psychiatric: She has a normal mood and affect. Her speech is normal and behavior is normal. Judgment and thought content normal. Cognition and memory are normal.   Vitals reviewed.        Problem List Items Addressed This Visit        Active Problems    Malignant neoplasm of upper-outer quadrant of left breast in female, estrogen receptor positive (CMS/HCC) - Primary    Relevant Orders    Ambulatory Referral to Lymphedema Clinic            SURVIVORSHIP DISCUSSION HELD TODAY:   Primary patient goal(s): wellness     Management of disease and treatment related effects:   Reports tolerating tamoxifen fairly well.  Initially having difficulties with dizziness and lightheadedness at 20 mg/day dose.  She states she spoke with [facility] office and she has been cutting her pills in half taking 10 mg a day and since changing dose has had no further difficulties with dizziness.  She denies hot flashes above and beyond baseline.  We reviewed 1 to 2% risk of clotting event and signs and symptoms requiring prompt attention.  We also discussed strategies for preservation of bone health including weightbearing exercise to include moderate intensity walking most days of the week, continued avoidance of all tobacco and consideration of calcium and vitamin D supplements.    She does report some concerns regarding risk of developing cancer in her contralateral breast as she has had a history of breast calcifications since beginning mammography with frequent callbacks.   We discussed recommended surveillance continues to be annual mammography with annual clinical breast exam and routine breast self awareness and that this coupled with endocrine therapy represents best evidence for surveillance and she seems somewhat reassured by this.    She is looking forward to having her permanent implant placed on December 18 as she is generally uncomfortable with the expander in place particularly when it comes to sleep.  She had discussed having an evaluation with the breast care clinic and Dr. Barker had referred her however patient did not end up making an appointment at that time.  She does tell me today she is having some challenges with things like shaving in that left axilla and putting on deodorant and we did review information regarding lymphedema causes early signs and symptoms, prevention and treatment and strongly encouraged her to consider at least a in evaluative visit if she is in agreement so I will rerefer her today for that    Psychosocial and spiritual:   · Distress score: 2  · GAD7: 1 - minimal anxiety  · PHQ9: 1 - minimal depression  Overall reports good support from mom, 2 adult children, and the network of supportive friends.  Some concerns regarding body image and intimate relationships and we briefly discussed these and I provided her with a copy of sexuality and the woman with cancer.  We discussed additional sources of support available through [employer] and [place] [employer] against cancer.         Advance Care Planning   Advance Care Planning Discussion:    Patient does not have advance care planning complete. Brief discussion and written information provided regarding advance care planning and appropriateness for all healthy adults, choosing a healthcare surrogate and upcoming Advance Care Planning classes offered [facility].         Maintaining personal wellness: We discussed current BMI of 26 and recommended target of  20-25 for wellness, cardiovascular health and risk reduction for cancer recurrence and prevention. We discussed availability of oncology registered dietician, Ana Paula Banks and referral offered. Patient declines at this time. Ana Paula's contact info and handout describing recommended dietary modifications emphasizing whole foods, plant based diet provided.        She has been going to the gym routinely to use the treadmill for walking, has been using lowest weight free weights for range of motion exercises and is looking forward to getting back to her regular yoga routine.  Encouraged her to continue these excellent behaviors to help manage fatigue, brain fog, and improve sleep quality.    With regard to colon cancer screening she states she was set up for a colonoscopy prior to her diagnosis but that was put on hold.  I have encouraged her to reconnect with her primary care to get her screening colonoscopy scheduled.  She did have  Her uterus removed in August 2008 she states her ovaries and cervix remain intact and she continues to have cervical cancer screening and that is up-to-date.            Discussed NCCN recommendations for all cancer survivors of 150 minutes/week moderate intensity exercise, achieve and maintain a healthy BMI, plants-based whole-foods diet, avoid tobacco and second hand smoke, avoid alcohol or minimize alcohol intake - no more than 1 drink in a day for women, 2 drinks in a day for men.     Orders Placed This Encounter   Procedures   • Ambulatory Referral to Lymphedema Clinic     Referral Priority:   Routine     Referral Type:   Therapy     Number of Visits Requested:   1       After review of the Survivorship Treatment Summary & Care Plan, the patient verbalized understanding of recommendations for follow-up. As outlined in the care plan, they were advised to continue with follow-up care in accordance with the NCCN surveillance guidelines while transitioning back to their primary care  physician for continued general preventive and healthcare needs. We discussed the importance of healthy eating, exercise and weight management. We reviewed current guidelines for routine screening of other cancers.     A copy of the Survivorship Treatment Summary & Care Plan for Ms. Strauss (see below) was provided to and forwarded to the providers identified on the care team.      Greater than 40 minutes spent with patient, more than half of that spent face-to-face counseling patient extensively on treatment summary, surveillance for recurrence, late and long-term effects of disease and treatment, intimacy and self-image, preventative screening for other cancers, achieving/maintaining healthy BMI and link to risk reduction, adherence to current therapies, management of anxiety/uncertainty and self care strategies.         Breast Cancer Survivorship Plan      General Information   Patient name Екатерина Strauss   Date of birth 1970    Phone Home Phone [phone]      Email No e-mail address on record      Cancer Treatment Team     Patient Care Team:  Sarah Barker MD as Referring Physician (Breast Surgery)  Shannan Gooden MD as Consulting Physician (Hematology and Oncology)  PATRICIO Levine MD as Consulting Physician (Plastic Surgery)    Provider Phone numbers  Care Team Provider: Provider, No Known  Care Team Provider: Sarah Barker MD,  ([phone])  Care Team Provider: Shannan Gooden MD,  ([phone])  Care Team Provider: PATRICIO Levine MD,  ([phone])     Post Treatment Care Team   Primary Care Physician Tabby Velarde MD  [address]  [place] [address]   [phone]         Background Information   Medical history Past Medical History:   • Cancer (CMS/HCC)    LEFT BREAST   • Chronic female pelvic pain   • Fibrocystic breast   • History of palpitations   • PONV (postoperative nausea and vomiting)   • Seasonal allergies      Surgical history Past  Surgical History:   • BREAST BIOPSY   • BREAST RECONSTRUCTION    Procedure: LEFT PLACEMENT TISSUE EXPANDER WITH ALLORDERM;  Surgeon: PATRICIO Levine MD;  Location: [facility];  Service: Plastics   • DILATATION AND CURETTAGE   • DILATATION AND CURETTAGE    Vaginal warts   • FOOT SURGERY    BUNIONECTOMY-2005 AND 2015   • HYSTERECTOMY   • MASTECTOMY W/ SENTINEL NODE BIOPSY    Procedure: LEFt total mastectomy, LEFT axilla sentinel node biopsy,  immediate recosntruction;  Surgeon: Sarah Barker MD;  Location: [facility];  Service: General   • TIBIA FRACTURE SURGERY   • WISDOM TOOTH EXTRACTION      Tobacco use Social History     Tobacco Use   Smoking Status Never Smoker   Smokeless Tobacco Never Used      Family oncology history Cancer-related family history includes Brain cancer in her maternal grandmother; Breast cancer in her paternal aunt; Cancer in her maternal aunt; Lung cancer in her maternal grandmother.      Oncology Information      Malignant neoplasm of upper-outer quadrant of left breast in female, estrogen receptor positive (CMS/HCC)    6/25/2019 Initial Diagnosis     Malignant neoplasm of upper-outer quadrant of left breast in female, estrogen receptor positive (CMS/HCC)           6/25/2019 Biopsy     1.  Left breast, 12-1:00, core biopsies:  Single focus of atypical ductal hyperplasia (ADH), solid and cribriform type, 0.1 cm in dimension.  Florid hyperplasia of usual type, columnar cell hyperplasia, apocrine change, and fibrocystic changes are also present.  Microcalcifications are present in fibroproliferative changes and in ADH.  Negative for invasive carcinoma.  Recuts are performed.    2.  Left breast, 1230, core biopsies:  Invasive mammary carcinoma, no special type (ductal), low-grade, measuring at least 0.9 cm in dimension with limited associated low-grade cribriform ductal carcinoma in situ (DCIS).  Microcalcifications are present within the invasive carcinoma and in adjacent benign  breast parenchyma with fibrocystic changes.    Estrogen receptor: Positive at 90.78%  Progesterone receptor: Positive at 94.21%  HER2 status: Negative at 1+ by IHC  Ki 67: 5.7%             7/9/2019 Genetic Testing     Genetic testing negative via [facility] Breast Cancer STAT Panel, add-on KERRIE and CHEK2 genes           8/15/2019 Surgery     Left mastectomy and left axillary sentinel node biopsy per Sarah Barker MD.    Neatly followed by a reconstruction with tissue expanders/AlloDerm per NAREN Levine MD    1. Left Breast [place] Lymph Node #1:               A. One lymph node negative for metastatic carcinoma (0/1).                 2. Left Breast [place] Lymph Node #2:               A. One lymph node negative for metastatic carcinoma (0/1).     3. Left Breast, Total Mastectomy:               A. Invasive ductal carcinoma, not otherwise specified.                            1. Invasive carcinoma measures 12 mm x 12 mm x 10 mm.                            2. Overall [place] grade I (glandular score = 1, nuclear score = 2, mitotic score = 1).                            3. No lymphovascular space invasion identified.               B. Associated ductal carcinoma in situ (DCIS).                            1. Focus of DCIS spans 8 mm x 6 mm x  6mm.                            2. Low nuclear grade cribriform DCIS without necrosis.                           3.  DCIS measures at least 10 mm from all inked surgical margins.                  C. Invasive carcinoma measures 8.8 mm from the closest (Anterior) margin of excision.                   All other margins measure at least 35 mm from invasive carcinoma including:                        Superior margin = 90 mm.                        Inferior margin = 150 mm.                        Posterior margin = 35 mm.                        Lateral margin = 100 mm.                        Medial margin = 100 mm.                  D. No pagetoid involvement of skin nor nipple  identified.               E. Microcalcifications are identified within foci of invasive carcinoma, DCIS and benign breast tissue.               F. Nonneoplastic breast tissue with fibrocystic change, scattered adenosis, focal moderate to marked ductal                    hyperplasia of usual type and fibroadenomatous hyperplasia noted.               G. Gross and microscopic changes of three biopsy sites and clips noted.               H. Previously reported biomarkers: estrogen receptor: Positive (91%), progesterone receptor: Positive (94%),                    HER-2/artemio: Negative, Ki-67 = 5.7% ([id number]) (not reviewed).     Pathologic Stage: pT1c (sn) N0 (G1)  See Synoptic for tumor details.  2 negative sentinel lymph nodes  Oncotype DX score of 6             9/6/2019 -  Hormonal Therapy     Tamoxifen 20 mg by mouth daily.  Plan for 5 years with a switch to in aromatase inhibitor when menopausal                Complications during Therapy:   No concerns stated   Modification to Treatment Plan:  No Modifications      Lifetime Dose Tracking:   No doses have been documented on this patient for the following tracked chemicals: Doxorubicin, Epirubicin, Idarubicin, Daunorubicin, Mitoxantrone, Bleomycin, Mitomycin, Doxorubicin Liposomal             [No treatment plan]         Persistent Treatment-Associated Adverse Effects at Completion of Therapy   It is important to recognize that not every person experiences the following adverse events after treatment.  You may not have any of these issues, a few or many adverse effects.  Experiences are highly variable.  Please discuss any adverse effects of cancer treatment with your cancer care team.      After Surgical Therapy   Mastectomy with Reconstruction     Mastectomy involves the removal of the entire breast. Breast reconstruction surgery creates a breast shape using implants or your own body tissue. Breast reconstruction can be done at the same time as mastectomy (immediate  reconstruction) or at a later time (delayed reconstruction). After surgery, there may be pain and soreness in the chest, underarm, shoulder and in any area from which tissue was taken for breast reconstruction. Those sensations should get better over time. Nerves may be damaged in areas where any surgery was performed which may result in numbness and other changes in sensation. Care and attention to wound healing after surgery is important, especially with tissue flap surgery reconstruction which may cause more delayed healing and increased risk of tissue death (necrosis). Healing can be affected by things like smoking, diabetes and other factors. Sometimes, more surgery and special wound care may be required. Talk with your doctor or nurse if you have issues with pain, signs of infection or non-healing wounds, numbness or tingling, or any changes in function or mobility.      How you think and feel about your body is important and coping with changes after mastectomy and reconstruction takes time.  A reconstructed breast will not look or feel the same as your natural breast and the result may look different than expected. It’s important to look at your scars and your reconstructed breast. It’s important to touch your scar and reconstructed breast, too.  Your physician may give you instructions about massaging the scar to help with healing and to soften scar tissue. If you have a partner, let your partner look at your chest and feel the scar when you’re ready.  Working through feelings about the cancer and changes as a result of surgery may take time and support. Talk with your doctor or nurse about any issues with body image and coping.     Survivors of breast cancer should speak with their health care provider regarding the possibility of a genetic or family syndrome. If there does appear to be a family history or possible genetic syndrome, genetic counseling and testing may be recommended.      [place] Node  Biopsy   Removal of the sentinel lymph node is the removal of the first lymph node to which cancer cells are most likely to spread. After surgery, there may be pain and soreness in the area where the node was removed.  Nerves may be damaged which may result in numbness or other changes in sensation. While sentinel node biopsy (as opposed to a lymph node dissection where more lymph nodes are removed) decreases the risk of developing lymphedema, the risk is not completely gone.     Lymphedema   Removal of lymph nodes can slow the normal flow of lymph in the area which can lead to swelling in that limb, also called lymphedema.  Survivors who also received radiation therapy to the area where a lymph node was removed may be at increased risk of developing lymphedema. In some cases, the lymphedema can occur years after cancer therapy was completed. Lymphedema can cause pain or discomfort, disfigurement, change in function, and increased risk of infection in the affected area and closest limb. Signs of lymphedema can include a feeling of fullness or heaviness, changes in the skin (red, thick, stiff), aching, tightness, and difficulty moving or flexing nearby joints.  Other signs could be that your jewelry or clothes, like socks, pants or sleeves, may begin to feel tight on the affected limb. As signs of lymphedema could develop months or years after treatment, continue to monitor for signs and notify your doctor.      Several steps can be taken to help prevent and control lymphedema. Survivors should protect the potentially affected limb by avoiding cuts, scrapes, burns, insect bites, shots/vaccines, blood draws, and IV sticks in order to decrease the risk of developing an infection in the limb. In addition, the survivor should protect the limb from the sun to avoid sunburn.  Lastly, survivors should avoid tight clothing and jewelry, and blood pressures in the affected limb that might further slow the normal flow of lymph.       Survivors of cancer, including those at risk for lymphedema, can and should exercise. Survivors should start slow and gradually increase intensity while monitoring your limb for changes in swelling or redness. If either occurs, stop exercising and notify your doctor for further direction.            After Chemotherapy   No chemotherapy received.      After Radiation Therapy   No radiation treatments received.      Hormone Therapy    Hormones, like estrogen and progesterone, are naturally produced in the body. Typically these hormones help our body function in various ways, however, in some cases these hormones can also cause cancer to grow.  When your cancer is positive for hormone receptors, your doctor may recommend hormone therapy. Hormone therapy works by either blocking the effects of the hormone on the cancer cell, or by reducing the amount of hormone produced by the body.  In doing so, the cancer cells no longer receive or use the hormone to grow.      It is very important for you to take the medicine as prescribed by your doctor and keep your regular follow-up appointments. Some medications interfere with hormone therapy medications, so be sure and discuss all medications that you take with your doctor.  Common side effects women experience from hormone therapy includes hot flashes, vaginal dryness, and lack of a period in women who have not yet reached menopause.  Some less common but serious side effects of hormone therapy may include increased risk for blood clots, joint pain, bone loss, weakness, mood changes, nausea, fatigue, and loss of interest in sexual activity, endometrial and uterine cancers, risk for stroke, heart attack, angina, and heart failure. Share with your doctor the side effects you experience so a plan can be made to minimize the effects. In addition, these medications will be taken long-term, in some cases 5-10 years. When taking oral hormone therapy, it may be helpful to create a  calendar, use a pillbox, or set an alarm on your watch or phone to remind you daily to take the medication.      Care of your Venous Access Device   No Venous Access Device currently in place      General After Cancer Treatment        It is not uncommon for cancer to impact other areas of your life such as relationships, work and mental health.  If you develop financial concerns, resources are sometimes available to assist in these areas.  Depression and anxiety can present either during or after cancer diagnosis and treatment.  It is important to discuss with your physician any of these concerns so these resources can be made available to you.      General Cancer Support & Resources    [facility]    Cancer Resource Center & Multidisciplinary Clinic:  [address] [place] [address]  [address]  [place] [address]  [phone]    Survivorship Clinical Nurse Specialist  Ayleen Vinson [facility] - [phone]    Oncology Social Worker:  Callie Flores [facility] - [phone]     Psychiatric Nurse Practitioner:  Charleen Fox [facility] - [phone]    Financial Counselor and Contact Information:  [facility] - [phone]    Oncology Dietician Contact Information:  Ana Paula Banks  - [phone]     Common Concerns After Treatment    Managing Anxiety or Depression:  Referral to support group, e.g. American Cancer Society (www.cancer.org, 504.377.3530), Cancer Support Community (www.wellnessandcancer.org, 203.572.5311)    Referral to a community provider for cognitive behavioral therapy or counseling.    Psychiatric care or counseling and/or initiation of pharmacotherapy are available at the [facility]osocial Support[facility] Oncology . Please call [phone] or talk to a member of your treatment team about a referral.    For anonymous information, resource requests or support you can also call the 24-hour Crisis and Information Center at [phone]      Fear of Cancer Coming  Back:  Patients need to know that these feelings are normal, and they won’t cause the cancer to come back.    • Referral for cognitive behavioral therapy or counseling    • Referral to support group, e.g. American Cancer Society (www.cancer.org, 925.408.4706), Cancer Support Community (www.wellnessandcancer.org, 635.594.7303)      Fatigue:  Fatigue is one of the most common problems in patients with cancer. It may be related to the disease itself or cancer treatment and may continue beyond completion of treatment among long-term cancer survivors. Among people with cancer, 80% to 100% report fatigue.     Fatigue may be an isolated problem or occur as one part of a cluster of symptoms, such as pain, depression, dyspnea, anorexia, and sleep problems.    If you are taking an immunotherapy, fatigue may be a symptom of an endocrine disorder secondary to immunotherapy, such as thyroid or pituitary disorder, not just general cancer-related fatigue. It is important to report any changes to your doctor (ONS, 2017)    Regular physical activity (goal of 150 minutes of activity per week) is the most important thing you can do to manage fatigue. Other treatments that are likely to be helpful include:    · Progressive muscle relaxation and/or relaxation breathing with or without imagery or distraction  · Cognitive behavioral therapy for sleep  · Yoga  · Meditation, mindfulness based stress reduction, and cognitive behavioral stress management  · Cognitive behavioral therapy for fatigue, depression, and pain with or without hypnosis  · Evaluation for other causes of fatigue including hypothyroidism, anemia and depression      Financial Concerns:  The financial challenges that people with cancer and their families face are very real.     For hospital bills, you may want to talk with a hospital financial counselor. You may be able to work out a monthly payment plan or even get a reduced rate. You may also want to stay in touch with  your insurance company to make sure costs are covered.    For information about resources that are available you can go to the NCI database, “Organizations That Offer Support Services,” at http://www.cancer.gov, search terms “financial assistance.”     Or call the [facility] toll-free 9-671-6-CANCER (1-986.786.5217) to ask for help.      Managing Hot Flashes:  · Avoiding sugar may reduce night sweats  · New research studies suggest acupuncture may be helpful in control of hot flashes  · Regular meditation calms the adrenal glands and may reduce hot flashes and night sweats  · Ask your doctor about appropriate medical treatments. Medicines that may help manage hot flashes include Venlafaxine (Effexor) 37.5mg up to 75 mg daily OR Gabapentin (Neurontin) 300mg at bedtime up to 3 times/day.         Managing Insomnia  · Mindfulness based stress reduction  · Practice good sleep hygiene (reduce/eliminate TV and electronic device screen time one hour before bed)  · Avoid stimulants like caffeine and nicotine close to bedtime  · Avoid alcohol close to bedtime. While alcohol is well-known to help you fall asleep faster, too much close to bedtime can disrupt sleep in the second half of the night as the body begins to process the alcohol.     · Evaluation and management of other symptoms (hot flashes, anxiety, and pain)  · Regular physical activity (goal of 150 minutes of activity per week)  · The supplement melatonin may help with sleep disruption    More information at www.sleepfoundation.org      Concerns about Sexuality, Intimacy and Body Image  · Ask your treatment team about referrals to counseling and/or support groups to address body image concerns.  · Non-hormonal remedies for vaginal dryness for sexual activity include water-based vaginal lubricants (Astroglide, [place] Jelly). Avoid products with spermicides or additives as they can be irritating.  · Some people use a non-hormonal vaginal moisturizer 1-3 times per week to improve  "general comfort (Replens, coconut oil).  · Discuss pain with intercourse or sexual activity with your doctor.    Look Good Feel Better is a non-medical, brand-neutral public service program that teaches beauty techniques to people with cancer to help them manage the appearance-related side effects of cancer treatment. The program includes lessons on skin and nail care, cosmetics, wigs and turbans, accessories and styling, helping people with cancer to find some normalcy in a life that is by no means normal.    For more information and helpful videos visit:  http://lookgoodfeelbetter.org/       About Lymphedema:  According to the National Cancer Muse, anywhere from 5-17% of women who have sentinel lymph node biopsy develops lymphedema. Among women who have axillary lymph node dissection, the percentage is higher - from 20-53% - and risk increases with the number of nodes taken out.     · Referral to lymphedema physical therapy specialist for lymphatic massage or proper fitting of compression garments or bandages  · Weight training and regular exercise  · Achieve and maintain a healthy weight    Tips to reduce the risk of injury or infection to the arm:  · Treat infections of the at-risk arm and hand right away.  · Wear gloves when doing house or garden work.  · Keep skin clean and well-moisturized.  · Use the arm not at risk when having blood drawn, getting injections or having blood pressure taken.  · Avoid sunburn and excess heat from saunas, hot baths, tanning and other sources.  · Don't cut nail cuticles.  · Use insect repellant when outdoors.  · Avoid injuries, including scratches and bruises, to the at-risk arm.  · Rest the at-risk arm in an elevated position (above the heart or shoulder).     If you have an infection, injury or any of the symptoms listed above, see your health care provider.        Changes in Memory or \"Brain Fog\"  Patients should know that 25% of cancer patients have cognitive " "dysfunction (changes in thinking or memory) after treatment and it usually gets better over time    Some things you can do to manage \"brain fog\" or memory problems include:  · Mental exercises (e.g., word games, crossword puzzles)   · Setting up and following routines  · Repeating information  · Keeping a memory journal  · Avoiding multitasking  · Exercising  · Maintaining a healthy diet.   · There is some evidence that Group Cognitive Training is also effective  · Rule out depression, sleep disturbance      Tamoxifen  Possible side effects of Tamoxifen include clotting events which could include strokes, heart attacks, pulmonary emboli, DVT, as well as uterine cancer, mood swings, hot flashes, depression.    Contact prescriber immediately if you experience pain, swelling, or tenderness in your legs or calves or if you have unexpected shortness of  breath, sudden chest pain, coughing up blood, new breast lumps, vaginal bleeding, menstrual irregularities, changes in vaginal discharge, pelvic pain or pressure, or vision changes.     Please do not stop taking Tamoxifen without first speaking to your medical oncologist.    Avoid drugs or foods that interfere with efficacy of Tamoxifen.     Potent inhibitors of CYP2D2 which may result in lower levels of Tamoxifen include:  · antidepressants that act as selective serotonin reuptake inhibitors (SSRIs) or selective noradrenergic reuptake inhibitors (SNRIs) -- paroxetine, fluoxetine, bupropion, and duloxetine  · antipsychotics -- thioridazine, perphenazine, and pimozide  · cardiac drugs -- quinidine and ticlopidine  · medications for infectious disease -- terbinafine and quinidine  · miscellaneous medication -- cinacalcet.      Maintain a Healthy Weight:  Aim for a target BMI of 20-25 m2  (Body mass index is 25.61 kg/m².)    An oncology specialized registered dietician is available at the [facility] for consultations to you. Please call " [phone]      Prevention and Wellness:  · Achieve and maintain a healthy body weight as weight gain is a risk factor for development or recurrence of some cancers (BMI between 20-25m2).  · Current Body mass index is 25.61 kg/m².  · Regular physical activity (preferably daily). Strive for at least 150 minutes of moderate or 75 minutes of vigorous activity per week.  · Maintain a diet high in vegetables, fruits and whole grains and low in sugars and fats. Limit red meat and avoid processed foods.  · Avoid all tobacco and second hand smoke.  · Avoid all alcohol intake.  · If you do drink, minimize alcohol exposure - no more than one drink in a day for women and two drinks in a day for men.  · Continue all standard non-cancer related healthcare with your primary care provider. Any new, unusual, and/or persistent symptoms should be brought to the attention of your provider.            Surveillance    How Frequent?    Medical Oncology visits 1 - 4 times per year as clinically appropriate for 5 years, then annually      Lab tests As directed    Imaging exams      Mammography Annual clinical breast exam needed    Schedule a mammogram one year after the first mammogram that led to diagnosis, but no earlier than six months after radiation therapy. Obtain a mammogram every six to 12 months thereafter.    Women who have had a mastectomy (including simple mastectomy, modified radical mastectomy, and radical mastectomy) to treat breast cancer need no further routine screening mammograms on the affected side.     If both breasts are removed, they don’t need mammograms at all. There isn’t enough tissue remaining after these kinds of mastectomies to do a mammogram. Cancer can come back in the skin or chest wall on that side, but it can be found on a physical exam.      Lymphedema Monitor for lymphedema    Genetic Counseling Periodic screening for changes in family history and referral to genetic counseling as indicated     Gynecologic assessment For women on tamoxifen, gynecologic assessment every 12 months if uterus is present.    Pap smear: Beginning at age 30, every three years if the last three PAP tests in a row were normal. Every year if the last PAP was abnormal. every 3 years as long as the last 3 were normal, every year if abnormal.     Women age 70 and older who have had 3 or more normal PAP tests in a row, and no abnormal PAP tests results in the last 10 years, may choose to stop having PAP tests.    Pelvic exam:  Continue to visit a gynecologist annually.      Bone Density study For women on aromatase inhibitor or who experience ovarian failure secondary to treatment should have monitoring of bone health and bone mineral density determination at baseline and periodically thereafter    Bone densitometry (DEXA scan) every 1 to 2 years after initiation of aromatase inhibitor (if applicable) and/or at age 65 or older.    Calcium and Vitamin D.    Weight-bearing exercise most days of the week e.g. moderate intensity walking (can have a conversation but cannot sing).    Avoid all smoke and second hand smoke.     Bisphosphonate, if indicated by your doctor based on bone densitometry (DEXA scan) results.      Immunizations       Influenza       Herpes Zoster       Pneumococcal Yearly  Once  As appropriate    Tobacco Cessation The patient is not currently a tobacco user.  Counseling given: Not Answered

## 2019-12-03 ENCOUNTER — OFFICE VISIT (OUTPATIENT)
Dept: ONCOLOGY | Facility: CLINIC | Age: 49
End: 2019-12-03

## 2019-12-03 ENCOUNTER — LAB (OUTPATIENT)
Dept: LAB | Facility: HOSPITAL | Age: 49
End: 2019-12-03

## 2019-12-03 VITALS
BODY MASS INDEX: 26.27 KG/M2 | RESPIRATION RATE: 16 BRPM | HEART RATE: 88 BPM | WEIGHT: 173.3 LBS | SYSTOLIC BLOOD PRESSURE: 109 MMHG | TEMPERATURE: 98.6 F | DIASTOLIC BLOOD PRESSURE: 65 MMHG | HEIGHT: 68 IN | OXYGEN SATURATION: 97 %

## 2019-12-03 DIAGNOSIS — Z17.0 MALIGNANT NEOPLASM OF UPPER-OUTER QUADRANT OF LEFT BREAST IN FEMALE, ESTROGEN RECEPTOR POSITIVE (HCC): Primary | ICD-10-CM

## 2019-12-03 DIAGNOSIS — Z17.0 MALIGNANT NEOPLASM OF UPPER-OUTER QUADRANT OF LEFT BREAST IN FEMALE, ESTROGEN RECEPTOR POSITIVE (HCC): ICD-10-CM

## 2019-12-03 DIAGNOSIS — C50.412 MALIGNANT NEOPLASM OF UPPER-OUTER QUADRANT OF LEFT BREAST IN FEMALE, ESTROGEN RECEPTOR POSITIVE (HCC): Primary | ICD-10-CM

## 2019-12-03 DIAGNOSIS — C50.412 MALIGNANT NEOPLASM OF UPPER-OUTER QUADRANT OF LEFT BREAST IN FEMALE, ESTROGEN RECEPTOR POSITIVE (HCC): ICD-10-CM

## 2019-12-03 LAB
ALBUMIN SERPL-MCNC: 3.9 G/DL (ref 3.5–5.2)
ALBUMIN/GLOB SERPL: 1.2 G/DL (ref 1.1–2.4)
ALP SERPL-CCNC: 49 U/L (ref 38–116)
ALT SERPL W P-5'-P-CCNC: 7 U/L (ref 0–33)
ANION GAP SERPL CALCULATED.3IONS-SCNC: 11.3 MMOL/L (ref 5–15)
AST SERPL-CCNC: 12 U/L (ref 0–32)
BASOPHILS # BLD AUTO: 0.04 10*3/MM3 (ref 0–0.2)
BASOPHILS NFR BLD AUTO: 0.5 % (ref 0–1.5)
BILIRUB SERPL-MCNC: 0.3 MG/DL (ref 0.2–1.2)
BUN BLD-MCNC: 10 MG/DL (ref 6–20)
BUN/CREAT SERPL: 12.2 (ref 7.3–30)
CALCIUM SPEC-SCNC: 9.1 MG/DL (ref 8.5–10.2)
CHLORIDE SERPL-SCNC: 103 MMOL/L (ref 98–107)
CO2 SERPL-SCNC: 26.7 MMOL/L (ref 22–29)
CREAT BLD-MCNC: 0.82 MG/DL (ref 0.6–1.1)
DEPRECATED RDW RBC AUTO: 45.6 FL (ref 37–54)
EOSINOPHIL # BLD AUTO: 0.03 10*3/MM3 (ref 0–0.4)
EOSINOPHIL NFR BLD AUTO: 0.4 % (ref 0.3–6.2)
ERYTHROCYTE [DISTWIDTH] IN BLOOD BY AUTOMATED COUNT: 13.3 % (ref 12.3–15.4)
GFR SERPL CREATININE-BSD FRML MDRD: 90 ML/MIN/1.73
GLOBULIN UR ELPH-MCNC: 3.3 GM/DL (ref 1.8–3.5)
GLUCOSE BLD-MCNC: 91 MG/DL (ref 74–124)
HCT VFR BLD AUTO: 42.8 % (ref 34–46.6)
HGB BLD-MCNC: 13.5 G/DL (ref 12–15.9)
IMM GRANULOCYTES # BLD AUTO: 0.03 10*3/MM3 (ref 0–0.05)
IMM GRANULOCYTES NFR BLD AUTO: 0.4 % (ref 0–0.5)
LYMPHOCYTES # BLD AUTO: 2.25 10*3/MM3 (ref 0.7–3.1)
LYMPHOCYTES NFR BLD AUTO: 29.4 % (ref 19.6–45.3)
MCH RBC QN AUTO: 29.3 PG (ref 26.6–33)
MCHC RBC AUTO-ENTMCNC: 31.5 G/DL (ref 31.5–35.7)
MCV RBC AUTO: 92.8 FL (ref 79–97)
MONOCYTES # BLD AUTO: 0.53 10*3/MM3 (ref 0.1–0.9)
MONOCYTES NFR BLD AUTO: 6.9 % (ref 5–12)
NEUTROPHILS # BLD AUTO: 4.78 10*3/MM3 (ref 1.7–7)
NEUTROPHILS NFR BLD AUTO: 62.4 % (ref 42.7–76)
NRBC BLD AUTO-RTO: 0 /100 WBC (ref 0–0.2)
PLATELET # BLD AUTO: 236 10*3/MM3 (ref 140–450)
PMV BLD AUTO: 10.3 FL (ref 6–12)
POTASSIUM BLD-SCNC: 4.1 MMOL/L (ref 3.5–4.7)
PROT SERPL-MCNC: 7.2 G/DL (ref 6.3–8)
RBC # BLD AUTO: 4.61 10*6/MM3 (ref 3.77–5.28)
SODIUM BLD-SCNC: 141 MMOL/L (ref 134–145)
WBC NRBC COR # BLD: 7.66 10*3/MM3 (ref 3.4–10.8)

## 2019-12-03 PROCEDURE — 36415 COLL VENOUS BLD VENIPUNCTURE: CPT

## 2019-12-03 PROCEDURE — 80053 COMPREHEN METABOLIC PANEL: CPT

## 2019-12-03 PROCEDURE — 99214 OFFICE O/P EST MOD 30 MIN: CPT | Performed by: INTERNAL MEDICINE

## 2019-12-03 PROCEDURE — 85025 COMPLETE CBC W/AUTO DIFF WBC: CPT

## 2019-12-03 NOTE — PROGRESS NOTES
Subjective     REASON FOR CONSULTATION:   1.Left breast cancer T1cN 0- ER ND positive HER-2 negative low-grade infiltrating ductal carcinoma Oncotype score of 6 postmastectomy and sentinel node biopsy on 8/20/19  2.  Oncotype score of 6 tamoxifen prescribed                               REQUESTING PHYSICIAN:  MD Dannielle Campa MD    History of Present Illness patient is a 49-year-old premenopausal woman with a small hormone positive breast cancer which is low-grade with a low Oncotype score.  She has been on tamoxifen for about 3 months now and after the first month developed dizziness and somnolence and we asked her to cut back to 10 mg for a couple of weeks but she misunderstood and has stayed on 10 mg and is tolerating this fairly well overall.    We discussed slowly going back to the 20 mg dose as we do not have good data on the effectiveness of the lower dose in the setting of treatment and she is agreeable    Should improve her bone density also  Her blood work does confirm that she is premenopausal with an elevated estradiol and low FSH and LH prior to starting her tamoxifen        Past Medical History:   Diagnosis Date   • Cancer (CMS/HCC) 05/2019    LEFT BREAST   • Chronic female pelvic pain    • Fibrocystic breast    • History of palpitations    • PONV (postoperative nausea and vomiting)    • Seasonal allergies         Past Surgical History:   Procedure Laterality Date   • BREAST BIOPSY     • BREAST RECONSTRUCTION Left 8/15/2019    Procedure: LEFT PLACEMENT TISSUE EXPANDER WITH ALLORDERM;  Surgeon: PATRICIO Levine MD;  Location: [facility];  Service: Plastics   • DILATATION AND CURETTAGE  1998   • DILATATION AND CURETTAGE  1991    Vaginal warts   • FOOT SURGERY Bilateral     BUNIONECTOMY-2005 AND 2015   • HYSTERECTOMY  08/2008   • MASTECTOMY W/ SENTINEL NODE BIOPSY Left 8/15/2019    Procedure: LEFt total mastectomy, LEFT  axilla sentinel node biopsy,  immediate recosntruction;  Surgeon: Sarah Barker MD;  Location: [facility] OR;  Service: General   • TIBIA FRACTURE SURGERY Right 1980   • WISDOM TOOTH EXTRACTION      ONC HISTTORY  patient is a 49-year-old -American female in excellent health on no chronic medications who was noted on her routine mammogram after 3-year break to have an abnormality in the left breast.  The patient thought she had some tenderness and showed this to her family doctor who sent her for a mammogram and this did show a definite difference from 3 years previously in that a12 mm abnormality was noted in her left breast at the 12:30 position.  Diagnostic mammogram showed in addition  that a to the lesion at 1230 there was another abnormality measuring 7 mm at the 1:00 region and several groups of indistinct calcifications were also noted in this area.  The ultrasound confirmed a solid mass measuring 11 x 10 at 1230 and a 4 x 4 millimeter mass at 4:00 in addition to 2 other cysts biopsy was done.on 6/26/2019 with the lesion at 12:00 showing atypical ductal hyperplasia measuring 1 mm negative for invasive cancer in the lesion at 1230 showed invasive mammary carcinoma no special type low-grade measuring 9 mm associated with low-grade DCIS and microcalcifications tumor was 90% ER +94% SD positive HER-2 negative low Ki-67 of 5.7%  Patient underwent bilateral breast MRI on 7/8/2019 and this showed the biopsy-proven mass at the 12 o'clock position measuring 1.5 cm residual mass in the 3 o'clock position marker from the ADH showed no suspicious residual enhancement.  There is no obvious adenopathy in the right breast was benign    Because of the multifocal nature of the disease the patient opted for a left mastectomy with immediate reconstruction and the final pathology specimen showed a 12 x 12 mm low-grade invasive ductal carcinoma with 2- sentinel nodes.  There was associated DCIS measuring 8 x 6 mm  and margins were all clear    Patient is  4 para 2 1 miscarriage and one  first childbirth was at age 23 she did not breast-feed she was on birth control for 1 year and has not had appeared since  when she had a hysterectomy for adenomyosis.  She is probably premenopausal as she has had no symptoms of menopause    Family history is completely negative for breast cancer ovarian cancer or any other malignancy that she is aware of father  at 60 of a heart attack mother 73 she has one brother who is healthy.    She is here today to discuss adjuvant treatment her Oncotype score came back at 6 Which I told her was excellent Which predicts no benefit from chemotherapy and distant recurrence at 9 years of 3% with hormonal blockade  We discussed tamoxifen as she is likely premenopausal and the side effect profile  The side effects and toxicities of Tamoxifen were discussed with the patient, including hot flashes, mood swings,depression, DVT and endometrial cancer. A list of drugs that interfere with the efficacy of tamoxifen and are to be avoided were given to the patient.  Estradiol FSH and LH levels have been drawn today to ensure that she is premenopausal to confirm she is premenopausal  Discussed in detail the side effects of tamoxifen and the information from the Oncotype DX score and she is very happy to get away without chemotherapy and agreeable to tamoxifen but she will call me next week to check her menopausal status before taking it.  No radiation is planned    Current Outpatient Medications on File Prior to Visit   Medication Sig Dispense Refill   • loratadine (CLARITIN) 10 MG tablet Take 10 mg by mouth Daily.     • Multiple Vitamin (MULTIVITAMIN) tablet Take 1 tablet by mouth Daily. LAST DOSE 19     • tamoxifen (NOLVADEX) 20 MG chemo tablet Take 1 tablet by mouth Daily. (Patient taking differently: Take 20 mg by mouth Daily. Taking .5 tablet per day) 90 tablet 3     No current  facility-administered medications on file prior to visit.         ALLERGIES:    Allergies   Allergen Reactions   • Codeine Rash     As a child, does not recall the severity of the reaction   • Percocet [Oxycodone-Acetaminophen] Itching     Itching but no rash        Social History     Socioeconomic History   • Marital status:      Spouse name: Not on file   • Number of children: Not on file   • Years of education: College   • Highest education level: Not on file   Occupational History   • Occupation:      Comment: [employer]   Tobacco Use   • Smoking status: Never Smoker   • Smokeless tobacco: Never Used   Substance and Sexual Activity   • Alcohol use: Yes     Frequency: Monthly or less     Drinks per session: 1 or 2     Binge frequency: Never     Comment: 3 PER MONTH-SOCIALLY   • Drug use: No   • Sexual activity: Yes     Birth control/protection: Surgical        Family History   Problem Relation Age of Onset   • Diabetes Mother    • Heart disease Mother    • Hypertension Mother    • Hyperlipidemia Mother    • Cancer Maternal Aunt         OF UNKNOWN ORGIN   • Diabetes Maternal Aunt    • Hyperlipidemia Maternal Aunt    • Breast cancer Paternal Aunt         PATERNAL GREAT AUNT-UNKNOLWN AGE    • Lung cancer Maternal Grandmother    • Brain cancer Maternal Grandmother    • Hypertension Father    • Heart attack Father    • Seizures Brother    • Malig Hyperthermia Neg Hx         Review of Systems   Constitutional: Negative.  Negative for fatigue.   Respiratory: Negative for chest tightness and shortness of breath.    Cardiovascular: Positive for chest pain (rib pain from expander 12/3/19). Negative for leg swelling.   Gastrointestinal: Negative for constipation, diarrhea, nausea and vomiting.   Musculoskeletal: Positive for myalgias (left breast tightness expander 12/3/19). Negative for joint swelling.   All other systems reviewed and are negative.       Objective     Vitals:  "   12/03/19 1451   BP: 109/65   Pulse: 88   Resp: 16   Temp: 98.6 °F (37 °C)   SpO2: 97%   Weight: 78.6 kg (173 lb 4.8 oz)   Height: 173 cm (68.11\")   PainSc: 0-No pain     Current Status 12/3/2019   ECOG score 0       Physical Exam   GENERAL:  Well-developed, well-nourished in no acute distress.   SKIN:  Warm, dry without rashes, purpura or petechiae.  EYES:  Pupils equal, round and reactive to light.  EOMs intact.  Conjunctivae normal.  EARS:  Hearing intact.  NOSE:  Septum midline.  No excoriations or nasal discharge.  MOUTH:  Tongue is well-papillated; no stomatitis or ulcers.  Lips normal.  THROAT:  Oropharynx without lesions or exudates.  NECK:  Supple with good range of motion; no thyromegaly or masses, no JVD.  LYMPHATICS:  No cervical, supraclavicular, axillary or inguinal adenopathy.  CHEST:  Lungs clear to auscultation. Good airflow.  BREASTS: Right breast is benign left chest wall shows an expander in place  CARDIAC:  Regular rate and rhythm without murmurs, rubs or gallops. Normal S1,S2.  ABDOMEN:  Soft, nontender with no hepatosplenomegaly or masses.  EXTREMITIES:  No clubbing, cyanosis or edema.  NEUROLOGICAL:  Cranial Nerves II-XII grossly intact.  No focal neurological deficits.  PSYCHIATRIC:  Normal affect and mood.        RECENT LABS:  Hematology WBC   Date Value Ref Range Status   12/03/2019 7.66 3.40 - 10.80 10*3/mm3 Final     RBC   Date Value Ref Range Status   12/03/2019 4.61 3.77 - 5.28 10*6/mm3 Final     Hemoglobin   Date Value Ref Range Status   12/03/2019 13.5 12.0 - 15.9 g/dL Final     Hematocrit   Date Value Ref Range Status   12/03/2019 42.8 34.0 - 46.6 % Final     Platelets   Date Value Ref Range Status   12/03/2019 236 140 - 450 10*3/mm3 Final            Pathology          Study Result     BILATERAL BREAST MRI WITHOUT AND WITH CONTRAST       IMPRESSION:  1. Biopsy-proven malignancy in the left breast at the 12 o'clock  position with the more posterior of the barrel-shaped metallic " clip seen  centrally within the residual mass which measures on the order of 1.5 cm  in greatest dimension. The more anterior barrel-shaped metallic clips is  located within the hematoma. No other suspicious findings are seen  within the left breast and there is no evidence for left axillary  adenopathy.  2. The top hat shaped metallic clip at the 3 o'clock position  corresponds to the site of ADH and is not associated with any residual  suspicious enhancement. The metallic clip is seen within a dominant  hematoma cavity that measures on the order of 4 cm in greatest  dimension.  3. There are no findings suspicious for malignancy in the right breast.     BI-RADS Category 6: Known malignancy.     This report was finalized on 7/8/2019 11:51 AM by Dr. Harjinder Encinas M.D.             Assessment/Plan   1.  T1cN0 low-grade ER NJ positive HER-2 negative infiltrating ductal carcinoma left breast with associated DCIS multifocal post mastectomy and sentinel node biopsy-Oncotype score of 6  · Tamoxifen prescribed in 9/19-patient cut to 10 mg after 1 month and stayed on this dose till 12/19 when escalated to 20 mg    2.   negative 9 gene invitae panel testing    Plan  1.  Continue  Tamoxifen 20 mg daily for at least 5 years with a switch to an AI when she is menopausal    2.  Follow-up with the nurse practitioner in 3 months and see me in 6 months to assess her tolerance and she will call if she cannot tolerate the full dose

## 2019-12-06 ENCOUNTER — APPOINTMENT (OUTPATIENT)
Dept: ONCOLOGY | Facility: CLINIC | Age: 49
End: 2019-12-06

## 2019-12-06 ENCOUNTER — APPOINTMENT (OUTPATIENT)
Dept: LAB | Facility: HOSPITAL | Age: 49
End: 2019-12-06

## 2019-12-11 ENCOUNTER — APPOINTMENT (OUTPATIENT)
Dept: PREADMISSION TESTING | Facility: HOSPITAL | Age: 49
End: 2019-12-11

## 2019-12-11 VITALS
BODY MASS INDEX: 26.51 KG/M2 | RESPIRATION RATE: 16 BRPM | HEIGHT: 68 IN | TEMPERATURE: 97.7 F | WEIGHT: 174.9 LBS | DIASTOLIC BLOOD PRESSURE: 71 MMHG | SYSTOLIC BLOOD PRESSURE: 114 MMHG | OXYGEN SATURATION: 100 % | HEART RATE: 88 BPM

## 2019-12-19 ENCOUNTER — ANESTHESIA EVENT (OUTPATIENT)
Dept: PERIOP | Facility: HOSPITAL | Age: 49
End: 2019-12-19

## 2019-12-19 ENCOUNTER — ANESTHESIA (OUTPATIENT)
Dept: PERIOP | Facility: HOSPITAL | Age: 49
End: 2019-12-19

## 2019-12-19 ENCOUNTER — HOSPITAL ENCOUNTER (OUTPATIENT)
Facility: HOSPITAL | Age: 49
Setting detail: HOSPITAL OUTPATIENT SURGERY
Discharge: HOME | End: 2019-12-19
Attending: SURGERY | Admitting: SURGERY

## 2019-12-19 VITALS
BODY MASS INDEX: 26.54 KG/M2 | OXYGEN SATURATION: 99 % | TEMPERATURE: 98.2 F | HEIGHT: 68 IN | RESPIRATION RATE: 16 BRPM | WEIGHT: 175.13 LBS | HEART RATE: 95 BPM | SYSTOLIC BLOOD PRESSURE: 134 MMHG | DIASTOLIC BLOOD PRESSURE: 88 MMHG

## 2019-12-19 DIAGNOSIS — Z98.82 H/O LEFT BREAST IMPLANT: ICD-10-CM

## 2019-12-19 PROCEDURE — 25010000003 BUPIVACAINE LIPOSOME 1.3 % SUSPENSION 20 ML VIAL: Performed by: SURGERY

## 2019-12-19 PROCEDURE — C9290 INJ, BUPIVACAINE LIPOSOME: HCPCS | Performed by: SURGERY

## 2019-12-19 PROCEDURE — 25010000002 MIDAZOLAM PER 1 MG: Performed by: ANESTHESIOLOGY

## 2019-12-19 PROCEDURE — 25010000002 GENTAMICIN PER 80 MG: Performed by: SURGERY

## 2019-12-19 PROCEDURE — 25010000002 ONDANSETRON PER 1 MG: Performed by: NURSE ANESTHETIST, CERTIFIED REGISTERED

## 2019-12-19 PROCEDURE — 25010000002 DEXAMETHASONE PER 1 MG: Performed by: SURGERY

## 2019-12-19 PROCEDURE — 25010000002 PROPOFOL 10 MG/ML EMULSION: Performed by: NURSE ANESTHETIST, CERTIFIED REGISTERED

## 2019-12-19 PROCEDURE — 25010000002 FENTANYL CITRATE (PF) 100 MCG/2ML SOLUTION: Performed by: NURSE ANESTHETIST, CERTIFIED REGISTERED

## 2019-12-19 PROCEDURE — C1789 PROSTHESIS, BREAST, IMP: HCPCS | Performed by: SURGERY

## 2019-12-19 PROCEDURE — 25010000003 CEFAZOLIN PER 500 MG: Performed by: SURGERY

## 2019-12-19 PROCEDURE — 88305 TISSUE EXAM BY PATHOLOGIST: CPT | Performed by: SURGERY

## 2019-12-19 PROCEDURE — 25010000002 ROPIVACAINE PER 1 MG: Performed by: SURGERY

## 2019-12-19 DEVICE — SALINE BREAST IMPLANT WITH DIAPHRAGM VALVE, 560CC  SMOOTH ROUND SALINE
Type: IMPLANTABLE DEVICE | Site: BREAST | Status: FUNCTIONAL
Brand: MENTOR SMOOTH ROUND HIGH PROFILE

## 2019-12-19 RX ORDER — ONDANSETRON 4 MG/1
4 TABLET, FILM COATED ORAL ONCE AS NEEDED
Status: DISCONTINUED | OUTPATIENT
Start: 2019-12-19 | End: 2019-12-19 | Stop reason: HOSPADM

## 2019-12-19 RX ORDER — CELECOXIB 200 MG/1
400 CAPSULE ORAL ONCE
Status: COMPLETED | OUTPATIENT
Start: 2019-12-19 | End: 2019-12-19

## 2019-12-19 RX ORDER — HYDROCODONE BITARTRATE AND ACETAMINOPHEN 7.5; 325 MG/1; MG/1
1 TABLET ORAL ONCE AS NEEDED
Status: DISCONTINUED | OUTPATIENT
Start: 2019-12-19 | End: 2019-12-19 | Stop reason: HOSPADM

## 2019-12-19 RX ORDER — CLINDAMYCIN PHOSPHATE 900 MG/50ML
900 INJECTION INTRAVENOUS ONCE
Status: COMPLETED | OUTPATIENT
Start: 2019-12-19 | End: 2019-12-19

## 2019-12-19 RX ORDER — MAGNESIUM HYDROXIDE 1200 MG/15ML
LIQUID ORAL AS NEEDED
Status: DISCONTINUED | OUTPATIENT
Start: 2019-12-19 | End: 2019-12-19 | Stop reason: HOSPADM

## 2019-12-19 RX ORDER — PROPOFOL 10 MG/ML
VIAL (ML) INTRAVENOUS AS NEEDED
Status: DISCONTINUED | OUTPATIENT
Start: 2019-12-19 | End: 2019-12-19 | Stop reason: SURG

## 2019-12-19 RX ORDER — DOCUSATE SODIUM 100 MG/1
100 CAPSULE, LIQUID FILLED ORAL ONCE
Status: COMPLETED | OUTPATIENT
Start: 2019-12-19 | End: 2019-12-19

## 2019-12-19 RX ORDER — SCOLOPAMINE TRANSDERMAL SYSTEM 1 MG/1
1 PATCH, EXTENDED RELEASE TRANSDERMAL ONCE
Status: DISCONTINUED | OUTPATIENT
Start: 2019-12-19 | End: 2019-12-19 | Stop reason: HOSPADM

## 2019-12-19 RX ORDER — FLUMAZENIL 0.1 MG/ML
0.2 INJECTION INTRAVENOUS AS NEEDED
Status: DISCONTINUED | OUTPATIENT
Start: 2019-12-19 | End: 2019-12-19 | Stop reason: HOSPADM

## 2019-12-19 RX ORDER — PROMETHAZINE HYDROCHLORIDE 25 MG/ML
12.5 INJECTION, SOLUTION INTRAMUSCULAR; INTRAVENOUS ONCE AS NEEDED
Status: DISCONTINUED | OUTPATIENT
Start: 2019-12-19 | End: 2019-12-19 | Stop reason: HOSPADM

## 2019-12-19 RX ORDER — SODIUM CHLORIDE 0.9 % (FLUSH) 0.9 %
3-10 SYRINGE (ML) INJECTION AS NEEDED
Status: DISCONTINUED | OUTPATIENT
Start: 2019-12-19 | End: 2019-12-19 | Stop reason: HOSPADM

## 2019-12-19 RX ORDER — DEXAMETHASONE SODIUM PHOSPHATE 4 MG/ML
8 INJECTION, SOLUTION INTRA-ARTICULAR; INTRALESIONAL; INTRAMUSCULAR; INTRAVENOUS; SOFT TISSUE ONCE
Status: COMPLETED | OUTPATIENT
Start: 2019-12-19 | End: 2019-12-19

## 2019-12-19 RX ORDER — DIPHENHYDRAMINE HCL 25 MG
25 CAPSULE ORAL
Status: DISCONTINUED | OUTPATIENT
Start: 2019-12-19 | End: 2019-12-19 | Stop reason: HOSPADM

## 2019-12-19 RX ORDER — HYDROXYZINE PAMOATE 50 MG/1
50 CAPSULE ORAL ONCE
Status: COMPLETED | OUTPATIENT
Start: 2019-12-19 | End: 2019-12-19

## 2019-12-19 RX ORDER — NALOXONE HCL 0.4 MG/ML
0.2 VIAL (ML) INJECTION AS NEEDED
Status: DISCONTINUED | OUTPATIENT
Start: 2019-12-19 | End: 2019-12-19 | Stop reason: HOSPADM

## 2019-12-19 RX ORDER — CYCLOBENZAPRINE HCL 10 MG
5 TABLET ORAL ONCE
Status: COMPLETED | OUTPATIENT
Start: 2019-12-19 | End: 2019-12-19

## 2019-12-19 RX ORDER — LIDOCAINE HYDROCHLORIDE 20 MG/ML
INJECTION, SOLUTION INFILTRATION; PERINEURAL AS NEEDED
Status: DISCONTINUED | OUTPATIENT
Start: 2019-12-19 | End: 2019-12-19 | Stop reason: SURG

## 2019-12-19 RX ORDER — MIDAZOLAM HYDROCHLORIDE 1 MG/ML
2 INJECTION INTRAMUSCULAR; INTRAVENOUS
Status: DISCONTINUED | OUTPATIENT
Start: 2019-12-19 | End: 2019-12-19 | Stop reason: HOSPADM

## 2019-12-19 RX ORDER — HYDROMORPHONE HYDROCHLORIDE 1 MG/ML
0.25 INJECTION, SOLUTION INTRAMUSCULAR; INTRAVENOUS; SUBCUTANEOUS
Status: DISCONTINUED | OUTPATIENT
Start: 2019-12-19 | End: 2019-12-19 | Stop reason: HOSPADM

## 2019-12-19 RX ORDER — SODIUM CHLORIDE, SODIUM LACTATE, POTASSIUM CHLORIDE, CALCIUM CHLORIDE 600; 310; 30; 20 MG/100ML; MG/100ML; MG/100ML; MG/100ML
9 INJECTION, SOLUTION INTRAVENOUS CONTINUOUS
Status: DISCONTINUED | OUTPATIENT
Start: 2019-12-19 | End: 2019-12-19 | Stop reason: HOSPADM

## 2019-12-19 RX ORDER — HYDRALAZINE HYDROCHLORIDE 20 MG/ML
5 INJECTION INTRAMUSCULAR; INTRAVENOUS
Status: DISCONTINUED | OUTPATIENT
Start: 2019-12-19 | End: 2019-12-19 | Stop reason: HOSPADM

## 2019-12-19 RX ORDER — ESMOLOL HYDROCHLORIDE 10 MG/ML
INJECTION INTRAVENOUS AS NEEDED
Status: DISCONTINUED | OUTPATIENT
Start: 2019-12-19 | End: 2019-12-19 | Stop reason: SURG

## 2019-12-19 RX ORDER — PROMETHAZINE HYDROCHLORIDE 25 MG/1
25 TABLET ORAL ONCE AS NEEDED
Status: DISCONTINUED | OUTPATIENT
Start: 2019-12-19 | End: 2019-12-19 | Stop reason: HOSPADM

## 2019-12-19 RX ORDER — BUPIVACAINE HYDROCHLORIDE AND EPINEPHRINE 2.5; 5 MG/ML; UG/ML
INJECTION, SOLUTION INFILTRATION; PERINEURAL AS NEEDED
Status: DISCONTINUED | OUTPATIENT
Start: 2019-12-19 | End: 2019-12-19 | Stop reason: HOSPADM

## 2019-12-19 RX ORDER — ISOPROPYL ALCOHOL 70 ML/100ML
LIQUID TOPICAL AS NEEDED
Status: DISCONTINUED | OUTPATIENT
Start: 2019-12-19 | End: 2019-12-19 | Stop reason: HOSPADM

## 2019-12-19 RX ORDER — ONDANSETRON 2 MG/ML
4 INJECTION INTRAMUSCULAR; INTRAVENOUS ONCE AS NEEDED
Status: DISCONTINUED | OUTPATIENT
Start: 2019-12-19 | End: 2019-12-19 | Stop reason: HOSPADM

## 2019-12-19 RX ORDER — FENTANYL CITRATE 50 UG/ML
INJECTION, SOLUTION INTRAMUSCULAR; INTRAVENOUS AS NEEDED
Status: DISCONTINUED | OUTPATIENT
Start: 2019-12-19 | End: 2019-12-19 | Stop reason: SURG

## 2019-12-19 RX ORDER — ROCURONIUM BROMIDE 10 MG/ML
INJECTION, SOLUTION INTRAVENOUS AS NEEDED
Status: DISCONTINUED | OUTPATIENT
Start: 2019-12-19 | End: 2019-12-19 | Stop reason: SURG

## 2019-12-19 RX ORDER — CYCLOBENZAPRINE HCL 10 MG
10 TABLET ORAL ONCE
Status: COMPLETED | OUTPATIENT
Start: 2019-12-19 | End: 2019-12-19

## 2019-12-19 RX ORDER — HYDROCODONE BITARTRATE AND ACETAMINOPHEN 5; 325 MG/1; MG/1
1 TABLET ORAL ONCE AS NEEDED
Status: DISCONTINUED | OUTPATIENT
Start: 2019-12-19 | End: 2019-12-19 | Stop reason: HOSPADM

## 2019-12-19 RX ORDER — PROMETHAZINE HYDROCHLORIDE 25 MG/ML
6.25 INJECTION, SOLUTION INTRAMUSCULAR; INTRAVENOUS
Status: DISCONTINUED | OUTPATIENT
Start: 2019-12-19 | End: 2019-12-19 | Stop reason: HOSPADM

## 2019-12-19 RX ORDER — FAMOTIDINE 10 MG/ML
20 INJECTION, SOLUTION INTRAVENOUS ONCE
Status: COMPLETED | OUTPATIENT
Start: 2019-12-19 | End: 2019-12-19

## 2019-12-19 RX ORDER — PROMETHAZINE HYDROCHLORIDE 25 MG/1
25 SUPPOSITORY RECTAL ONCE AS NEEDED
Status: DISCONTINUED | OUTPATIENT
Start: 2019-12-19 | End: 2019-12-19 | Stop reason: HOSPADM

## 2019-12-19 RX ORDER — SODIUM CHLORIDE 0.9 % (FLUSH) 0.9 %
3 SYRINGE (ML) INJECTION EVERY 12 HOURS SCHEDULED
Status: DISCONTINUED | OUTPATIENT
Start: 2019-12-19 | End: 2019-12-19 | Stop reason: HOSPADM

## 2019-12-19 RX ORDER — METOPROLOL TARTRATE 5 MG/5ML
INJECTION INTRAVENOUS AS NEEDED
Status: DISCONTINUED | OUTPATIENT
Start: 2019-12-19 | End: 2019-12-19 | Stop reason: SURG

## 2019-12-19 RX ORDER — FENTANYL CITRATE 50 UG/ML
50 INJECTION, SOLUTION INTRAMUSCULAR; INTRAVENOUS
Status: DISCONTINUED | OUTPATIENT
Start: 2019-12-19 | End: 2019-12-19 | Stop reason: HOSPADM

## 2019-12-19 RX ORDER — ONDANSETRON 2 MG/ML
INJECTION INTRAMUSCULAR; INTRAVENOUS AS NEEDED
Status: DISCONTINUED | OUTPATIENT
Start: 2019-12-19 | End: 2019-12-19 | Stop reason: SURG

## 2019-12-19 RX ORDER — CELECOXIB 200 MG/1
200 CAPSULE ORAL ONCE
Status: COMPLETED | OUTPATIENT
Start: 2019-12-19 | End: 2019-12-19

## 2019-12-19 RX ORDER — DIPHENHYDRAMINE HYDROCHLORIDE 50 MG/ML
12.5 INJECTION INTRAMUSCULAR; INTRAVENOUS
Status: DISCONTINUED | OUTPATIENT
Start: 2019-12-19 | End: 2019-12-19 | Stop reason: HOSPADM

## 2019-12-19 RX ORDER — ACETAMINOPHEN 325 MG/1
975 TABLET ORAL ONCE
Status: COMPLETED | OUTPATIENT
Start: 2019-12-19 | End: 2019-12-19

## 2019-12-19 RX ORDER — EPHEDRINE SULFATE 50 MG/ML
5 INJECTION, SOLUTION INTRAVENOUS ONCE AS NEEDED
Status: DISCONTINUED | OUTPATIENT
Start: 2019-12-19 | End: 2019-12-19 | Stop reason: HOSPADM

## 2019-12-19 RX ORDER — ACETAMINOPHEN 325 MG/1
650 TABLET ORAL ONCE AS NEEDED
Status: DISCONTINUED | OUTPATIENT
Start: 2019-12-19 | End: 2019-12-19 | Stop reason: HOSPADM

## 2019-12-19 RX ORDER — HYDROMORPHONE HYDROCHLORIDE 1 MG/ML
0.5 INJECTION, SOLUTION INTRAMUSCULAR; INTRAVENOUS; SUBCUTANEOUS
Status: DISCONTINUED | OUTPATIENT
Start: 2019-12-19 | End: 2019-12-19 | Stop reason: HOSPADM

## 2019-12-19 RX ORDER — LABETALOL HYDROCHLORIDE 5 MG/ML
5 INJECTION, SOLUTION INTRAVENOUS
Status: DISCONTINUED | OUTPATIENT
Start: 2019-12-19 | End: 2019-12-19 | Stop reason: HOSPADM

## 2019-12-19 RX ORDER — MIDAZOLAM HYDROCHLORIDE 1 MG/ML
1 INJECTION INTRAMUSCULAR; INTRAVENOUS
Status: DISCONTINUED | OUTPATIENT
Start: 2019-12-19 | End: 2019-12-19 | Stop reason: HOSPADM

## 2019-12-19 RX ORDER — LIDOCAINE HYDROCHLORIDE 10 MG/ML
0.5 INJECTION, SOLUTION EPIDURAL; INFILTRATION; INTRACAUDAL; PERINEURAL ONCE AS NEEDED
Status: DISCONTINUED | OUTPATIENT
Start: 2019-12-19 | End: 2019-12-19 | Stop reason: HOSPADM

## 2019-12-19 RX ORDER — PROMETHAZINE HYDROCHLORIDE 25 MG/1
12.5 TABLET ORAL ONCE AS NEEDED
Status: DISCONTINUED | OUTPATIENT
Start: 2019-12-19 | End: 2019-12-19 | Stop reason: HOSPADM

## 2019-12-19 RX ADMIN — METOPROLOL TARTRATE 1 MG: 1 INJECTION, SOLUTION INTRAVENOUS at 08:40

## 2019-12-19 RX ADMIN — CELECOXIB 200 MG: 200 CAPSULE ORAL at 10:15

## 2019-12-19 RX ADMIN — CLINDAMYCIN PHOSPHATE 900 MG: 18 INJECTION, SOLUTION INTRAMUSCULAR; INTRAVENOUS at 07:04

## 2019-12-19 RX ADMIN — HYDROCODONE BITARTRATE AND ACETAMINOPHEN 1 TABLET: 5; 325 TABLET ORAL at 10:20

## 2019-12-19 RX ADMIN — FENTANYL CITRATE 50 MCG: 50 INJECTION INTRAMUSCULAR; INTRAVENOUS at 07:09

## 2019-12-19 RX ADMIN — ONDANSETRON HYDROCHLORIDE 4 MG: 2 SOLUTION INTRAMUSCULAR; INTRAVENOUS at 06:57

## 2019-12-19 RX ADMIN — DOCUSATE SODIUM 100 MG: 100 CAPSULE, LIQUID FILLED ORAL at 10:15

## 2019-12-19 RX ADMIN — CYCLOBENZAPRINE 5 MG: 10 TABLET, FILM COATED ORAL at 10:15

## 2019-12-19 RX ADMIN — FENTANYL CITRATE 50 MCG: 50 INJECTION, SOLUTION INTRAMUSCULAR; INTRAVENOUS at 09:35

## 2019-12-19 RX ADMIN — SUGAMMADEX 200 MG: 100 INJECTION, SOLUTION INTRAVENOUS at 08:49

## 2019-12-19 RX ADMIN — ESMOLOL HYDROCHLORIDE 10 MG: 10 INJECTION, SOLUTION INTRAVENOUS at 07:36

## 2019-12-19 RX ADMIN — METOPROLOL TARTRATE 1 MG: 1 INJECTION, SOLUTION INTRAVENOUS at 08:24

## 2019-12-19 RX ADMIN — CYCLOBENZAPRINE 10 MG: 10 TABLET, FILM COATED ORAL at 06:11

## 2019-12-19 RX ADMIN — METOPROLOL TARTRATE 1 MG: 1 INJECTION, SOLUTION INTRAVENOUS at 08:50

## 2019-12-19 RX ADMIN — ROCURONIUM BROMIDE 50 MG: 10 INJECTION INTRAVENOUS at 07:00

## 2019-12-19 RX ADMIN — MIDAZOLAM 2 MG: 1 INJECTION INTRAMUSCULAR; INTRAVENOUS at 06:40

## 2019-12-19 RX ADMIN — ROCURONIUM BROMIDE 10 MG: 10 INJECTION INTRAVENOUS at 07:38

## 2019-12-19 RX ADMIN — FAMOTIDINE 20 MG: 10 INJECTION INTRAVENOUS at 06:40

## 2019-12-19 RX ADMIN — CELECOXIB 400 MG: 200 CAPSULE ORAL at 06:11

## 2019-12-19 RX ADMIN — HYDROXYZINE PAMOATE 50 MG: 50 CAPSULE ORAL at 06:11

## 2019-12-19 RX ADMIN — SODIUM CHLORIDE, POTASSIUM CHLORIDE, SODIUM LACTATE AND CALCIUM CHLORIDE 9 ML/HR: 600; 310; 30; 20 INJECTION, SOLUTION INTRAVENOUS at 06:40

## 2019-12-19 RX ADMIN — ESMOLOL HYDROCHLORIDE 10 MG: 10 INJECTION, SOLUTION INTRAVENOUS at 08:14

## 2019-12-19 RX ADMIN — DEXAMETHASONE SODIUM PHOSPHATE 8 MG: 4 INJECTION, SOLUTION INTRAMUSCULAR; INTRAVENOUS at 06:11

## 2019-12-19 RX ADMIN — ACETAMINOPHEN 975 MG: 325 TABLET, FILM COATED ORAL at 06:11

## 2019-12-19 RX ADMIN — LIDOCAINE HYDROCHLORIDE 40 MG: 20 INJECTION, SOLUTION INFILTRATION; PERINEURAL at 07:00

## 2019-12-19 RX ADMIN — SCOPALAMINE 1 PATCH: 1 PATCH, EXTENDED RELEASE TRANSDERMAL at 06:10

## 2019-12-19 RX ADMIN — PROPOFOL 200 MG: 10 INJECTION, EMULSION INTRAVENOUS at 07:00

## 2019-12-19 RX ADMIN — PROPOFOL 50 MG: 10 INJECTION, EMULSION INTRAVENOUS at 07:01

## 2019-12-19 RX ADMIN — FENTANYL CITRATE 50 MCG: 50 INJECTION INTRAMUSCULAR; INTRAVENOUS at 07:00

## 2019-12-19 NOTE — ANESTHESIA PREPROCEDURE EVALUATION
Anesthesia Evaluation     Patient summary reviewed and Nursing notes reviewed   history of anesthetic complications: PONV               Airway   Mallampati: I  Dental      Pulmonary - negative pulmonary ROS   Cardiovascular - negative cardio ROS    Rhythm: regular  Rate: normal        Neuro/Psych- negative ROS  GI/Hepatic/Renal/Endo - negative ROS     Musculoskeletal (-) negative ROS    Abdominal    Substance History - negative use     OB/GYN negative ob/gyn ROS         Other      history of cancer                    Anesthesia Plan    ASA 1     general     intravenous induction     Anesthetic plan, all risks, benefits, and alternatives have been provided, discussed and informed consent has been obtained with: patient.

## 2019-12-19 NOTE — OP NOTE
Preoperative diagnosis: Acquired absence of left breast, history of left breast cancer, right breast macromastia with asymmetry, excess left medial mastectomy incision skin edge  Postoperative diagnosis: Same  Procedure: Removal of left tissue expander placement of left Yeoman high-profile round smooth saline implant 560 cc size filled to 580 cc subpectoral, right breast reduction for symmetry, revision of left medial mastectomy incision with 4 cm layered closure  Surgeon: Tre Levine MD  Assistant: Kya GONZALEZ CSA  Anesthesia: General endotracheal  Drains x2  Complications none apparent  Estimated blood loss 30 cc  Complications none apparent  Indications for procedure Екатерина underwent mastectomy and immediate reconstruction with tissue expander and AlloDerm in August of this year for a left breast cancer she is done well with serial expansion is now ready for removal of her expander and placement of her final implant on the left side her right breast is larger and more ptotic and we plan a small reduction with lift on the right side for symmetry.  She understands the risk benefits and complications and agrees to proceed and has reviewed the informed consent from the [employer] in my office she had no further questions today and she is marked prior to surgery with a modified Wise pattern on the right side with a superior central pedicle planned and we marked the left breast going in through her previous mastectomy incision.  Procedure: Patient's brought to the operating room placed in the operative table supine position.  Satisfactory general endotracheal anesthesia is achieved without difficulty.  We injected dilute local anesthesia about all of our operative sites on both sides.  We allowed this for several minutes while she was prepped and draped in a sterile fashion.  We initiated surgery by making a small incision through her mastectomy incision dissecting down and defined  the pectoral muscle and elevating the superior aspects of the incision off of the pectoral muscle and then opening the pectoral muscle and a muscle-splitting fashion we identified the port using an 18-gauge needle began deflating the expander with surgical suction and an 18-gauge needle.  We then de-epithelialized around the nipple areolar complex on the right breast at 40 mm and de-epithelialized within our modified Garces pattern.  We left the nipple areolar complex well vascularized on an entirely superior and central pedicle we resected breast tissue inferior to a modified Wise pattern in the inferior aspects of the breast and a total resection weight of 78.5 g was obtained resecting this lower portion of the breast within our modified Garces pattern with Bovie cauterization.  We scored the dermis lateral and inferior to the nipple areolar complex elevating it on a superior central mound of her breast tissue hemostasis was excellent we injected additional quarter percent Marcaine with epinephrine for postoperative analgesia placed 15 Palauan Aditya drain irrigated with antibiotic containing saline confirmed excellent hemostasis mobilize nipple areolar complex into its new location it had excellent circulation.  We then brought the incisions together with the modified Garces pattern with surgical staples these were replaced with multiple 4-0 Vicryls and 5-0 PDS to complete the closure of the reduction on the right side.  I then changed gown and gloves and removed her expander irrigated thoroughly with antibiotic containing saline on the left side inspected visually the AlloDerm had taken quite nicely.  We released the capsule superiorly superior medially and superior laterally and then medially down to the inferior medial aspects and several radial releases were performed as well we maintained excellent hemostasis with Bovie cauterization we placed 15 Palauan Aditya drain secured with a nylon suture we injected the  remaining quarter percent Marcaine with epinephrine around her drain site and between the pectoral muscles.  We then irrigated with additional antibiotic containing saline and inspected thoroughly visually and there were no bleeding points noted everything is been cauterized thoroughly with Bovie cauterization we irrigated with additional antibiotic containing saline and 50% Betadine and then using a fresh pair of powder free gloves we opened a Burden high profile smooth round saline implant 560 cc size it was free from any gross defects it was bathed in our triple antibiotic containing saline and 50% Betadine we always handled it with a fresh pair of powder free gloves and then carefully placed it into the left reconstructive pocket using an introduction sleeve we filled it with a closed system technique with 580 cc of sterile normal saline this gave the best overall match to the contralateral breast with patient sitting up.  We removed any remaining air the fill tube was removed fill tube plug was seated into position the drain was in good position hemostasis was excellent we then closed the muscle layer with running 2-0 Vicryl 3-0 Vicryl and subcutaneous layer 4-0 Vicryl in the deep dermis and a 5-0 PDS subcuticular suture was used to complete the layered closure there is a small excess of skin tissue at the medial aspect of her previous mastectomy incision we carefully de-epithelialized an ellipse of tissue at the end of this small dogear attained excellent hemostasis irrigated with antibiotic containing saline and then closed in a layered fashion with 4-0 Vicryl and then a 4-0 Prolene subcuticular suture was placed for a 4 cm layered closure we applied Dermabond across all of the incisions Biopatch is at the drain exit sites then gauze and Tegaderm and then gauze and Tegaderm over all of the incisions ABD pads and a lightly applied 6 inch Ace wrap was placed circulation to the skin edges was excellent there is  no tension on the closures the nipple areolar circulation was excellent.  She went to the recovery area in satisfactory condition needle and sponge counts were correct x2.

## 2019-12-19 NOTE — ANESTHESIA POSTPROCEDURE EVALUATION
Patient: Екатерина Strauss    Procedure Summary     Date:  12/19/19 Room / Location:  [facility] OR [address] [place] [address] MAIN OR    Anesthesia Start:  0652 Anesthesia Stop:  0911    Procedures:       REMOVAL LEFT TISSUE EXPANDER AND PLACEMENT OF IMPLANT (Bilateral Breast)      RIGHT REDUCTION FOR SYMMETRY (Right Chest) Diagnosis:      Surgeon:  PATRICIO Levine MD Provider:  Gadiel Covarrubias MD    Anesthesia Type:  general ASA Status:  1          Anesthesia Type: general    Vitals  Vitals Value Taken Time   /82 12/19/2019  9:20 AM   Temp     Pulse 98 12/19/2019  9:23 AM   Resp     SpO2 100 % 12/19/2019  9:23 AM   Vitals shown include unvalidated device data.        Post Anesthesia Care and Evaluation    Patient location during evaluation: PACU  Patient participation: complete - patient participated  Level of consciousness: awake and alert  Pain management: adequate  Airway patency: patent  Anesthetic complications: No anesthetic complications    Cardiovascular status: acceptable  Respiratory status: acceptable  Hydration status: acceptable    Comments: --------------------            12/19/19 0614     --------------------   BP:                  Pulse:      92       Resp:                Temp:                SpO2:      98%      --------------------

## 2019-12-19 NOTE — ANESTHESIA PROCEDURE NOTES
Airway  Urgency: elective    Date/Time: 12/19/2019 7:02 AM  Airway not difficult    General Information and Staff    Patient location during procedure: OR  Anesthesiologist: Gadiel Covarrubias MD  CRNA: Jennifer Shore CRNA    Indications and Patient Condition  Indications for airway management: airway protection    Preoxygenated: yes  Mask difficulty assessment: 1 - vent by mask    Final Airway Details  Final airway type: endotracheal airway      Successful airway: ETT  Cuffed: yes   Successful intubation technique: direct laryngoscopy  Facilitating devices/methods: intubating stylet  Endotracheal tube insertion site: oral  Blade: Rod  Blade size: 2  ETT size (mm): 7.0  Cormack-Lehane Classification: grade I - full view of glottis  Placement verified by: chest auscultation and capnometry   Cuff volume (mL): 7  Measured from: teeth  ETT/EBT  to teeth (cm): 20  Number of attempts at approach: 1  Assessment: lips, teeth, and gum same as pre-op and atraumatic intubation

## 2019-12-20 LAB
CYTO UR: NORMAL
LAB AP CASE REPORT: NORMAL
PATH REPORT.FINAL DX SPEC: NORMAL
PATH REPORT.GROSS SPEC: NORMAL

## 2020-01-31 ENCOUNTER — TELEPHONE (OUTPATIENT)
Dept: SURGERY | Facility: CLINIC | Age: 50
End: 2020-01-31

## 2020-01-31 NOTE — TELEPHONE ENCOUNTER
Left message for pt regarding the following appts:    SHERLYN at [facility] on 04/20/20 at 8:00    F/U with Dr. Barker on 04/30/2020 at 1:00    CMA

## 2020-02-03 ENCOUNTER — TELEPHONE (OUTPATIENT)
Dept: ONCOLOGY | Facility: CLINIC | Age: 50
End: 2020-02-03

## 2020-02-03 RX ORDER — TAMOXIFEN CITRATE 20 MG/1
20 TABLET ORAL DAILY
Qty: 90 TABLET | Refills: 0 | Status: SHIPPED | OUTPATIENT
Start: 2020-02-03 | End: 2020-04-27

## 2020-02-03 NOTE — TELEPHONE ENCOUNTER
Pt cant get her [facility] pharmacy to fill her tamoxifen in a timely manor. Asking to be sent somewhere else. Sent to [facility]

## 2020-02-03 NOTE — TELEPHONE ENCOUNTER
Patient having trouble getting her Tamoxifen from [facility]. They only gave her 7 pills this last time. Wants to discuss what other pharmacies she could use that would not have any problems getting medication. Callback at [phone]

## 2020-02-25 ENCOUNTER — OFFICE VISIT (OUTPATIENT)
Dept: ONCOLOGY | Facility: CLINIC | Age: 50
End: 2020-02-25

## 2020-02-25 ENCOUNTER — DOCUMENTATION (OUTPATIENT)
Dept: ONCOLOGY | Facility: CLINIC | Age: 50
End: 2020-02-25

## 2020-02-25 ENCOUNTER — LAB (OUTPATIENT)
Dept: LAB | Facility: HOSPITAL | Age: 50
End: 2020-02-25

## 2020-02-25 VITALS
OXYGEN SATURATION: 98 % | HEART RATE: 94 BPM | SYSTOLIC BLOOD PRESSURE: 116 MMHG | HEIGHT: 68 IN | DIASTOLIC BLOOD PRESSURE: 78 MMHG | RESPIRATION RATE: 16 BRPM | BODY MASS INDEX: 26.3 KG/M2 | WEIGHT: 173.5 LBS | TEMPERATURE: 99.1 F

## 2020-02-25 DIAGNOSIS — J30.2 SEASONAL ALLERGIES: ICD-10-CM

## 2020-02-25 DIAGNOSIS — Z17.0 MALIGNANT NEOPLASM OF UPPER-OUTER QUADRANT OF LEFT BREAST IN FEMALE, ESTROGEN RECEPTOR POSITIVE (HCC): Primary | ICD-10-CM

## 2020-02-25 DIAGNOSIS — C50.412 MALIGNANT NEOPLASM OF UPPER-OUTER QUADRANT OF LEFT BREAST IN FEMALE, ESTROGEN RECEPTOR POSITIVE (HCC): Primary | ICD-10-CM

## 2020-02-25 LAB
ALBUMIN SERPL-MCNC: 4 G/DL (ref 3.5–5.2)
ALBUMIN/GLOB SERPL: 1.2 G/DL (ref 1.1–2.4)
ALP SERPL-CCNC: 47 U/L (ref 38–116)
ALT SERPL W P-5'-P-CCNC: 16 U/L (ref 0–33)
ANION GAP SERPL CALCULATED.3IONS-SCNC: 11.5 MMOL/L (ref 5–15)
AST SERPL-CCNC: 17 U/L (ref 0–32)
BASOPHILS # BLD AUTO: 0.05 10*3/MM3 (ref 0–0.2)
BASOPHILS NFR BLD AUTO: 0.7 % (ref 0–1.5)
BILIRUB SERPL-MCNC: 0.4 MG/DL (ref 0.2–1.2)
BUN BLD-MCNC: 12 MG/DL (ref 6–20)
BUN/CREAT SERPL: 13 (ref 7.3–30)
CALCIUM SPEC-SCNC: 9.2 MG/DL (ref 8.5–10.2)
CHLORIDE SERPL-SCNC: 102 MMOL/L (ref 98–107)
CO2 SERPL-SCNC: 25.5 MMOL/L (ref 22–29)
CREAT BLD-MCNC: 0.92 MG/DL (ref 0.6–1.1)
DEPRECATED RDW RBC AUTO: 43.4 FL (ref 37–54)
EOSINOPHIL # BLD AUTO: 0.02 10*3/MM3 (ref 0–0.4)
EOSINOPHIL NFR BLD AUTO: 0.3 % (ref 0.3–6.2)
ERYTHROCYTE [DISTWIDTH] IN BLOOD BY AUTOMATED COUNT: 13 % (ref 12.3–15.4)
GFR SERPL CREATININE-BSD FRML MDRD: 79 ML/MIN/1.73
GLOBULIN UR ELPH-MCNC: 3.3 GM/DL (ref 1.8–3.5)
GLUCOSE BLD-MCNC: 105 MG/DL (ref 74–124)
HCT VFR BLD AUTO: 42.7 % (ref 34–46.6)
HGB BLD-MCNC: 13.9 G/DL (ref 12–15.9)
IMM GRANULOCYTES # BLD AUTO: 0.03 10*3/MM3 (ref 0–0.05)
IMM GRANULOCYTES NFR BLD AUTO: 0.4 % (ref 0–0.5)
LYMPHOCYTES # BLD AUTO: 2.18 10*3/MM3 (ref 0.7–3.1)
LYMPHOCYTES NFR BLD AUTO: 31.1 % (ref 19.6–45.3)
MCH RBC QN AUTO: 29.6 PG (ref 26.6–33)
MCHC RBC AUTO-ENTMCNC: 32.6 G/DL (ref 31.5–35.7)
MCV RBC AUTO: 90.9 FL (ref 79–97)
MONOCYTES # BLD AUTO: 0.5 10*3/MM3 (ref 0.1–0.9)
MONOCYTES NFR BLD AUTO: 7.1 % (ref 5–12)
NEUTROPHILS # BLD AUTO: 4.22 10*3/MM3 (ref 1.7–7)
NEUTROPHILS NFR BLD AUTO: 60.4 % (ref 42.7–76)
NRBC BLD AUTO-RTO: 0 /100 WBC (ref 0–0.2)
PLATELET # BLD AUTO: 224 10*3/MM3 (ref 140–450)
PMV BLD AUTO: 10.9 FL (ref 6–12)
POTASSIUM BLD-SCNC: 4.1 MMOL/L (ref 3.5–4.7)
PROT SERPL-MCNC: 7.3 G/DL (ref 6.3–8)
RBC # BLD AUTO: 4.7 10*6/MM3 (ref 3.77–5.28)
SODIUM BLD-SCNC: 139 MMOL/L (ref 134–145)
WBC NRBC COR # BLD: 7 10*3/MM3 (ref 3.4–10.8)

## 2020-02-25 PROCEDURE — 85025 COMPLETE CBC W/AUTO DIFF WBC: CPT

## 2020-02-25 PROCEDURE — 36415 COLL VENOUS BLD VENIPUNCTURE: CPT

## 2020-02-25 PROCEDURE — 80053 COMPREHEN METABOLIC PANEL: CPT

## 2020-02-25 PROCEDURE — 99214 OFFICE O/P EST MOD 30 MIN: CPT | Performed by: NURSE PRACTITIONER

## 2020-02-25 NOTE — PROGRESS NOTES
Subjective     REASON FOR CONSULTATION:   1.Left breast cancer T1cN 0- ER MS positive HER-2 negative low-grade infiltrating ductal carcinoma Oncotype score of 6 postmastectomy and sentinel node biopsy on 8/20/19  2.  Oncotype score of 6 tamoxifen prescribed                               REQUESTING PHYSICIAN:  MD Dannielle Campa MD    History of Present Illness patient is a 49-year-old premenopausal woman with a small hormone positive breast cancer which is low-grade with a low Oncotype score.  She has been on tamoxifen for about 3 months now and after the first month developed dizziness and somnolence and we asked her to cut back to 10 mg for a couple of weeks but she misunderstood and has stayed on 10 mg and is tolerating this fairly well overall.    When she was seen by Dr. Gooden and December 2019 she increased the tamoxifen back up to 20 mg and has been tolerating that well since that time.  She has noticed some increased tearfulness.  She does have upcoming nipple reconstruction and follows up with Dr. Levine tomorrow in preparation for this.  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    In the chest wall aside from noticing possible sutures that she had noticed previously.  She has no rashes or nodules noted.  She has no new areas of pain except for nerve like sensation that is intermittent under the left axilla.  She denies swelling, shortness of breath, or recent infections.                Past Medical History:   Diagnosis Date   • Breast cancer (CMS/[facility]) 05/2019    LEFT BREAST   • Fibrocystic breast    • History of palpitations    • PONV (postoperative nausea and vomiting)    • Seasonal allergies         Past Surgical History:   Procedure Laterality Date   •  BILATERAL BREAST REDUCTION Right 12/19/2019    Procedure: RIGHT REDUCTION FOR SYMMETRY;  Surgeon: PATRICIO Levine MD;  Location: [facility];  Service: Plastics   • BREAST BIOPSY     • BREAST RECONSTRUCTION Left 8/15/2019    Procedure: LEFT PLACEMENT TISSUE EXPANDER WITH ALLORDERM;  Surgeon: PATRICIO Levine MD;  Location: [facility];  Service: Plastics   • BREAST TISSUE EXPANDER REMOVAL INSERTION OF IMPLANT Bilateral 12/19/2019    Procedure: REMOVAL LEFT TISSUE EXPANDER AND PLACEMENT OF IMPLANT;  Surgeon: PATRICIO Levine MD;  Location: [facility];  Service: Plastics   • BUNIONECTOMY Bilateral     BUNIONECTOMY-2005 AND 2015   • DILATATION AND CURETTAGE  1998   • DILATATION AND CURETTAGE  1991    Vaginal warts   • HYSTERECTOMY  08/2008   • MASTECTOMY W/ SENTINEL NODE BIOPSY Left 8/15/2019    Procedure: LEFt total mastectomy, LEFT axilla sentinel node biopsy,  immediate recosntruction;  Surgeon: Sarah Barker MD;  Location: [facility];  Service: General   • WISDOM TOOTH EXTRACTION      ONC HISTTORY  patient is a 49-year-old -American female in excellent health on no chronic medications who was noted on her routine mammogram after 3-year break to have an abnormality in the left breast.  The patient thought she had some tenderness and showed this to her family doctor who sent her for a mammogram and this did show a definite difference from 3 years previously in that a12 mm abnormality was noted in her left breast at the 12:30 position.  Diagnostic mammogram showed in addition  that a to the lesion at 1230 there was another abnormality measuring 7 mm at the 1:00 region and several groups of indistinct calcifications were also noted in this area.  The ultrasound confirmed a solid mass measuring 11 x 10 at 1230 and a 4 x 4 millimeter mass at 4:00 in addition to 2 other cysts biopsy was done.on 6/26/2019 with the lesion at 12:00 showing atypical ductal hyperplasia measuring 1 mm negative for  invasive cancer in the lesion at 1230 showed invasive mammary carcinoma no special type low-grade measuring 9 mm associated with low-grade DCIS and microcalcifications tumor was 90% ER +94% MO positive HER-2 negative low Ki-67 of 5.7%  Patient underwent bilateral breast MRI on 2019 and this showed the biopsy-proven mass at the 12 o'clock position measuring 1.5 cm residual mass in the 3 o'clock position marker from the ADH showed no suspicious residual enhancement.  There is no obvious adenopathy in the right breast was benign    Because of the multifocal nature of the disease the patient opted for a left mastectomy with immediate reconstruction and the final pathology specimen showed a 12 x 12 mm low-grade invasive ductal carcinoma with 2- sentinel nodes.  There was associated DCIS measuring 8 x 6 mm and margins were all clear    Patient is  4 para 2 1 miscarriage and one  first childbirth was at age 23 she did not breast-feed she was on birth control for 1 year and has not had appeared since  when she had a hysterectomy for adenomyosis.  She is probably premenopausal as she has had no symptoms of menopause    Family history is completely negative for breast cancer ovarian cancer or any other malignancy that she is aware of father  at 60 of a heart attack mother 73 she has one brother who is healthy.    She is here today to discuss adjuvant treatment her Oncotype score came back at 6 Which I told her was excellent Which predicts no benefit from chemotherapy and distant recurrence at 9 years of 3% with hormonal blockade  We discussed tamoxifen as she is likely premenopausal and the side effect profile  The side effects and toxicities of Tamoxifen were discussed with the patient, including hot flashes, mood swings,depression, DVT and endometrial cancer. A list of drugs that interfere with the efficacy of tamoxifen and are to be avoided were given to the patient.  Estradiol FSH and LH levels  have been drawn today to ensure that she is premenopausal to confirm she is premenopausal  Discussed in detail the side effects of tamoxifen and the information from the Oncotype DX score and she is very happy to get away without chemotherapy and agreeable to tamoxifen but she will call me next week to check her menopausal status before taking it.  No radiation is planned    Current Outpatient Medications on File Prior to Visit   Medication Sig Dispense Refill   • Multiple Vitamin (MULTIVITAMIN) tablet Take 1 tablet by mouth Daily.     • tamoxifen (NOLVADEX) 20 MG chemo tablet Take 1 tablet by mouth Daily. 90 tablet 0   • [DISCONTINUED] loratadine (CLARITIN) 10 MG tablet Take 10 mg by mouth Daily.       No current facility-administered medications on file prior to visit.         ALLERGIES:    Allergies   Allergen Reactions   • Codeine Rash   • Percocet [Oxycodone-Acetaminophen] Itching        Social History     Socioeconomic History   • Marital status:      Spouse name: Not on file   • Number of children: Not on file   • Years of education: College   • Highest education level: Not on file   Occupational History   • Occupation:      Comment: [employer]   Tobacco Use   • Smoking status: Never Smoker   • Smokeless tobacco: Never Used   Substance and Sexual Activity   • Alcohol use: Yes     Frequency: Monthly or less     Drinks per session: 1 or 2     Binge frequency: Never     Comment: 3 PER MONTH-SOCIALLY   • Drug use: No   • Sexual activity: Yes     Birth control/protection: Surgical        Family History   Problem Relation Age of Onset   • Diabetes Mother    • Heart disease Mother    • Hypertension Mother    • Hyperlipidemia Mother    • Cancer Maternal Aunt         OF UNKNOWN ORGIN   • Diabetes Maternal Aunt    • Hyperlipidemia Maternal Aunt    • Breast cancer Paternal Aunt         PATERNAL GREAT AUNT-UNKNOLWN AGE    • Lung cancer Maternal Grandmother    • Brain cancer  "Maternal Grandmother    • Hypertension Father    • Heart attack Father    • Seizures Brother    • Malig Hyperthermia Neg Hx         Review of Systems   Constitutional: Negative.  Negative for fatigue.   Respiratory: Negative for chest tightness and shortness of breath.    Cardiovascular: Positive for chest pain (rib pain from expander 12/3/19). Negative for leg swelling.   Gastrointestinal: Negative for constipation, diarrhea, nausea and vomiting.   Musculoskeletal: Positive for myalgias (left breast tightness expander 12/3/19). Negative for joint swelling.   All other systems reviewed and are negative.       Objective     Vitals:    02/25/20 1013 02/25/20 1021   BP:  116/78   Pulse:  94   Resp:  16   Temp:  99.1 °F (37.3 °C)   TempSrc:  Oral   SpO2:  98%   Weight:  78.7 kg (173 lb 8 oz)   Height: 172.7 cm (67.99\") 172.7 cm (67.99\")   PainSc:  0-No pain     Current Status 2/25/2020   ECOG score 0       Physical Exam   GENERAL:  Well-developed, well-nourished in no acute distress.   SKIN:  Warm, dry without rashes, purpura or petechiae.  EYES:  Pupils equal, round and reactive to light.  EOMs intact.  Conjunctivae normal.  EARS:  Hearing intact.  NOSE:  Septum midline.  No excoriations or nasal discharge.  MOUTH:  Tongue is well-papillated; no stomatitis or ulcers.  Lips normal.  THROAT:  Oropharynx without lesions or exudates.  NECK:  Supple with good range of motion; no thyromegaly or masses, no JVD.  LYMPHATICS:  No cervical, supraclavicular, axillary adenopathy.  CHEST:  Lungs clear to auscultation. Good airflow.  BREASTS: Right breast is benign left implant in place with incisions healed.  CARDIAC:  Regular rate and rhythm without murmurs, rubs or gallops. Normal S1,S2.  ABDOMEN:  Soft, nontender with no hepatosplenomegaly or masses.  EXTREMITIES:  No clubbing, cyanosis or edema.  NEUROLOGICAL:  Cranial Nerves II-XII grossly intact.  No focal neurological deficits.  PSYCHIATRIC:  Normal affect and mood.  "       RECENT LABS:  Hematology WBC   Date Value Ref Range Status   02/25/2020 7.00 3.40 - 10.80 10*3/mm3 Final     RBC   Date Value Ref Range Status   02/25/2020 4.70 3.77 - 5.28 10*6/mm3 Final     Hemoglobin   Date Value Ref Range Status   02/25/2020 13.9 12.0 - 15.9 g/dL Final     Hematocrit   Date Value Ref Range Status   02/25/2020 42.7 34.0 - 46.6 % Final     Platelets   Date Value Ref Range Status   02/25/2020 224 140 - 450 10*3/mm3 Final            Pathology          Study Result     BILATERAL BREAST MRI WITHOUT AND WITH CONTRAST       IMPRESSION:  1. Biopsy-proven malignancy in the left breast at the 12 o'clock  position with the more posterior of the barrel-shaped metallic clip seen  centrally within the residual mass which measures on the order of 1.5 cm  in greatest dimension. The more anterior barrel-shaped metallic clips is  located within the hematoma. No other suspicious findings are seen  within the left breast and there is no evidence for left axillary  adenopathy.  2. The top hat shaped metallic clip at the 3 o'clock position  corresponds to the site of ADH and is not associated with any residual  suspicious enhancement. The metallic clip is seen within a dominant  hematoma cavity that measures on the order of 4 cm in greatest  dimension.  3. There are no findings suspicious for malignancy in the right breast.     BI-RADS Category 6: Known malignancy.     This report was finalized on 7/8/2019 11:51 AM by Dr. Harjinder Encinas M.D.             Assessment/Plan   1.  T1cN0 low-grade ER RI positive HER-2 negative infiltrating ductal carcinoma left breast with associated DCIS multifocal post mastectomy and sentinel node biopsy-Oncotype score of 6  · Tamoxifen prescribed in 9/19-patient cut to 10 mg after 1 month and stayed on this dose till 12/19 when escalated to 20 mg  · Patient continues the tamoxifen 20 mg daily.    2.   negative 9 gene invitae panel testing    3.  Increased tearfulness.  Patient is  not currently interested in an antidepressant though may be interested in support groups.  We will refer her to social work for evaluation who can refer her on to behavioral oncology if deemed necessary.    4.  Seasonal allergies.  Patient request prescription for Claritin-D as her primary care has retired and she is needing a new primary care.  We will refer her to the primary care referral line as well.    Plan  1.  Continue Tamoxifen 20 mg daily for at least 5 years with a switch to an AI when she is menopausal    2.  Follow-up with Dr. Gooden in 3 months.    3.  Discussed case with social work who will reach out to the patient via the telephone.    4.  60-day supply of Claritin-D sent to pharmacy.    5.  Primary care referral placed

## 2020-02-25 NOTE — PROGRESS NOTES
"FRANCIS had a lengthy phone call with the patient, after a request from Naa Ma to contact her about possible support groups.     Pt said that she has been \"wearing her emotions on her sleeve\" recently. She gets tearful or angry quickly. There are several reasons she can identify as contributing to this:  1) having a cancer diagnosis, 2) having survivor's guilt as she has several friends and coworkers who have been diagnosed with various cancers: one co-worker whose father was referred to [facility] yesterday, and 3) a disappointing relationship with a former boyfriend, relating to her diagnosis. She stated that she does not know \"how I am supposed to feel.\"  FRANCIS attempted to affirm her current feelings, and to offer resources that can help her work through them in the future.     Pt has been having problems with sleeping and night sweats since being on tamoxifen. She said when she is lying awake in bed, her mind races with the above issues. Sofy has suggested that she take the tamoxifen in the morning, rather than at bedtime like she had been doing.     FRANCIS talked with her about [employer], and the breast cancer groups they have. She is also interested in the [employer] program, although she said that she does not know how to swim and wonders if they would give her lessons. She will come by the office tomorrow to  literature for both programs.     FRANCIS also told her about the behavioral oncology program. She could be referred there in the future for possible medication, should the support groups not be sufficient.     FRANCIS commended her for her determination to work through these issues. She sounded alert and oriented x 3. Future social work assistance was offered as needed.  "

## 2020-03-23 ENCOUNTER — OFFICE VISIT (OUTPATIENT)
Dept: FAMILY MEDICINE CLINIC | Facility: CLINIC | Age: 50
End: 2020-03-23

## 2020-03-23 VITALS
TEMPERATURE: 98.1 F | HEIGHT: 68 IN | WEIGHT: 174.8 LBS | HEART RATE: 87 BPM | DIASTOLIC BLOOD PRESSURE: 72 MMHG | SYSTOLIC BLOOD PRESSURE: 118 MMHG | OXYGEN SATURATION: 98 % | BODY MASS INDEX: 26.49 KG/M2

## 2020-03-23 DIAGNOSIS — J30.1 SEASONAL ALLERGIC RHINITIS DUE TO POLLEN: ICD-10-CM

## 2020-03-23 DIAGNOSIS — Z90.711 HISTORY OF HYSTERECTOMY, SUPRACERVICAL: ICD-10-CM

## 2020-03-23 DIAGNOSIS — Z17.0 MALIGNANT NEOPLASM OF UPPER-OUTER QUADRANT OF LEFT BREAST IN FEMALE, ESTROGEN RECEPTOR POSITIVE (HCC): Primary | ICD-10-CM

## 2020-03-23 DIAGNOSIS — C50.412 MALIGNANT NEOPLASM OF UPPER-OUTER QUADRANT OF LEFT BREAST IN FEMALE, ESTROGEN RECEPTOR POSITIVE (HCC): Primary | ICD-10-CM

## 2020-03-23 DIAGNOSIS — Z00.00 HEALTHCARE MAINTENANCE: ICD-10-CM

## 2020-03-23 PROBLEM — Z82.49 FAMILY HISTORY OF PREMATURE CORONARY ARTERY DISEASE: Status: ACTIVE | Noted: 2019-04-05

## 2020-03-23 PROCEDURE — 99386 PREV VISIT NEW AGE 40-64: CPT | Performed by: FAMILY MEDICINE

## 2020-03-23 NOTE — PROGRESS NOTES
Subjective   Екатерина Strauss is a 49 y.o. female.     Chief Complaint   Patient presents with   • Establish Care   • Annual Exam       History of Present Illness       The following portions of the patient's history were reviewed and updated as appropriate: allergies, current medications, past family history, past medical history, past social history, past surgical history and problem list.    Past Medical History:   Diagnosis Date   • Breast cancer (CMS/HCC) 05/2019    LEFT BREAST   • Fibrocystic breast    • History of palpitations    • PONV (postoperative nausea and vomiting)    • Seasonal allergies        Past Surgical History:   Procedure Laterality Date   • BILATERAL BREAST REDUCTION Right 12/19/2019    Procedure: RIGHT REDUCTION FOR SYMMETRY;  Surgeon: PATRICIO Levine MD;  Location: [facility];  Service: Plastics   • BREAST BIOPSY     • BREAST RECONSTRUCTION Left 8/15/2019    Procedure: LEFT PLACEMENT TISSUE EXPANDER WITH ALLORDERM;  Surgeon: PATRICIO Levine MD;  Location: [facility];  Service: Plastics   • BREAST TISSUE EXPANDER REMOVAL INSERTION OF IMPLANT Bilateral 12/19/2019    Procedure: REMOVAL LEFT TISSUE EXPANDER AND PLACEMENT OF IMPLANT;  Surgeon: PATRICIO Levine MD;  Location: [facility];  Service: Plastics   • BUNIONECTOMY Bilateral     BUNIONECTOMY-2005 AND 2015   • DILATATION AND CURETTAGE  1998   • DILATATION AND CURETTAGE  1991    Vaginal warts   • HYSTERECTOMY  08/2008   • MASTECTOMY W/ SENTINEL NODE BIOPSY Left 8/15/2019    Procedure: LEFt total mastectomy, LEFT axilla sentinel node biopsy,  immediate recosntruction;  Surgeon: Sarah Barker MD;  Location: [facility];  Service: General   • WISDOM TOOTH EXTRACTION         Family History   Problem Relation Age of Onset   • Diabetes Mother    • Heart disease Mother    • Hypertension Mother    • Hyperlipidemia Mother    • Cancer Maternal Aunt         OF UNKNOWN ORGIN   • Diabetes Maternal Aunt    • Hyperlipidemia Maternal  "Aunt    • Breast cancer Paternal Aunt         PATERNAL GREAT AUNT-UNKNOLWN AGE    • Lung cancer Maternal Grandmother    • Brain cancer Maternal Grandmother    • Hypertension Father    • Heart attack Father    • Seizures Brother    • Malig Hyperthermia Neg Hx        Social History     Socioeconomic History   • Marital status:      Spouse name: Not on file   • Number of children: Not on file   • Years of education: College   • Highest education level: Not on file   Occupational History   • Occupation:      Comment: [employer]   Tobacco Use   • Smoking status: Never Smoker   • Smokeless tobacco: Never Used   Substance and Sexual Activity   • Alcohol use: Yes     Frequency: Monthly or less     Drinks per session: 1 or 2     Binge frequency: Never     Comment: 3 PER MONTH-SOCIALLY   • Drug use: No   • Sexual activity: Yes     Birth control/protection: Surgical       Review of Systems    Objective   Visit Vitals  Ht 172.7 cm (68\")   LMP  (LMP Unknown)   BMI 26.38 kg/m²     Body mass index is 26.38 kg/m².  Physical Exam      Assessment/Plan   Екатерина was seen today for establish care and annual exam.    Diagnoses and all orders for this visit:    Malignant neoplasm of upper-outer quadrant of left breast in female, estrogen receptor positive (CMS/HCC)    Seasonal allergic rhinitis due to pollen               "

## 2020-03-23 NOTE — PROGRESS NOTES
"Екатерина Strauss is here today for an annual physical exam.     Patient has a known history of breast cancer and has had a mastectomy and follows with oncology.  Is on tamoxifen.  Primary care doctor retired and she needed a new one.    Eating a healthy diet. Exercising routinely.   Postmenopausal. wears seat belt. Feels safe at home.   Sexual activity: no  Birth control: Has had a hysterectomy  Pregnancy:   (1  and 1 miscarriage)  Sexual and gender orientation: heterosexual female    PHQ-2 Depression Screening  Little interest or pleasure in doing things? 0   Feeling down, depressed, or hopeless? 0   PHQ-2 Total Score 0         I have reviewed the patient's medical, family, and social history in detail and updated the computerized patient record.    Screening history:  Colonoscopy - never  Mammogram- has mamm scheduled with surgeon soon,   Pap/pelvic -has had a hysterectomy, non-cancerous reasons  Metabolic -due    Health Maintenance   Topic Date Due   • ANNUAL PHYSICAL  1973   • HEPATITIS C SCREENING  2019   • PAP SMEAR  2019   • INFLUENZA VACCINE  2019   • TDAP/TD VACCINES (2 - Td) 2020       Review of Systems   Constitutional: Negative for fever.   HENT: Negative for hearing loss.    Eyes: Negative for visual disturbance.   Respiratory: Negative for shortness of breath.    Cardiovascular: Negative for chest pain.   Gastrointestinal: Negative for constipation and diarrhea.   Genitourinary: Negative for difficulty urinating.   Musculoskeletal: Negative for arthralgias and myalgias.   Skin: Negative for rash.   Hematological: Does not bruise/bleed easily.   Psychiatric/Behavioral: Negative for dysphoric mood.       /72   Pulse 87   Temp 98.1 °F (36.7 °C) (Oral)   Ht 172.7 cm (68\")   Wt 79.3 kg (174 lb 12.8 oz)   LMP  (LMP Unknown)   SpO2 98%   BMI 26.58 kg/m²      Physical Exam    Vital signs reviewed.  General appearance: No acute distress  Eyes: conjunctiva " clear without erythema; pupils equally round and reactive  ENT: external ears and nose normal; hearing normal, oropharynx clear  Neck: supple; no thyromegaly  CV: normal rate and rhythm; no peripheral edema  Respiratory: normal respiratory effort; lungs clear to auscultation bilaterally  MSK: normal gait and station; no focal joint deformity or swelling  Skin: no rash or wounds; normal turgor  Neuro: cranial nerves 2-12 grossly intact; normal sensation to light touch  Psych: mood and affect normal; recent and remote memory intact    No visits with results within 2 Week(s) from this visit.   Latest known visit with results is:   Lab on 02/25/2020   Component Date Value Ref Range Status   • Glucose 02/25/2020 105  74 - 124 mg/dL Final   • BUN 02/25/2020 12  6 - 20 mg/dL Final   • Creatinine 02/25/2020 0.92  0.60 - 1.10 mg/dL Final   • Sodium 02/25/2020 139  134 - 145 mmol/L Final   • Potassium 02/25/2020 4.1  3.5 - 4.7 mmol/L Final   • Chloride 02/25/2020 102  98 - 107 mmol/L Final   • CO2 02/25/2020 25.5  22.0 - 29.0 mmol/L Final   • Calcium 02/25/2020 9.2  8.5 - 10.2 mg/dL Final   • Total Protein 02/25/2020 7.3  6.3 - 8.0 g/dL Final   • Albumin 02/25/2020 4.00  3.50 - 5.20 g/dL Final   • ALT (SGPT) 02/25/2020 16  0 - 33 U/L Final   • AST (SGOT) 02/25/2020 17  0 - 32 U/L Final   • Alkaline Phosphatase 02/25/2020 47  38 - 116 U/L Final   • Total Bilirubin 02/25/2020 0.4  0.2 - 1.2 mg/dL Final   • eGFR   Amer 02/25/2020 79  >60 mL/min/1.73 Final   • Globulin 02/25/2020 3.3  1.8 - 3.5 gm/dL Final   • A/G Ratio 02/25/2020 1.2  1.1 - 2.4 g/dL Final   • BUN/Creatinine Ratio 02/25/2020 13.0  7.3 - 30.0 Final   • Anion Gap 02/25/2020 11.5  5.0 - 15.0 mmol/L Final   • WBC 02/25/2020 7.00  3.40 - 10.80 10*3/mm3 Final   • RBC 02/25/2020 4.70  3.77 - 5.28 10*6/mm3 Final   • Hemoglobin 02/25/2020 13.9  12.0 - 15.9 g/dL Final   • Hematocrit 02/25/2020 42.7  34.0 - 46.6 % Final   • MCV 02/25/2020 90.9  79.0 - 97.0 fL Final    • MCH 02/25/2020 29.6  26.6 - 33.0 pg Final   • MCHC 02/25/2020 32.6  31.5 - 35.7 g/dL Final   • RDW 02/25/2020 13.0  12.3 - 15.4 % Final   • RDW-SD 02/25/2020 43.4  37.0 - 54.0 fl Final   • MPV 02/25/2020 10.9  6.0 - 12.0 fL Final   • Platelets 02/25/2020 224  140 - 450 10*3/mm3 Final   • Neutrophil % 02/25/2020 60.4  42.7 - 76.0 % Final   • Lymphocyte % 02/25/2020 31.1  19.6 - 45.3 % Final   • Monocyte % 02/25/2020 7.1  5.0 - 12.0 % Final   • Eosinophil % 02/25/2020 0.3  0.3 - 6.2 % Final   • Basophil % 02/25/2020 0.7  0.0 - 1.5 % Final   • Immature Grans % 02/25/2020 0.4  0.0 - 0.5 % Final   • Neutrophils, Absolute 02/25/2020 4.22  1.70 - 7.00 10*3/mm3 Final   • Lymphocytes, Absolute 02/25/2020 2.18  0.70 - 3.10 10*3/mm3 Final   • Monocytes, Absolute 02/25/2020 0.50  0.10 - 0.90 10*3/mm3 Final   • Eosinophils, Absolute 02/25/2020 0.02  0.00 - 0.40 10*3/mm3 Final   • Basophils, Absolute 02/25/2020 0.05  0.00 - 0.20 10*3/mm3 Final   • Immature Grans, Absolute 02/25/2020 0.03  0.00 - 0.05 10*3/mm3 Final   • nRBC 02/25/2020 0.0  0.0 - 0.2 /100 WBC Final         Current Outpatient Medications:   •  loratadine-pseudoephedrine (CLARITIN-D 24 HOUR)  MG per 24 hr tablet, Take 1 tablet by mouth Daily., Disp: 30 tablet, Rfl: 1  •  Multiple Vitamin (MULTIVITAMIN) tablet, Take 1 tablet by mouth Daily., Disp: , Rfl:   •  tamoxifen (NOLVADEX) 20 MG chemo tablet, Take 1 tablet by mouth Daily., Disp: 90 tablet, Rfl: 0    Екатерина was seen today for establish care and annual exam.    Diagnoses and all orders for this visit:    Malignant neoplasm of upper-outer quadrant of left breast in female, estrogen receptor positive (CMS/HCC)    Seasonal allergic rhinitis due to pollen    Healthcare maintenance  -     Lipid Panel  -     Cologuard - Stool, Per Rectum; Future    History of hysterectomy, supracervical        Encourage healthy diet and exercise.  Encourage patient to stay up to date on screening examinations as indicated  based on age and risk factors. F/U yearly.

## 2020-03-24 LAB
CHOLEST SERPL-MCNC: 163 MG/DL (ref 100–199)
HDLC SERPL-MCNC: 74 MG/DL
LDLC SERPL CALC-MCNC: 68 MG/DL (ref 0–99)
TRIGL SERPL-MCNC: 106 MG/DL (ref 0–149)
VLDLC SERPL CALC-MCNC: 21 MG/DL (ref 5–40)

## 2020-04-27 RX ORDER — TAMOXIFEN CITRATE 20 MG/1
TABLET ORAL
Qty: 90 TABLET | Refills: 0 | Status: SHIPPED | OUTPATIENT
Start: 2020-04-27 | End: 2020-07-21

## 2020-05-14 NOTE — PROGRESS NOTES
Subjective     REASON FOR CONSULTATION:   1.Left breast cancer pT1cN 0- ER RI positive HER-2 negative low-grade infiltrating ductal carcinoma Oncotype score of 6 postmastectomy and sentinel node biopsy on 8/20/19  2.  Oncotype score of 6 tamoxifen prescribed                               REQUESTING PHYSICIAN:  MD Dannielle Campa MD    History of Present Illness patient is a 49-year-old premenopausal woman with a small hormone positive breast cancer which is low-grade with a low Oncotype score.  Of 6    She has been on tamoxifen now for 8 months and is settled into it without any undue problems currently.    Her mammogram is due in April but has been delayed to June because of the coronavirus pandemic    She has no other complaints currently    She had a hysterectomy and therefore does not need any Pap smears and should be scheduled for colonoscopy sometime soon  .  She tells me her insurance company does not pay for nurse practitioners and therefore we will have to make sure that she is not scheduled with 1 of them    Past Medical History:   Diagnosis Date   • Breast cancer (CMS/[facility]) 05/2019    LEFT BREAST   • Fibrocystic breast    • History of palpitations    • PONV (postoperative nausea and vomiting)    • Seasonal allergies         Past Surgical History:   Procedure Laterality Date   • BILATERAL BREAST REDUCTION Right 12/19/2019    Procedure: RIGHT REDUCTION FOR SYMMETRY;  Surgeon: PATRICIO Levine MD;  Location: [facility];  Service: Plastics   • BREAST BIOPSY     • BREAST RECONSTRUCTION Left 8/15/2019    Procedure: LEFT PLACEMENT TISSUE EXPANDER WITH ALLORDERM;  Surgeon: PATRICIO Levine MD;  Location: [facility];  Service: Plastics   • BREAST TISSUE EXPANDER REMOVAL INSERTION OF IMPLANT Bilateral 12/19/2019    Procedure: REMOVAL LEFT TISSUE EXPANDER AND PLACEMENT OF IMPLANT;  Surgeon: PATRICIO Levine MD;  Location: [facility] OR;   Service: Plastics   • BUNIONECTOMY Bilateral     BUNIONECTOMY-2005 AND 2015   • DILATATION AND CURETTAGE  1998   • DILATATION AND CURETTAGE  1991    Vaginal warts   • HYSTERECTOMY  08/2008   • MASTECTOMY W/ SENTINEL NODE BIOPSY Left 8/15/2019    Procedure: LEFt total mastectomy, LEFT axilla sentinel node biopsy,  immediate recosntruction;  Surgeon: Sarah Barker MD;  Location: [facility];  Service: General   • WISDOM TOOTH EXTRACTION      ONC HISTTORY  patient is a 49-year-old -American female in excellent health on no chronic medications who was noted on her routine mammogram after 3-year break to have an abnormality in the left breast.  The patient thought she had some tenderness and showed this to her family doctor who sent her for a mammogram and this did show a definite difference from 3 years previously in that a12 mm abnormality was noted in her left breast at the 12:30 position.  Diagnostic mammogram showed in addition  that a to the lesion at 1230 there was another abnormality measuring 7 mm at the 1:00 region and several groups of indistinct calcifications were also noted in this area.  The ultrasound confirmed a solid mass measuring 11 x 10 at 1230 and a 4 x 4 millimeter mass at 4:00 in addition to 2 other cysts biopsy was done.on 6/26/2019 with the lesion at 12:00 showing atypical ductal hyperplasia measuring 1 mm negative for invasive cancer in the lesion at 1230 showed invasive mammary carcinoma no special type low-grade measuring 9 mm associated with low-grade DCIS and microcalcifications tumor was 90% ER +94% NH positive HER-2 negative low Ki-67 of 5.7%  Patient underwent bilateral breast MRI on 7/8/2019 and this showed the biopsy-proven mass at the 12 o'clock position measuring 1.5 cm residual mass in the 3 o'clock position marker from the ADH showed no suspicious residual enhancement.  There is no obvious adenopathy in the right breast was benign    Because of the multifocal  nature of the disease the patient opted for a left mastectomy with immediate reconstruction and the final pathology specimen showed a 12 x 12 mm low-grade 1 invasive ductal carcinoma with 2-negative sentinel nodes.  There was associated DCIS measuring 8 x 6 mm and margins were all clear    Patient is  4 para 2 1 miscarriage and one  first childbirth was at age 23 she did not breast-feed she was on birth control for 1 year and has not had appeared since  when she had a hysterectomy for adenomyosis.  She is probably premenopausal as she has had no symptoms of menopause    Family history is completely negative for breast cancer ovarian cancer or any other malignancy that she is aware of father  at 60 of a heart attack mother 73 she has one brother who is healthy.    She is here today to discuss adjuvant treatment her Oncotype score came back at 6 Which I told her was excellent Which predicts no benefit from chemotherapy and distant recurrence at 9 years of 3% with hormonal blockade  We discussed tamoxifen as she is likely premenopausal and the side effect profile  The side effects and toxicities of Tamoxifen were discussed with the patient, including hot flashes, mood swings,depression, DVT and endometrial cancer. A list of drugs that interfere with the efficacy of tamoxifen and are to be avoided were given to the patient.  Estradiol FSH and LH levels have been drawn today to ensure that she is premenopausal to confirm she is premenopausal  Discussed in detail the side effects of tamoxifen and the information from the Oncotype DX score and she is very happy to get away without chemotherapy and agreeable to tamoxifen but she will call me next week to check her menopausal status before taking it.  No radiation is planned       She has been on tamoxifen for about 3 months now and after the first month developed dizziness and somnolence and we asked her to cut back to 10 mg for a couple of weeks  but she misunderstood and has stayed on 10 mg and is tolerating this fairly well overall.    When she was seen by Dr. Gooden and December 2019 she increased the tamoxifen back up to 20 mg and has been tolerating that well since that time.  She has noticed some increased tearfulness.  She does have upcoming nipple reconstruction and follows up with Dr. Levien tomorrow in preparation for this.  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        Current Outpatient Medications on File Prior to Visit   Medication Sig Dispense Refill   • Multiple Vitamin (MULTIVITAMIN) tablet Take 1 tablet by mouth Daily.     • tamoxifen (NOLVADEX) 20 MG chemo tablet TAKE 1 TABLET BY MOUTH EVERY DAY 90 tablet 0   • [DISCONTINUED] loratadine-pseudoephedrine (CLARITIN-D 24 HOUR)  MG per 24 hr tablet Take 1 tablet by mouth Daily. 30 tablet 1     No current facility-administered medications on file prior to visit.         ALLERGIES:    Allergies   Allergen Reactions   • Codeine Rash   • Percocet [Oxycodone-Acetaminophen] Itching        Social History     Socioeconomic History   • Marital status:      Spouse name: Not on file   • Number of children: Not on file   • Years of education: College   • Highest education level: Not on file   Occupational History   • Occupation:      Comment: [employer]   Tobacco Use   • Smoking status: Never Smoker   • Smokeless tobacco: Never Used   Substance and Sexual Activity   • Alcohol use: Yes     Frequency: Monthly or less     Drinks per session: 1 or 2     Binge frequency: Never     Comment: 3 PER MONTH-SOCIALLY   • Drug use: No   • Sexual activity: Yes     Birth control/protection: Surgical        Family History   Problem Relation Age of Onset   • Diabetes Mother    • Heart disease  "Mother    • Hypertension Mother    • Hyperlipidemia Mother    • Cancer Maternal Aunt         OF UNKNOWN ORGIN   • Diabetes Maternal Aunt    • Hyperlipidemia Maternal Aunt    • Breast cancer Paternal Aunt         PATERNAL GREAT AUNT-UNKNOLWN AGE    • Lung cancer Maternal Grandmother    • Brain cancer Maternal Grandmother    • Hypertension Father    • Heart attack Father    • Seizures Brother    • Malig Hyperthermia Neg Hx         Review of Systems   Constitutional: Negative.  Negative for fatigue.   Respiratory: Negative for chest tightness and shortness of breath.    Cardiovascular: Positive for chest pain (nerve like pain under left axilla at left breast still healing better 5/18/2020). Negative for leg swelling.   Gastrointestinal: Negative for constipation, diarrhea, nausea and vomiting.   Genitourinary: Negative for dysuria.   Musculoskeletal: Negative for joint swelling.   Skin: Negative for rash and wound.   Psychiatric/Behavioral: Positive for dysphoric mood (same 5/18/2020). The patient is not nervous/anxious (stable 5/18/2020).    All other systems reviewed and are negative.       Objective     Vitals:    05/18/20 1112   BP: 104/68   Pulse: 85   Resp: 16   Temp: 98.6 °F (37 °C)   SpO2: 98%   Weight: 79.4 kg (175 lb 1.6 oz)   Height: 172.7 cm (67.99\")   PainSc: 0-No pain     Current Status 5/18/2020   ECOG score 0       Physical Exam   GENERAL:  Well-developed, well-nourished in no acute distress.   SKIN:  Warm, dry without rashes, purpura or petechiae.  EYES:  Pupils equal, round and reactive to light.  EOMs intact.  Conjunctivae normal.  EARS:  Hearing intact.  NOSE:  Septum midline.  No excoriations or nasal discharge.  MOUTH:  Tongue is well-papillated; no stomatitis or ulcers.  Lips normal.  THROAT:  Oropharynx without lesions or exudates.  NECK:  Supple with good range of motion; no thyromegaly or masses, no JVD.  LYMPHATICS:  No cervical, supraclavicular, axillary adenopathy.  CHEST:  Lungs clear to " auscultation. Good airflow.  BREASTS: Right breast is benign left implant in place with incisions healed.  CARDIAC:  Regular rate and rhythm without murmurs, rubs or gallops. Normal S1,S2.  ABDOMEN:  Soft, nontender with no hepatosplenomegaly or masses.  EXTREMITIES:  No clubbing, cyanosis or edema.  NEUROLOGICAL:  Cranial Nerves II-XII grossly intact.  No focal neurological deficits.  PSYCHIATRIC:  Normal affect and mood.        RECENT LABS:  Hematology WBC   Date Value Ref Range Status   05/18/2020 8.44 3.40 - 10.80 10*3/mm3 Final     RBC   Date Value Ref Range Status   05/18/2020 4.95 3.77 - 5.28 10*6/mm3 Final     Hemoglobin   Date Value Ref Range Status   05/18/2020 14.5 12.0 - 15.9 g/dL Final     Hematocrit   Date Value Ref Range Status   05/18/2020 43.8 34.0 - 46.6 % Final     Platelets   Date Value Ref Range Status   05/18/2020 261 140 - 450 10*3/mm3 Final            Pathology          Study Result     BILATERAL BREAST MRI WITHOUT AND WITH CONTRAST       IMPRESSION:  1. Biopsy-proven malignancy in the left breast at the 12 o'clock  position with the more posterior of the barrel-shaped metallic clip seen  centrally within the residual mass which measures on the order of 1.5 cm  in greatest dimension. The more anterior barrel-shaped metallic clips is  located within the hematoma. No other suspicious findings are seen  within the left breast and there is no evidence for left axillary  adenopathy.  2. The top hat shaped metallic clip at the 3 o'clock position  corresponds to the site of ADH and is not associated with any residual  suspicious enhancement. The metallic clip is seen within a dominant  hematoma cavity that measures on the order of 4 cm in greatest  dimension.  3. There are no findings suspicious for malignancy in the right breast.     BI-RADS Category 6: Known malignancy.     This report was finalized on 7/8/2019 11:51 AM by Dr. Harjinder Encinas M.D.               Tissue Pathology Exam: [id number]      Final Diagnosis   1. Right Breast, Reduction Mammoplasty (78.5 grams):               A. Benign skin and breast tissue.               B. No atypical hyperplasia, in situ nor invasive carcinoma identified.     swm/pkm    Electronically signed by Suejy Baig MD on 12/20/2019 at 1504         Assessment/Plan   1.  T1cN0 low-grade ER FL positive HER-2 negative infiltrating ductal carcinoma left breast with associated DCIS multifocal post mastectomy and sentinel node biopsy-Oncotype score of 6  · Tamoxifen prescribed in 9/19-patient cut to 10 mg after 1 month and stayed on this dose till 12/19 when escalated to 20 mg  · Patient continues the tamoxifen 20 mg daily.    2.   negative 9 gene invitae panel testing    3.  Increased tearfulness.  Patient is not currently interested in an antidepressant though may be interested in support groups.  We will refer her to social work for evaluation who can refer her on to behavioral oncology if deemed necessary.    4.  Seasonal allergies.  Patient request prescription for Claritin-D as her primary care has retired and she is needing a new primary care.  We will refer her to the primary care referral line as well.    Plan  1.  Continue Tamoxifen 20 mg daily for at least 5 years with a switch to an AI when she is menopausal    2.  Follow-up with Dr. Gooden in 4 months.    3.  60-day supply of Claritin-D sent to pharmacy.    4.  Mammogram in June

## 2020-05-18 ENCOUNTER — LAB (OUTPATIENT)
Dept: OTHER | Facility: HOSPITAL | Age: 50
End: 2020-05-18

## 2020-05-18 ENCOUNTER — OFFICE VISIT (OUTPATIENT)
Dept: ONCOLOGY | Facility: CLINIC | Age: 50
End: 2020-05-18

## 2020-05-18 VITALS
HEART RATE: 85 BPM | OXYGEN SATURATION: 98 % | DIASTOLIC BLOOD PRESSURE: 68 MMHG | RESPIRATION RATE: 16 BRPM | TEMPERATURE: 98.6 F | SYSTOLIC BLOOD PRESSURE: 104 MMHG | WEIGHT: 175.1 LBS | BODY MASS INDEX: 26.54 KG/M2 | HEIGHT: 68 IN

## 2020-05-18 DIAGNOSIS — Z17.0 MALIGNANT NEOPLASM OF UPPER-OUTER QUADRANT OF LEFT BREAST IN FEMALE, ESTROGEN RECEPTOR POSITIVE (HCC): ICD-10-CM

## 2020-05-18 DIAGNOSIS — C50.412 MALIGNANT NEOPLASM OF UPPER-OUTER QUADRANT OF LEFT BREAST IN FEMALE, ESTROGEN RECEPTOR POSITIVE (HCC): ICD-10-CM

## 2020-05-18 DIAGNOSIS — Z17.0 MALIGNANT NEOPLASM OF UPPER-OUTER QUADRANT OF LEFT BREAST IN FEMALE, ESTROGEN RECEPTOR POSITIVE (HCC): Primary | ICD-10-CM

## 2020-05-18 DIAGNOSIS — C50.412 MALIGNANT NEOPLASM OF UPPER-OUTER QUADRANT OF LEFT BREAST IN FEMALE, ESTROGEN RECEPTOR POSITIVE (HCC): Primary | ICD-10-CM

## 2020-05-18 LAB
ALBUMIN SERPL-MCNC: 4.4 G/DL (ref 3.5–5.2)
ALBUMIN/GLOB SERPL: 1.2 G/DL
ALP SERPL-CCNC: 56 U/L (ref 39–117)
ALT SERPL W P-5'-P-CCNC: 20 U/L (ref 1–33)
ANION GAP SERPL CALCULATED.3IONS-SCNC: 10.4 MMOL/L (ref 5–15)
AST SERPL-CCNC: 19 U/L (ref 1–32)
BASOPHILS # BLD AUTO: 0.06 10*3/MM3 (ref 0–0.2)
BASOPHILS NFR BLD AUTO: 0.7 % (ref 0–1.5)
BILIRUB SERPL-MCNC: 0.4 MG/DL (ref 0.1–1.2)
BUN BLD-MCNC: 11 MG/DL (ref 6–20)
BUN/CREAT SERPL: 13.4 (ref 7–25)
CALCIUM SPEC-SCNC: 9 MG/DL (ref 8.6–10.5)
CHLORIDE SERPL-SCNC: 109 MMOL/L (ref 98–107)
CO2 SERPL-SCNC: 20.6 MMOL/L (ref 22–29)
CREAT BLD-MCNC: 0.82 MG/DL (ref 0.57–1)
DEPRECATED RDW RBC AUTO: 43.3 FL (ref 37–54)
EOSINOPHIL # BLD AUTO: 0.04 10*3/MM3 (ref 0–0.4)
EOSINOPHIL NFR BLD AUTO: 0.5 % (ref 0.3–6.2)
ERYTHROCYTE [DISTWIDTH] IN BLOOD BY AUTOMATED COUNT: 13.3 % (ref 12.3–15.4)
GFR SERPL CREATININE-BSD FRML MDRD: 89 ML/MIN/1.73
GLOBULIN UR ELPH-MCNC: 3.7 GM/DL
GLUCOSE BLD-MCNC: 113 MG/DL (ref 65–99)
HCT VFR BLD AUTO: 43.8 % (ref 34–46.6)
HGB BLD-MCNC: 14.5 G/DL (ref 12–15.9)
IMM GRANULOCYTES # BLD AUTO: 0.04 10*3/MM3 (ref 0–0.05)
IMM GRANULOCYTES NFR BLD AUTO: 0.5 % (ref 0–0.5)
LYMPHOCYTES # BLD AUTO: 2.62 10*3/MM3 (ref 0.7–3.1)
LYMPHOCYTES NFR BLD AUTO: 31 % (ref 19.6–45.3)
MCH RBC QN AUTO: 29.3 PG (ref 26.6–33)
MCHC RBC AUTO-ENTMCNC: 33.1 G/DL (ref 31.5–35.7)
MCV RBC AUTO: 88.5 FL (ref 79–97)
MONOCYTES # BLD AUTO: 0.63 10*3/MM3 (ref 0.1–0.9)
MONOCYTES NFR BLD AUTO: 7.5 % (ref 5–12)
NEUTROPHILS # BLD AUTO: 5.05 10*3/MM3 (ref 1.7–7)
NEUTROPHILS NFR BLD AUTO: 59.8 % (ref 42.7–76)
NRBC BLD AUTO-RTO: 0 /100 WBC (ref 0–0.2)
PLATELET # BLD AUTO: 261 10*3/MM3 (ref 140–450)
PMV BLD AUTO: 11.3 FL (ref 6–12)
POTASSIUM BLD-SCNC: 4.2 MMOL/L (ref 3.5–5.2)
PROT SERPL-MCNC: 8.1 G/DL (ref 6–8.5)
RBC # BLD AUTO: 4.95 10*6/MM3 (ref 3.77–5.28)
SODIUM BLD-SCNC: 140 MMOL/L (ref 136–145)
WBC NRBC COR # BLD: 8.44 10*3/MM3 (ref 3.4–10.8)

## 2020-05-18 PROCEDURE — 85025 COMPLETE CBC W/AUTO DIFF WBC: CPT | Performed by: INTERNAL MEDICINE

## 2020-05-18 PROCEDURE — 80053 COMPREHEN METABOLIC PANEL: CPT | Performed by: INTERNAL MEDICINE

## 2020-05-18 PROCEDURE — 36415 COLL VENOUS BLD VENIPUNCTURE: CPT

## 2020-05-18 PROCEDURE — 99213 OFFICE O/P EST LOW 20 MIN: CPT | Performed by: INTERNAL MEDICINE

## 2020-06-09 ENCOUNTER — APPOINTMENT (OUTPATIENT)
Dept: WOMENS IMAGING | Facility: HOSPITAL | Age: 50
End: 2020-06-09

## 2020-06-09 PROCEDURE — 77067 SCR MAMMO BI INCL CAD: CPT | Performed by: RADIOLOGY

## 2020-06-09 PROCEDURE — 77063 BREAST TOMOSYNTHESIS BI: CPT | Performed by: RADIOLOGY

## 2020-06-12 ENCOUNTER — TELEPHONE (OUTPATIENT)
Dept: SURGERY | Facility: CLINIC | Age: 50
End: 2020-06-12

## 2020-06-12 NOTE — TELEPHONE ENCOUNTER
[facility] right screening mammogram with 3D.  The breast is heterogenous Josesito dense.  Architectural distortion consistent with prior reduction mammoplasties BI-RADS 2

## 2020-06-16 ENCOUNTER — OFFICE VISIT (OUTPATIENT)
Dept: SURGERY | Facility: CLINIC | Age: 50
End: 2020-06-16

## 2020-06-16 ENCOUNTER — TELEPHONE (OUTPATIENT)
Dept: SURGERY | Facility: CLINIC | Age: 50
End: 2020-06-16

## 2020-06-16 VITALS
DIASTOLIC BLOOD PRESSURE: 78 MMHG | HEART RATE: 104 BPM | HEIGHT: 69 IN | WEIGHT: 179 LBS | BODY MASS INDEX: 26.51 KG/M2 | SYSTOLIC BLOOD PRESSURE: 118 MMHG | OXYGEN SATURATION: 98 %

## 2020-06-16 DIAGNOSIS — C50.412 CARCINOMA OF UPPER-OUTER QUADRANT OF LEFT BREAST IN FEMALE, ESTROGEN RECEPTOR POSITIVE (HCC): Primary | ICD-10-CM

## 2020-06-16 DIAGNOSIS — Z17.0 CARCINOMA OF UPPER-OUTER QUADRANT OF LEFT BREAST IN FEMALE, ESTROGEN RECEPTOR POSITIVE (HCC): Primary | ICD-10-CM

## 2020-06-16 DIAGNOSIS — R63.5 WEIGHT GAIN: ICD-10-CM

## 2020-06-16 DIAGNOSIS — R92.1 BREAST CALCIFICATIONS ON MAMMOGRAM: ICD-10-CM

## 2020-06-16 DIAGNOSIS — N60.92 ATYPICAL DUCTAL HYPERPLASIA OF LEFT BREAST: ICD-10-CM

## 2020-06-16 DIAGNOSIS — Z80.3 FH: BREAST CANCER: ICD-10-CM

## 2020-06-16 PROCEDURE — 99213 OFFICE O/P EST LOW 20 MIN: CPT | Performed by: SURGERY

## 2020-06-16 NOTE — PROGRESS NOTES
Chief Complaint: Екатерина Strauss is a 50 y.o. female who was seen in consultation at the request of  Jim Rodríguez II, MD   for newly diagnosed breast cancer and a followup visit    History of Present Illness:  Patient presents with newly diagnosed breast cancer, LEFT breast She noted no new masses, skin changes, nipple discharge, nipple changes prior to her most recent imaging.  Her most recent imaging includes the followin2019  [facility]   Mammo  Екатерина Strauss  Seen for yearly screening.  BILATERAL SCREENING MAMMOGRAM WITH TOMOSYNTHESIS  Heterogeneously dense.  Finding 1: Focal asymmetry 12 millimeters with multiple associated calcifications grouped distribution and architectural distortion middle one-third 12:30 left breast located 6 centimeters from the nipple. New since prior.  Finding 2: Stable area of asymmetric breast tissue posterior one-third lateral region of the right breast. No significant change.  IMPRESSION:  Finding 1: Left breast.  Finding 2: Benign-negative.  BIRADS Category 0    2019  [facility]   Radiology  Екатерина Strauss  LEFT DIAGNOSTIC MAMMOGRAM WITH TOMOSYNTHESIS  Heterogeneously dense.  Finding 1: Focal asymmetry in the left breast, 12:30. New focal asymmetry measuring 12 millimeters with multiple associated indistinct calcifications with grouped distribution and architectural distortion in the middle one-third 12:30 left breast located 4 centimeters from the nipple.  Finding 2: Several fine granular and indistinct calcifications measuring 7 millimeters middle one-third of the left breast at 12-1:00, central located 6 centimeters from the nipple. 4 cm inferior to the dominant finding described above.  Finding 3: Several groups of indistinct calcifications seen in the middle one-third of the left breast at 12-1:00, central. Flank the findings described above, spanning 6 to 7 cm in CC dimension.  LEFT BREAST ULTRASOUND  Finding 1:  Irregular solid mass with indistinct margins measuring 11 x 8 x 10 mm.  Finding 4: Oval very small complicated cyst 4 x 2 x 4 mm left breast at 4:00 located 3 centimeters from the nipple.  Finding 5: Oval very small simple cyst, 4 millimeters left breast at 11:00.  Finding 6: Oval complicated cyst 5 x 3 x 4 mm left breast at 12:00 located 2 centimeters from the nipple.  IMPRESSION:  Finding 1: Mass left breast is suspicious.  Finding 2: Calcifications left breast at 12-1:00.  Finding 3: Calcifications middle one-third of the left breast at 12-1:00, suspicious.  Finding 4: Complicated cyst breast at 4:00 located 3 centimeters from the nipple is suspicious.  Finding 5: Left breast at 11:00 benign-negative.  Finding 6: Complicated cyst in the left breast at 12:00 located 2 centimeters from the nipple is suspicious.  BIRADS Category 4C    07/08/2019  [facility]    Radiology  Екатерина Strauss  12:00 left breast middle third on 6 cm from the nipple metallic clip centrally located within an irregular mass 1.5 cm anterior to posterior, 0.8 cm superior to inferior 1.2 cm medial lateral. The metallic clip represents the most posterior of the 2 barrel-shaped metallic clips. The more anterior barrel-shaped clips is located within a post biopsy hematoma cavity.  3:00 middle third 5 cm from the nipple is a metallic clip within postbiopsy hematoma cavity. This represents the top hat shaped metallic clip. No suspicious residual enhancement. The hematoma measures 4.0 cm.  No other areas of abnormal enhancement left breast. No left axillary internal mammary chain adenopathy.  Scattered enhancement right breast. 0.8 cm oval circumscribed weakly enhancing mass consistent with a fibroadenoma retroareolar region of the right breast at the 6:00.      She had a biopsy on the following day that showed:     06/25/2019  [facility]   Radiology  Екатерина Strauss  LEFT UTLRASOUND GUIDED BIOPSY  12:00 position left  breast.  12-gauge. 20 cores. Minicork shaped s-anjum tissue marker was placed.  ADDENDUM:  Pathology is concordant.  That this mass was marked with two “barrel” shaped clip.  The mammogram demonstrated several similar groups of calcifications covering a length of approximately 8 cm. Recommend a breast MRI.  In addition the patient had a small nodule that appeared to possibly be a complicated cyst that was attempted to be aspirated but it would not aspirate. It had overall rather benign features. If it was thought to be helpful by biopsying any of the remaining groups of microcalcifcations or this small solid nodule if it would  we would be very happy to do so.  Please note that initially there was a small complex appearing nodule at the 12:00 position for which aspiration was recommended but this appeared to have been sampled with the stereotactic biopsy as it greatly diminished in size. The small nodule at the 4:00 position was solid and fairly benign in appearance and it was elected to do a six month follow up and again if it would  I would be happy to do a core biopsy of this.    06/25/2019  [facility]   Biopsy   Екатерина Strauss  Left breast at the 12:00. 12 core needle biopsy specimens were obtained using a 9-gauge vacuum assisted device. Top hat shaped s-anjum Eviva tissue marker was deployed.  Pathology calcifications at the 12-1:00 represents (ADH). This is concordant.  Ultrasound guided core biopsy of a mass at the 12:30. These areas are approximately 3 cm apart in the ML projection and 2 cm apart in the CC projection. The patient also had a complicated cyst at the 12:00 position was recommended for aspiration but prior to the attempted aspiration the cyst was no longer present and it was thought to have been removed with the stereotactic biopsy. There was a small complicated appearing cyst at the 4:00 position that was attempted to be aspirated but it now seemed  to be solid and was left for a follow up ultrasound in six months. I would recommend a breast MRI.    06/25/2019  [facility]     Pathology  Екатерина Strauss  DIAGNOSIS  1. Left Breast, 12-1:00, Core Biopsies:  - Single Focus of Atypical Ductal Hyperplasia (ADH), Solid and Cribriform Type, 0.1 cm in Dimension.  - Florid Hyperplasia of Usual Type, Columnar Cell Hyperplasia, Apocrine Change, and Fibrocystic Changes are Also Present.  - Microcalcifications Are Present in Fibroproliferative Changes and in ADH.  2. Left Breast, 12:30, Core Biopsies:  - Invasive Mammary Carcinoma, No Special Type (Ductal), Low Grade, Measuring at Least 0.9 cm in Dimension with Limited Associated Low Grade Cribriform Ductal Carcinoma in Situ (DCIS).  - Microcalcifications Are Present Within the Invasive Carcinoma and in Adjacent Benign Breast Parenchyma with Fibrocystic Changes.  Low grade morphology (tubules = 1, nuclear atypia = 2, and mitoses = 1).    06/25/2019  [facility]     Marker Analysis Екатерина Strauss   ER - 90.78%  ME - 94.21%  HER2 - 1+  Ki-67 - 5.7%    She has not had a breast biopsy in the past.  She has her ovaries, had her uterus removed for bleeding, and takes nor hormones.  Her family history includes the following:   She has 1 daughter, 1 son, no sisters, one paternal aunt, 3 maternal aunts.  She has a paternal great aunt who had breast cancer at uncertain age.    Pathology from her LEFT TM and SLNB with reconstruction returned as:  Invasive mammary carcinoma, no special type, 1.2 cm, low-grade, 1, 2, 1, no lymphovascular invasion, duct carcinoma in situ, 8 mm, low-grade, margins clear.  Closest margin is 9 mm to the invasive.  The second biopsy site is commented that there are fibrosis and biopsy site changes with no residual atypia or invasive carcinoma.0/2 nodes.  Pathologic stage T1c N0 stage Ia    SHe has had one drain removed.  She denies redness, warmth, drainage from incision.  Drain is serous, 20 cc per day.      Interval  history:  In the interim,  Екатерина Strauss  has done well.  Her Oncotype score September 2019 returned as 6.  She started tamoxifen in that same month.  She is tolerating that well other than a 10 pound weight gain.  She has noted no changes in her RIGHT breast exam. No new masses, skin changes, nipple changes, nipple discharge RIGHT breast.   She had her expander exchange for implant on the left in December 2019 with Dr. Levine.  She has noticed no changes in her left reconstructed breast.  No nodules or discolorations.  She is scheduled for her left reconstructed breast nipple areolar tattooing tomorrow with Dr. Gallardo.    She denies headache, bone pain, belly pain, cough, changes in vision or gait.    Her most recent imaging includes the following:  [facility] with [facility]D.  The breast is heterogenous Josesito dense.  Areas of asymmetric tissue in the right breast is benign BI-RADS 2    She is here to review.        Review of Systems:  Review of Systems   Constitutional: Positive for unexpected weight change (10 lb wt gain ).   All other systems reviewed and are negative.       Past Medical and Surgical History:  Breast Biopsy History:  Patient had not had a breast biopsy prior to her cancer diagnosis.  Breast Cancer HIstory:  Patient does not have a past medical history of breast cancer.  Breast Operations, and year:  NONE   Obstetric/Gynecologic History:  Age menstrual periods began: 10  Patient is postmenopausal due to removal of her uterus in the following year: 2008   Number of pregnancies: 4  Number of live births: 2  Number of abortions or miscarriages: 2  Age of delivery of first child: 23  Patient breast fed, for the following lenth of time: 3-4 MONTHS PER CHILD.   Length of time taking birth control pills: 20 YRS.   Patient has never taken hormone replacement  UTERUS REMOVED 2008.     Past Surgical History:   Procedure Laterality Date   • BILATERAL BREAST  REDUCTION Right 12/19/2019    Procedure: RIGHT REDUCTION FOR SYMMETRY;  Surgeon: PATRICIO Levine MD;  Location: [facility] OR;  Service: Plastics   • BREAST BIOPSY     • BREAST RECONSTRUCTION Left 8/15/2019    Procedure: LEFT PLACEMENT TISSUE EXPANDER WITH ALLORDERM;  Surgeon: PATRICIO Levine MD;  Location: [facility] OR;  Service: Plastics   • BREAST TISSUE EXPANDER REMOVAL INSERTION OF IMPLANT Bilateral 12/19/2019    Procedure: REMOVAL LEFT TISSUE EXPANDER AND PLACEMENT OF IMPLANT;  Surgeon: PATRICIO Levine MD;  Location: [facility] OR;  Service: Plastics   • BUNIONECTOMY Bilateral     BUNIONECTOMY-2005 AND 2015   • DILATATION AND CURETTAGE  1998   • DILATATION AND CURETTAGE  1991    Vaginal warts   • HYSTERECTOMY  08/2008   • MASTECTOMY W/ SENTINEL NODE BIOPSY Left 8/15/2019    Procedure: LEFt total mastectomy, LEFT axilla sentinel node biopsy,  immediate recosntruction;  Surgeon: Sarah Barker MD;  Location: [facility];  Service: General   • WISDOM TOOTH EXTRACTION         Past Medical History:   Diagnosis Date   • Breast cancer (CMS/HCC) 05/2019    LEFT BREAST   • Fibrocystic breast    • History of palpitations    • PONV (postoperative nausea and vomiting)    • Seasonal allergies        Prior Hospitalizations, other than for surgery or childbirth, and year:  NONE     Social History     Socioeconomic History   • Marital status:      Spouse name: Not on file   • Number of children: Not on file   • Years of education: College   • Highest education level: Not on file   Occupational History   • Occupation:      Comment: [employer]   Tobacco Use   • Smoking status: Never Smoker   • Smokeless tobacco: Never Used   Substance and Sexual Activity   • Alcohol use: Yes     Frequency: Monthly or less     Drinks per session: 1 or 2     Binge frequency: Never     Comment: 3 PER MONTH-SOCIALLY   • Drug use: No   • Sexual activity: Yes     Birth control/protection:  "Surgical     Patient is .  Patient is employed full time with the following occupation: CUSTOMER SERVICE  Patient drinks 2 servings of caffeine per day.    Family History:  Family History   Problem Relation Age of Onset   • Diabetes Mother    • Heart disease Mother    • Hypertension Mother    • Hyperlipidemia Mother    • Cancer Maternal Aunt         OF UNKNOWN ORGIN   • Diabetes Maternal Aunt    • Hyperlipidemia Maternal Aunt    • Breast cancer Paternal Aunt         PATERNAL GREAT AUNT-UNKNOLWN AGE    • Lung cancer Maternal Grandmother    • Brain cancer Maternal Grandmother    • Hypertension Father    • Heart attack Father    • Seizures Brother    • Malig Hyperthermia Neg Hx        Vital Signs:  /78   Pulse 104   Ht 174 cm (68.5\")   Wt 81.2 kg (179 lb)   LMP  (LMP Unknown)   SpO2 98%   Breastfeeding No   BMI 26.82 kg/m²      Medications:    Current Outpatient Medications:   •  loratadine-pseudoephedrine (Claritin-D 24 Hour)  MG per 24 hr tablet, Take 1 tablet by mouth Daily., Disp: 30 tablet, Rfl: 1  •  Multiple Vitamin (MULTIVITAMIN) tablet, Take 1 tablet by mouth Daily., Disp: , Rfl:   •  tamoxifen (NOLVADEX) 20 MG chemo tablet, TAKE 1 TABLET BY MOUTH EVERY DAY, Disp: 90 tablet, Rfl: 0     Allergies:  Allergies   Allergen Reactions   • Codeine Rash   • Percocet [Oxycodone-Acetaminophen] Itching       Physical Examination:  /78   Pulse 104   Ht 174 cm (68.5\")   Wt 81.2 kg (179 lb)   LMP  (LMP Unknown)   SpO2 98%   Breastfeeding No   BMI 26.82 kg/m²   General Appearance:  Patient is in no distress.  She is well kept and has an thin build.   Psychiatric:  Patient with appropriate mood and affect. Alert and oriented to self, time, and place.    Breast, RIGHT:  medium sized, 34D, symmetric with the contralateral surgically absent and reconstructed side in terms of size.  Breast skin is without erythema, edema, rashes.  There are no visible abnormalities upon inspection during the " arm-raising maneuver or with hands on hips in the sitting position. There is no nipple retraction, discharge or nipple/areolar skin changes.There are no masses palpable in the sitting or supine positions.  Well-healed reduction pattern incisions for her oncoplastic procedure for symmetry.  Her incisions have some degree of hypertrophy.    Breast, LEFT:  surgically absent with implant in place. Well healed transverse incision .  There are no nodules or discolorations on her skin.  There is a reconstructed nipple areolar complex with no tattooing.    Lymphatic:  There is no axillary, cervical, infraclavicular, or supraclavicular adenopathy bilaterally.  Eyes:  Pupils are round and reactive to light.  Cardiovascular:  Heart rate and rhythm are regular.  Respiratory:  Lungs are clear bilaterally with no crackles or wheezes in any lung field.  Gastrointestinal:  Abdomen is soft, nondistended, and nontender. There are no scars from previous surgery.    Musculoskeletal:  Good strength in all 4 extremities.   There is good range of motion in both shoulders.    Skin:  No new skin lesions or rashes on the skin excluding the breast (see breast exam above).        Imagin2016  [facility]   Mammo  Екатеринаnile Adamsrer  BILATERAL MAMMOGRAPHY  Heterogeneously dense.  Finding 1: Stable area of asymmetric breast tissue with associated punctate calcifications regional distribution superior region of the left breast. No significant change.  Finding 2: There stable areas of asymmetric breast tissue seen in both breasts.  BIRADS Category 2, Benign.    2019  [facility]   Mammo  Екатеринаnile Strauss  Seen for yearly screening.  BILATERAL SCREENING MAMMOGRAM WITH TOMOSYNTHESIS  Heterogeneously dense.  Finding 1: Focal asymmetry 12 millimeters with multiple associated calcifications grouped distribution and architectural distortion middle one-third 12:30 left breast located 6 centimeters from the nipple. New since  prior.  Finding 2: Stable area of asymmetric breast tissue posterior one-third lateral region of the right breast. No significant change.  IMPRESSION:  Finding 1: Left breast.  Finding 2: Benign-negative.  BIRADS Category 0    05/13/2019  [facility]   Radiology  Екатерина Strauss  LEFT DIAGNOSTIC MAMMOGRAM WITH TOMOSYNTHESIS  Heterogeneously dense.  Finding 1: Focal asymmetry in the left breast, 12:30. New focal asymmetry measuring 12 millimeters with multiple associated indistinct calcifications with grouped distribution and architectural distortion in the middle one-third 12:30 left breast located 4 centimeters from the nipple.  Finding 2: Several fine granular and indistinct calcifications measuring 7 millimeters middle one-third of the left breast at 12-1:00, central located 6 centimeters from the nipple. 4 cm inferior to the dominant finding described above.  Finding 3: Several groups of indistinct calcifications seen in the middle one-third of the left breast at 12-1:00, central. Flank the findings described above, spanning 6 to 7 cm in CC dimension.  LEFT BREAST ULTRASOUND  Finding 1: Irregular solid mass with indistinct margins measuring 11 x 8 x 10 mm.  Finding 4: Oval very small complicated cyst 4 x 2 x 4 mm left breast at 4:00 located 3 centimeters from the nipple.  Finding 5: Oval very small simple cyst, 4 millimeters left breast at 11:00.  Finding 6: Oval complicated cyst 5 x 3 x 4 mm left breast at 12:00 located 2 centimeters from the nipple.  IMPRESSION:  Finding 1: Mass left breast is suspicious.  Finding 2: Calcifications left breast at 12-1:00.  Finding 3: Calcifications middle one-third of the left breast at 12-1:00, suspicious.  Finding 4: Complicated cyst breast at 4:00 located 3 centimeters from the nipple is suspicious.  Finding 5: Left breast at 11:00 benign-negative.  Finding 6: Complicated cyst in the left breast at 12:00 located 2 centimeters from the nipple is  suspicious.  BIRADS Category 4C    07/08/2019  [facility]    Radiology  Екатерина Strauss  12:00 left breast middle third on 6 cm from the nipple metallic clip centrally located within an irregular mass 1.5 cm anterior to posterior, 0.8 cm superior to inferior 1.2 cm medial lateral. The metallic clip represents the most posterior of the 2 barrel-shaped metallic clips. The more anterior barrel-shaped clips is located within a post biopsy hematoma cavity.  3:00 middle third 5 cm from the nipple is a metallic clip within postbiopsy hematoma cavity. This represents the top hat shaped metallic clip. No suspicious residual enhancement. The hematoma measures 4.0 cm.  No other areas of abnormal enhancement left breast. No left axillary internal mammary chain adenopathy.  Scattered enhancement right breast. 0.8 cm oval circumscribed weakly enhancing mass consistent with a fibroadenoma retroareolar region of the right breast at the 6:00.    [facility] right [facility] with 3D.  The breast is heterogenous Josesito dense.  Areas of asymmetric tissue in the right breast is benign BI-RADS 2      Pathology:    06/25/2019  [facility]   Radiology  Екатерина Strauss  LEFT UTLRASOUND GUIDED BIOPSY  12:00 position left breast.  12-gauge. 20 cores. Minicork shaped s-anjum tissue marker was placed.  ADDENDUM:  Pathology is concordant.  That this mass was marked with two “barrel” shaped clip.  The mammogram demonstrated several similar groups of calcifications covering a length of approximately 8 cm. Recommend a breast MRI.  In addition the patient had a small nodule that appeared to possibly be a complicated cyst that was attempted to be aspirated but it would not aspirate. It had overall rather benign features. If it was thought to be helpful by biopsying any of the remaining groups of microcalcifcations or this small solid nodule if it would  we would be very happy to do  so.  Please note that initially there was a small complex appearing nodule at the 12:00 position for which aspiration was recommended but this appeared to have been sampled with the stereotactic biopsy as it greatly diminished in size. The small nodule at the 4:00 position was solid and fairly benign in appearance and it was elected to do a six month follow up and again if it would  I would be happy to do a core biopsy of this.    06/25/2019  [facility]   Biopsy   Екатерина Strauss  Left breast at the 12:00. 12 core needle biopsy specimens were obtained using a 9-gauge vacuum assisted device. Top hat shaped s-anjum Eviva tissue marker was deployed.  Pathology calcifications at the 12-1:00 represents (ADH). This is concordant.  Ultrasound guided core biopsy of a mass at the 12:30. These areas are approximately 3 cm apart in the ML projection and 2 cm apart in the CC projection. The patient also had a complicated cyst at the 12:00 position was recommended for aspiration but prior to the attempted aspiration the cyst was no longer present and it was thought to have been removed with the stereotactic biopsy. There was a small complicated appearing cyst at the 4:00 position that was attempted to be aspirated but it now seemed to be solid and was left for a follow up ultrasound in six months. I would recommend a breast MRI.    06/25/2019  [facility]     Pathology  Екатерина Strauss  DIAGNOSIS  3. Left Breast, 12-1:00, Core Biopsies:  - Single Focus of Atypical Ductal Hyperplasia (ADH), Solid and Cribriform Type, 0.1 cm in Dimension.  - Florid Hyperplasia of Usual Type, Columnar Cell Hyperplasia, Apocrine Change, and Fibrocystic Changes are Also Present.  - Microcalcifications Are Present in Fibroproliferative Changes and in ADH.  4. Left Breast, 12:30, Core Biopsies:  - Invasive Mammary Carcinoma, No Special Type (Ductal), Low Grade, Measuring at Least 0.9 cm in Dimension with Limited Associated Low Grade  Cribriform Ductal Carcinoma in Situ (DCIS).  - Microcalcifications Are Present Within the Invasive Carcinoma and in Adjacent Benign Breast Parenchyma with Fibrocystic Changes.  Low grade morphology (tubules = 1, nuclear atypia = 2, and mitoses = 1).    06/25/2019  [facility]     Marker Analysis Екатерина Strauss   ER - 90.78%  MD - 94.21%  HER2 - 1+  Ki-67 - 5.7%    Pathology from her LEFT TM and SLNB with reconstruction returned as:  Invasive mammary carcinoma, no special type, 1.2 cm, low-grade, 1, 2, 1, no lymphovascular invasion, duct carcinoma in situ, 8 mm, low-grade, margins clear.  Closest margin is 9 mm to the invasive.  The second biopsy site is commented that there are fibrosis and biopsy site changes with no residual atypia or invasive carcinoma.0/2 nodes.  Pathologic stage T1c N0 stage Ia               Final Diagnosis   1. Left Breast [place] Lymph Node #1:               A. One lymph node negative for metastatic carcinoma (0/1).     2. Left Breast [place] Lymph Node #2:               A. One lymph node negative for metastatic carcinoma (0/1).     3. Left Breast, Total Mastectomy:               A. Invasive ductal carcinoma, not otherwise specified.                            1. Invasive carcinoma measures 12 mm x 12 mm x 10 mm.                            2. Overall Geneva grade I (glandular score = 1, nuclear score = 2, mitotic score = 1).                            3. No lymphovascular space invasion identified.               B. Associated ductal carcinoma in situ (DCIS).                            1. Focus of DCIS spans 8 mm x 6 mm x  6mm.                            2. Low nuclear grade cribriform DCIS without necrosis.                           3.  DCIS measures at least 10 mm from all inked surgical margins.                  C. Invasive carcinoma measures 8.8 mm from the closest (Anterior) margin of excision.                   All other margins measure at least 35 mm from invasive carcinoma  including:                        Superior margin = 90 mm.                        Inferior margin = 150 mm.                        Posterior margin = 35 mm.                        Lateral margin = 100 mm.                        Medial margin = 100 mm.                  D. No pagetoid involvement of skin nor nipple identified.               E. Microcalcifications are identified within foci of invasive carcinoma, DCIS and benign breast tissue.               F. Nonneoplastic breast tissue with fibrocystic change, scattered adenosis, focal moderate to marked ductal                    hyperplasia of usual type and fibroadenomatous hyperplasia noted.               G. Gross and microscopic changes of three biopsy sites and clips noted.               H. Previously reported biomarkers: estrogen receptor: Positive (91%), progesterone receptor: Positive (94%),                    HER-2/artemio: Negative, Ki-67 = 5.7% ([id number]) (not reviewed).     Pathologic Stage: pT1c (sn) N0 (G1)  See Synoptic for tumor details.        jat/mariia    Electronically signed by Giacomo Contreras MD on 8/19/2019 at 1322   Synoptic Checklist   INVASIVE CARCINOMA OF THE BREAST   Breast Invasive - All Specimens          CLINICAL   Radiologic Finding   Mass or architectural distortion        Calcifications    SPECIMEN   Procedure   Total mastectomy    Specimen Laterality   Left    TUMOR   Tumor Site: Invasive Carcinoma   Upper outer quadrant    Clock Position of Tumor Site   1 o'clock        12 o'clock    Histologic Type   Invasive carcinoma of no special type (ductal, not otherwise specified)    Glandular (Acinar) / Tubular Differentiation   Score 1    Nuclear Pleomorphism   Score 2    Mitotic Rate   Score 1 (<=3 mitoses per mm2)    Number of Mitoses per 10 High-Power Fields   3 mitotic figures   Diameter of Microscope Field in Millimeters (mm)   0.55 Millimeters (mm)   Overall Grade   Grade 1 (scores of 3, 4 or 5)    Tumor Size   Greatest dimension of  largest invasive focus in Millimeters (mm): 12 Millimeters (mm)   Additional Dimension in Millimeters (mm)   12 Millimeters (mm)       10 Millimeters (mm)   Tumor Focality   Single focus of invasive carcinoma    Ductal Carcinoma In Situ (DCIS)   Present        Negative for extensive intraductal component (EIC)    Size (Extent) of DCIS   Estimated size of DCIS greatest dimension in Millimeters (mm) is at least: 8 Millimeters (mm)   Additional Dimension in Millimeters (mm)   6 Millimeters (mm)       6 Millimeters (mm)   Architectural Patterns   Cribriform    Nuclear Grade   Grade I (low)    Necrosis   Not identified    Lobular Carcinoma In Situ (LCIS)   No LCIS in specimen    Tumor Extent       Nipple DCIS   DCIS does not involve the nipple epidermis    Accessory Findings       Lymphovascular Invasion   Not identified    Dermal Lymphovascular Invasion   Not identified    Microcalcifications   Present in DCIS        Present in invasive carcinoma        Present in non-neoplastic tissue    Treatment Effect   No known presurgical therapy    MARGINS   Invasive Carcinoma Margins   Uninvolved by invasive carcinoma    Distance from Anterior Margin in Millimeters (mm)   8.8 Millimeters (mm)   Distance from Posterior Margin in Millimeters (mm)   35 Millimeters (mm)   Distance from Superior Margin in Millimeters (mm)   90 Millimeters (mm)   Distance from Inferior Margin in Millimeters (mm)   150 Millimeters (mm)   Distance from Medial Margin in Millimeters (mm)   100 Millimeters (mm)   Distance from Lateral Margin in Millimeters (mm)   100 Millimeters (mm)   Distance from Closest Margin in Millimeters (mm)   8.8 Millimeters (mm)   Closest Margin   Anterior    DCIS Margins   Uninvolved by DCIS    Distance of DCIS to Anterior Margin in Millimeters (mm)   10 Millimeters (mm)   Distance of DCIS to Posterior Margin in Millimeters (mm)   35 Millimeters (mm)   Distance of DCIS to Superior Margin in Millimeters (mm)   90 Millimeters  (mm)   Distance of DCIS to Inferior Margin in Millimeters (mm)   150 Millimeters (mm)   Distance of DCIS to Medial Margin in Millimeters (mm)   100 Millimeters (mm)   Distance of DCIS to Lateral Margin in Millimeters (mm)   100 Millimeters (mm)   Distance of DCIS from Closest Margin in Millimeters (mm)   10 Millimeters (mm)   Closest Margin   Anterior    LYMPH NODES   Regional Lymph Nodes   Uninvolved by tumor cells    Number of Lymph Nodes Examined   2    Number of [place] Nodes Examined   2    PATHOLOGIC STAGE CLASSIFICATION (pTNM, AJCC 8th Edition)   TNM Descriptors   Not applicable    Primary Tumor (Invasive Carcinoma) (pT)   pT1c    Regional Lymph Nodes (pN)       Modifier   (sn): Only sentinel node(s) evaluated.    Category (pN)   pN0    .      Comment     Immunostains for P63 were performed on blocks 3D and 3E utilizing appropriate controls.  Immunostains for CK5/6, estrogen receptors and progesterone receptors were performed on block 3D utilizing appropriate controls.  The invasive components lack an intact myoepithelial layer by P63 staining.  Rare foci suspicious for ductal carcinoma in situ (DCIS) showed intact myoepithelial layers by P63 staining, and lacked CK5/6 immunoreactivity. DCIS is strongly positive for Estrogen receptors (100%, Trevor 8) and Progesterone receptors (85%, Trevor 8).  The biopsy site changes grossly identified away from the primary mass lesion showed only stromal fibrosis, foreign body giant cell reaction and organizing fat necrosis consistent with previous biopsy site.  No in situ nor infiltrating carcinoma was identified associated with this more distant biopsy site.         Invitae panel 7-9-19 Breast and Gyn panel- VUS ion BARD1 c.2051A>C.     We will let her know no mutation found.    Procedures:      Assessment:   Diagnosis Plan   1. Carcinoma of upper-outer quadrant of left breast in female, estrogen receptor positive (CMS/HCC)     2. Atypical ductal hyperplasia of left  breast     3. Breast calcifications on mammogram     4. FH: breast cancer     5. Weight gain      1-3  LEFT breast biopsy sites on bedside ultrasound at the LEFT 12:00, 5 CFN location and at the LEFT 2:00, 4 CFN location.     LEFT breast 12:00, 5 CFN location- Initially asymetry and 1.5 cm calcifications on mammo, 1.1 cm mass on ultrasound.  X2- MRI enhancement at the more posterior barrel measuring 1.5 cm  IMC, NST, low grade, 1,2,1, 9mm, associated low grade DCIS,   ER91, SD 94, her 2 artemio 1+, ki 67 is 5.7%.    2-LEFT breast 2:00, 4 CFN location- calcifications  4 cm inferior to above- tophat marker--single focus ADH, solid and cribiform, 1mm, usual hyerplasia, CCC, apocrine chagnes, FCC    - Note additional calcs int he same region (absent on contralateral breast )spanning 8 cm total.    Clinical stage T1cN0- IA   (possibly multifocal, with at least atypia spanning 5 cm from clip to clip)    Pathology from her LEFT TM and SLNB with reconstruction returned as:  Invasive mammary carcinoma, no special type, 1.2 cm, low-grade, 1, 2, 1, no lymphovascular invasion, duct carcinoma in situ, 8 mm, low-grade, margins clear.  Closest margin is 9 mm to the invasive.  The second biopsy site is commented that there are fibrosis and biopsy site changes with no residual atypia or invasive carcinoma.0/2 nodes.  Pathologic stage T1c N0 stage Ia    Invitae panel 7-15-19 - VUS in BARD1 c.2051 A>C    September 2019 Oncotype score of 6.  Dr. Gooden started tamoxifen September 2019.  December 2 10,019 expander exchange for implant.    4-  PGA age uncertain    5-  10 pounds in [id number] after starting tamoxifen    Plan:  Екатерина,  and I reviewed her interval history, treatment, imaging, imaging reports, and exam together today.    She has a nice symmetry in terms of the size of her reconstructed and native right breast that is undergone mammoplasty.  She is scheduled for a left reconstructed breast nipple areolar tattooing  tomorrow with Dr. Gallardo.  She is stable on the tamoxifen and seeing Dr. Gooden every 3 to 4 months.  I did offer for her to talk with our nutritionist, as we discussed the negative prognostic effect of weight gain on breast cancer recurrence risk.  We will schedule that consultation for her.  [facility]  I gave her this reminder today.  I have not given her a routine follow-up in our office.  I did ask her to continue her self breast exam and have an annual clinical breast exam and annual imaging with a provider.      I asked her to call us in the future with any concerns and we would be happy to see her back.      Sarah Barker MD    15 min visit, 10 in face to face       Next Appointment:  Return for any future concerns.      EMR Dragon/transcription disclaimer:    Much of this encounter note is an electronic transcription/translocation of spoken language to printed text.  The electronic translation of spoken language may permit erroneous, or at times, nonsensical words or phrases to be inadvertently transcribed.  Although I have reviewed the note from such areas, some may still exist.

## 2020-06-16 NOTE — TELEPHONE ENCOUNTER
[facility] right screening mamm[facility] with 3D.  The breast is heterogenous Josesito dense.  Areas of asymmetric tissue in the right breast is benign BI-RADS 2

## 2020-07-21 RX ORDER — TAMOXIFEN CITRATE 20 MG/1
TABLET ORAL
Qty: 90 TABLET | Refills: 1 | Status: SHIPPED | OUTPATIENT
Start: 2020-07-21 | End: 2021-01-12

## 2020-08-10 NOTE — TELEPHONE ENCOUNTER
Approving 2 more months of CLaritin D. Note written to pharmacy that pt must get a PCP to prescribe it. (pt's PCP retired and hasn't found one yet according to appointment records)

## 2020-08-24 ENCOUNTER — TELEPHONE (OUTPATIENT)
Dept: ONCOLOGY | Facility: CLINIC | Age: 50
End: 2020-08-24

## 2020-08-24 NOTE — TELEPHONE ENCOUNTER
Patient has an appt on 09/21/20 with mirian robledo and patients insurance told patient that they will not cover patient seeing Np's. Patient needs to change provider to dr. Gooden .       Call patient back at [phone]

## 2020-10-06 NOTE — PROGRESS NOTES
Subjective     REASON FOR CONSULTATION:   1.Left breast cancer pT1cN 0- ER AR positive HER-2 negative low-grade infiltrating ductal carcinoma Oncotype score of 6 postmastectomy and sentinel node biopsy on 8/20/19  2.  Oncotype score of 6 tamoxifen prescribed                               REQUESTING PHYSICIAN:  MD Dannielle Campa MD    History of Present Illness patient is a 49-year-old premenopausal woman with a small hormone positive breast cancer which is low-grade with a low Oncotype score.  Of 6    She has been on tamoxifen now for 12 months and is settled into it without any undue problems currently.    Her mammogram  was done in June because of the coronavirus pandemic and thankfully benign on the right    She has no other complaints currently-she started taking a new supplement and this caused bloody stool and this brought up the fact that she never had a colonoscopy and we will refer her for colonoscopy    She had a hysterectomy and therefore does not need any Pap smears   .  She tells me her insurance company does not pay for nurse practitioners and therefore we will have to make sure that she is not scheduled with 1 of them    Past Medical History:   Diagnosis Date   • Breast cancer (CMS/HCC) 05/2019    LEFT BREAST   • Fibrocystic breast    • History of palpitations    • PONV (postoperative nausea and vomiting)    • Seasonal allergies         Past Surgical History:   Procedure Laterality Date   • BILATERAL BREAST REDUCTION Right 12/19/2019    Procedure: RIGHT REDUCTION FOR SYMMETRY;  Surgeon: PATRICIO Levine MD;  Location: [facility];  Service: Plastics   • BREAST BIOPSY     • BREAST RECONSTRUCTION Left 8/15/2019    Procedure: LEFT PLACEMENT TISSUE EXPANDER WITH ALLORDERM;  Surgeon: PATRICIO Levine MD;  Location: [facility];  Service: Plastics   • BREAST TISSUE EXPANDER REMOVAL INSERTION OF IMPLANT Bilateral 12/19/2019     Procedure: REMOVAL LEFT TISSUE EXPANDER AND PLACEMENT OF IMPLANT;  Surgeon: PATRICIO Levine MD;  Location: [facility];  Service: Plastics   • BUNIONECTOMY Bilateral     BUNIONECTOMY-2005 AND 2015   • DILATATION AND CURETTAGE  1998   • DILATATION AND CURETTAGE  1991    Vaginal warts   • HYSTERECTOMY  08/2008   • MASTECTOMY W/ SENTINEL NODE BIOPSY Left 8/15/2019    Procedure: LEFt total mastectomy, LEFT axilla sentinel node biopsy,  immediate recosntruction;  Surgeon: Sarah Barker MD;  Location: [facility];  Service: General   • WISDOM TOOTH EXTRACTION      ONC HISTTORY  patient is a 49-year-old -American female in excellent health on no chronic medications who was noted on her routine mammogram after 3-year break to have an abnormality in the left breast.  The patient thought she had some tenderness and showed this to her family doctor who sent her for a mammogram and this did show a definite difference from 3 years previously in that a12 mm abnormality was noted in her left breast at the 12:30 position.  Diagnostic mammogram showed in addition  that a to the lesion at 1230 there was another abnormality measuring 7 mm at the 1:00 region and several groups of indistinct calcifications were also noted in this area.  The ultrasound confirmed a solid mass measuring 11 x 10 at 1230 and a 4 x 4 millimeter mass at 4:00 in addition to 2 other cysts biopsy was done.on 6/26/2019 with the lesion at 12:00 showing atypical ductal hyperplasia measuring 1 mm negative for invasive cancer in the lesion at 1230 showed invasive mammary carcinoma no special type low-grade measuring 9 mm associated with low-grade DCIS and microcalcifications tumor was 90% ER +94% PA positive HER-2 negative low Ki-67 of 5.7%  Patient underwent bilateral breast MRI on 7/8/2019 and this showed the biopsy-proven mass at the 12 o'clock position measuring 1.5 cm residual mass in the 3 o'clock position marker from the ADH showed no  suspicious residual enhancement.  There is no obvious adenopathy in the right breast was benign    Because of the multifocal nature of the disease the patient opted for a left mastectomy with immediate reconstruction and the final pathology specimen showed a 12 x 12 mm low-grade 1 invasive ductal carcinoma with 2-negative sentinel nodes.  There was associated DCIS measuring 8 x 6 mm and margins were all clear    Patient is  4 para 2 1 miscarriage and one  first childbirth was at age 23 she did not breast-feed she was on birth control for 1 year and has not had appeared since  when she had a hysterectomy for adenomyosis.  She is probably premenopausal as she has had no symptoms of menopause    Family history is completely negative for breast cancer ovarian cancer or any other malignancy that she is aware of father  at 60 of a heart attack mother 73 she has one brother who is healthy.    She is here today to discuss adjuvant treatment her Oncotype score came back at 6 Which I told her was excellent Which predicts no benefit from chemotherapy and distant recurrence at 9 years of 3% with hormonal blockade  We discussed tamoxifen as she is likely premenopausal and the side effect profile  The side effects and toxicities of Tamoxifen were discussed with the patient, including hot flashes, mood swings,depression, DVT and endometrial cancer. A list of drugs that interfere with the efficacy of tamoxifen and are to be avoided were given to the patient.  Estradiol FSH and LH levels have been drawn today to ensure that she is premenopausal to confirm she is premenopausal  Discussed in detail the side effects of tamoxifen and the information from the Oncotype DX score and she is very happy to get away without chemotherapy and agreeable to tamoxifen but she will call me next week to check her menopausal status before taking it.  No radiation is planned       She has been on tamoxifen for about 3 months  now and after the first month developed dizziness and somnolence and we asked her to cut back to 10 mg for a couple of weeks but she misunderstood and has stayed on 10 mg and is tolerating this fairly well overall.    When she was seen by Dr. Gooden and December 2019 she increased the tamoxifen back up to 20 mg and has been tolerating that well since that time.  She has noticed some increased tearfulness.  She does have upcoming nipple reconstruction and follows up with Dr. Levine tomorrow in preparation for this.  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        Current Outpatient Medications on File Prior to Visit   Medication Sig Dispense Refill   • loratadine-pseudoephedrine (Claritin-D 24 Hour)  MG per 24 hr tablet Take 1 tablet by mouth Daily. 30 tablet 1   • Multiple Vitamin (MULTIVITAMIN) tablet Take 1 tablet by mouth Daily.     • tamoxifen (NOLVADEX) 20 MG chemo tablet TAKE 1 TABLET BY MOUTH EVERY DAY 90 tablet 1     No current facility-administered medications on file prior to visit.         ALLERGIES:    Allergies   Allergen Reactions   • Codeine Rash   • Percocet [Oxycodone-Acetaminophen] Itching        Social History     Socioeconomic History   • Marital status:      Spouse name: Not on file   • Number of children: Not on file   • Years of education: College   • Highest education level: Not on file   Occupational History   • Occupation:      Comment: [employer]   Tobacco Use   • Smoking status: Never Smoker   • Smokeless tobacco: Never Used   Substance and Sexual Activity   • Alcohol use: Yes     Frequency: Monthly or less     Drinks per session: 1 or 2     Binge frequency: Never     Comment: 3 PER MONTH-SOCIALLY   • Drug use: No   • Sexual activity: Yes     Birth  control/protection: Surgical        Family History   Problem Relation Age of Onset   • Diabetes Mother    • Heart disease Mother    • Hypertension Mother    • Hyperlipidemia Mother    • Cancer Maternal Aunt         OF UNKNOWN ORGIN   • Diabetes Maternal Aunt    • Hyperlipidemia Maternal Aunt    • Breast cancer Paternal Aunt         PATERNAL GREAT AUNT-UNKNOLWN AGE    • Lung cancer Maternal Grandmother    • Brain cancer Maternal Grandmother    • Hypertension Father    • Heart attack Father    • Seizures Brother    • Malig Hyperthermia Neg Hx         Review of Systems   Constitutional: Negative.  Negative for appetite change, chills, diaphoresis, fatigue, fever and unexpected weight change.   HENT: Negative for hearing loss, sore throat and trouble swallowing.    Respiratory: Negative for cough, chest tightness, shortness of breath and wheezing.    Cardiovascular: Positive for chest pain (nerve like pain under left axilla at left breast still healing better 5/18/2020). Negative for palpitations and leg swelling.   Gastrointestinal: Positive for constipation. Negative for abdominal distention, abdominal pain, diarrhea, nausea and vomiting.   Genitourinary: Negative for dysuria, frequency, hematuria and urgency.   Musculoskeletal: Negative for joint swelling.        No muscle weakness.   Skin: Negative for rash and wound.   Neurological: Negative for seizures, syncope, speech difficulty, weakness, numbness and headaches.   Hematological: Negative for adenopathy. Does not bruise/bleed easily.   Psychiatric/Behavioral: Negative for behavioral problems, confusion and suicidal ideas. The patient is not nervous/anxious.    All other systems reviewed and are negative.   I have reviewed and confirmed the accuracy of the ROS as documented by the MA/LPN/RN Tanner Gooden MD        Objective     Vitals:    10/07/20 0851   BP: 114/65   Pulse: 79   Resp: 16   Temp: 97.3 °F (36.3 °C)   TempSrc: Skin   SpO2: 100%   Weight: 80.9  "kg (178 lb 4.8 oz)   Height: 174 cm (68.5\")   PainSc: 0-No pain     Current Status 10/7/2020   ECOG score 0       Physical Exam   GENERAL:  Well-developed, well-nourished in no acute distress.   SKIN:  Warm, dry without rashes, purpura or petechiae.  EYES:  Pupils equal, round and reactive to light.  EOMs intact.  Conjunctivae normal.  EARS:  Hearing intact.  NOSE:  Septum midline.  No excoriations or nasal discharge.  MOUTH:  Tongue is well-papillated; no stomatitis or ulcers.  Lips normal.  THROAT:  Oropharynx without lesions or exudates.  NECK:  Supple with good range of motion; no thyromegaly or masses, no JVD.  LYMPHATICS:  No cervical, supraclavicular, axillary adenopathy.  CHEST:  Lungs clear to auscultation. Good airflow.  BREASTS: Right breast is benign left implant in place with incisions healed.  CARDIAC:  Regular rate and rhythm without murmurs, rubs or gallops. Normal S1,S2.  ABDOMEN:  Soft, nontender with no hepatosplenomegaly or masses.  EXTREMITIES:  No clubbing, cyanosis or edema.  NEUROLOGICAL:  Cranial Nerves II-XII grossly intact.  No focal neurological deficits.  PSYCHIATRIC:  Normal affect and mood.    I have reexamined the patient and the results are consistent with the previously documented exam. Tanner Gooden MD       RECENT LABS:  Hematology WBC   Date Value Ref Range Status   10/07/2020 7.56 3.40 - 10.80 10*3/mm3 Final     RBC   Date Value Ref Range Status   10/07/2020 4.85 3.77 - 5.28 10*6/mm3 Final     Hemoglobin   Date Value Ref Range Status   10/07/2020 14.3 12.0 - 15.9 g/dL Final     Hematocrit   Date Value Ref Range Status   10/07/2020 42.5 34.0 - 46.6 % Final     Platelets   Date Value Ref Range Status   10/07/2020 161 140 - 450 10*3/mm3 Final            Pathology          Study Result     BILATERAL BREAST MRI WITHOUT AND WITH CONTRAST       IMPRESSION:  1. Biopsy-proven malignancy in the left breast at the 12 o'clock  position with the more posterior of the barrel-shaped " metallic clip seen  centrally within the residual mass which measures on the order of 1.5 cm  in greatest dimension. The more anterior barrel-shaped metallic clips is  located within the hematoma. No other suspicious findings are seen  within the left breast and there is no evidence for left axillary  adenopathy.  2. The top hat shaped metallic clip at the 3 o'clock position  corresponds to the site of ADH and is not associated with any residual  suspicious enhancement. The metallic clip is seen within a dominant  hematoma cavity that measures on the order of 4 cm in greatest  dimension.  3. There are no findings suspicious for malignancy in the right breast.     BI-RADS Category 6: Known malignancy.     This report was finalized on 7/8/2019 11:51 AM by Dr. Harjinder Encinas M.D.               Tissue Pathology Exam: [id number]     Final Diagnosis   1. Right Breast, Reduction Mammoplasty (78.5 grams):               A. Benign skin and breast tissue.               B. No atypical hyperplasia, in situ nor invasive carcinoma identified.     swm/pkm    Electronically signed by Sujey Baig MD on 12/20/2019 at 1504               Assessment/Plan   1.  T1cN0 low-grade ER PA positive HER-2 negative infiltrating ductal carcinoma left breast with associated DCIS multifocal post mastectomy and sentinel node biopsy-Oncotype score of 6  · Tamoxifen prescribed in 9/19-patient cut to 10 mg after 1 month and stayed on this dose till 12/19 when escalated to 20 mg  · Patient continues the tamoxifen 20 mg daily.    2.   negative 9 gene invitae panel testing    3.  Increased tearfulness.  Improved    4.  Seasonal allergies    Plan  1.  Continue Tamoxifen 20 mg daily for at least 5 years with a switch to an AI when she is menopausal    2.  Follow-up with Dr. Gooden in 4 months.    3.  Stop supplements which are causing bloody stool and refer for colonoscopy     4.  Next mammogram due in June 2021

## 2020-10-07 ENCOUNTER — OFFICE VISIT (OUTPATIENT)
Dept: ONCOLOGY | Facility: CLINIC | Age: 50
End: 2020-10-07

## 2020-10-07 ENCOUNTER — LAB (OUTPATIENT)
Dept: OTHER | Facility: HOSPITAL | Age: 50
End: 2020-10-07

## 2020-10-07 VITALS
HEART RATE: 79 BPM | RESPIRATION RATE: 16 BRPM | OXYGEN SATURATION: 100 % | DIASTOLIC BLOOD PRESSURE: 65 MMHG | HEIGHT: 69 IN | TEMPERATURE: 97.3 F | SYSTOLIC BLOOD PRESSURE: 114 MMHG | BODY MASS INDEX: 26.41 KG/M2 | WEIGHT: 178.3 LBS

## 2020-10-07 DIAGNOSIS — C50.412 MALIGNANT NEOPLASM OF UPPER-OUTER QUADRANT OF LEFT BREAST IN FEMALE, ESTROGEN RECEPTOR POSITIVE (HCC): ICD-10-CM

## 2020-10-07 DIAGNOSIS — Z17.0 MALIGNANT NEOPLASM OF UPPER-OUTER QUADRANT OF LEFT BREAST IN FEMALE, ESTROGEN RECEPTOR POSITIVE (HCC): ICD-10-CM

## 2020-10-07 DIAGNOSIS — C50.412 MALIGNANT NEOPLASM OF UPPER-OUTER QUADRANT OF LEFT BREAST IN FEMALE, ESTROGEN RECEPTOR POSITIVE (HCC): Primary | ICD-10-CM

## 2020-10-07 DIAGNOSIS — Z17.0 MALIGNANT NEOPLASM OF UPPER-OUTER QUADRANT OF LEFT BREAST IN FEMALE, ESTROGEN RECEPTOR POSITIVE (HCC): Primary | ICD-10-CM

## 2020-10-07 LAB
BASOPHILS # BLD AUTO: 0.06 10*3/MM3 (ref 0–0.2)
BASOPHILS NFR BLD AUTO: 0.8 % (ref 0–1.5)
DEPRECATED RDW RBC AUTO: 41.7 FL (ref 37–54)
EOSINOPHIL # BLD AUTO: 0.04 10*3/MM3 (ref 0–0.4)
EOSINOPHIL NFR BLD AUTO: 0.5 % (ref 0.3–6.2)
ERYTHROCYTE [DISTWIDTH] IN BLOOD BY AUTOMATED COUNT: 13 % (ref 12.3–15.4)
HCT VFR BLD AUTO: 42.5 % (ref 34–46.6)
HGB BLD-MCNC: 14.3 G/DL (ref 12–15.9)
IMM GRANULOCYTES # BLD AUTO: 0.04 10*3/MM3 (ref 0–0.05)
IMM GRANULOCYTES NFR BLD AUTO: 0.5 % (ref 0–0.5)
LYMPHOCYTES # BLD AUTO: 2.65 10*3/MM3 (ref 0.7–3.1)
LYMPHOCYTES NFR BLD AUTO: 35.1 % (ref 19.6–45.3)
MCH RBC QN AUTO: 29.5 PG (ref 26.6–33)
MCHC RBC AUTO-ENTMCNC: 33.6 G/DL (ref 31.5–35.7)
MCV RBC AUTO: 87.6 FL (ref 79–97)
MONOCYTES # BLD AUTO: 0.58 10*3/MM3 (ref 0.1–0.9)
MONOCYTES NFR BLD AUTO: 7.7 % (ref 5–12)
NEUTROPHILS NFR BLD AUTO: 4.19 10*3/MM3 (ref 1.7–7)
NEUTROPHILS NFR BLD AUTO: 55.4 % (ref 42.7–76)
NRBC BLD AUTO-RTO: 0 /100 WBC (ref 0–0.2)
PLATELET # BLD AUTO: 161 10*3/MM3 (ref 140–450)
PMV BLD AUTO: 11.8 FL (ref 6–12)
RBC # BLD AUTO: 4.85 10*6/MM3 (ref 3.77–5.28)
WBC # BLD AUTO: 7.56 10*3/MM3 (ref 3.4–10.8)

## 2020-10-07 PROCEDURE — 36415 COLL VENOUS BLD VENIPUNCTURE: CPT

## 2020-10-07 PROCEDURE — 85025 COMPLETE CBC W/AUTO DIFF WBC: CPT | Performed by: INTERNAL MEDICINE

## 2020-10-07 PROCEDURE — 99213 OFFICE O/P EST LOW 20 MIN: CPT | Performed by: INTERNAL MEDICINE

## 2020-10-12 RX ORDER — LORATADINE AND PSEUDOEPHEDRINE SULFATE 10; 240 MG/1; MG/1
1 TABLET, EXTENDED RELEASE ORAL DAILY
Qty: 30 TABLET | Refills: 1 | OUTPATIENT
Start: 2020-10-12

## 2020-10-15 ENCOUNTER — PREP FOR SURGERY (OUTPATIENT)
Dept: GASTROENTEROLOGY | Facility: CLINIC | Age: 50
End: 2020-10-15

## 2020-10-15 DIAGNOSIS — Z12.11 ENCOUNTER FOR SCREENING FOR MALIGNANT NEOPLASM OF COLON: Primary | ICD-10-CM

## 2020-10-19 ENCOUNTER — TELEPHONE (OUTPATIENT)
Dept: PHARMACY | Facility: HOSPITAL | Age: 50
End: 2020-10-19

## 2020-10-19 RX ORDER — NICOTINE 14MG/24HR
PATCH, TRANSDERMAL 24 HOURS TRANSDERMAL
Qty: 30 TABLET | Refills: 1 | Status: SHIPPED | OUTPATIENT
Start: 2020-10-19 | End: 2020-11-30 | Stop reason: SDUPTHER

## 2020-10-19 NOTE — TELEPHONE ENCOUNTER
Had to leave a message stating that I was a submitting a request to refill her prescription ( Claritin-D) to Dr Gooden. However, since I don't know how long we will be able to continue to refill this medication, I gave her the number to call back here to triage so we could give her the referral line for primary care.

## 2020-10-20 ENCOUNTER — TELEPHONE (OUTPATIENT)
Dept: ONCOLOGY | Facility: HOSPITAL | Age: 50
End: 2020-10-20

## 2020-10-20 ENCOUNTER — TELEPHONE (OUTPATIENT)
Dept: ONCOLOGY | Facility: CLINIC | Age: 50
End: 2020-10-20

## 2020-10-20 NOTE — TELEPHONE ENCOUNTER
Pt called with new PCP info.  Chart updated to include Letty Minor MD as pt's PCP.  Pt also reports she will have this MD fill future Rx for Claritin-D prescription.

## 2020-11-30 RX ORDER — NICOTINE 14MG/24HR
1 PATCH, TRANSDERMAL 24 HOURS TRANSDERMAL DAILY
Qty: 30 TABLET | Refills: 2 | Status: SHIPPED | OUTPATIENT
Start: 2020-11-30 | End: 2021-03-25 | Stop reason: SDUPTHER

## 2020-12-02 ENCOUNTER — TELEPHONE (OUTPATIENT)
Dept: ONCOLOGY | Facility: CLINIC | Age: 50
End: 2020-12-02

## 2020-12-02 DIAGNOSIS — Z17.0 MALIGNANT NEOPLASM OF UPPER-OUTER QUADRANT OF LEFT BREAST IN FEMALE, ESTROGEN RECEPTOR POSITIVE (HCC): Primary | ICD-10-CM

## 2020-12-02 DIAGNOSIS — C50.412 MALIGNANT NEOPLASM OF UPPER-OUTER QUADRANT OF LEFT BREAST IN FEMALE, ESTROGEN RECEPTOR POSITIVE (HCC): Primary | ICD-10-CM

## 2020-12-02 NOTE — TELEPHONE ENCOUNTER
Екатерина's wanting to know if Cathi Hinson travels to the [facility] location. If not, she's asking for Dr. Gooden to refer her to someone else @ [facility]    Phone# [phone]

## 2020-12-11 ENCOUNTER — TELEPHONE (OUTPATIENT)
Dept: GASTROENTEROLOGY | Facility: CLINIC | Age: 50
End: 2020-12-11

## 2020-12-29 ENCOUNTER — TELEPHONE (OUTPATIENT)
Dept: GASTROENTEROLOGY | Facility: CLINIC | Age: 50
End: 2020-12-29

## 2020-12-29 ENCOUNTER — PREP FOR SURGERY (OUTPATIENT)
Dept: OTHER | Facility: HOSPITAL | Age: 50
End: 2020-12-29

## 2020-12-29 DIAGNOSIS — Z12.11 ENCOUNTER FOR SCREENING FOR MALIGNANT NEOPLASM OF COLON: Primary | ICD-10-CM

## 2020-12-29 NOTE — TELEPHONE ENCOUNTER
Screening colonoscopy-- no personal or family hx of polyps or colon ca-- no ASA or blood thinners-- medications:     loratadine-pseudoephedrine ([facility] Allergy Relief-D)  MG per 24 hr tablet      Multiple Vitamin (MULTIVITAMIN) tablet     tamoxifen (NOLVADEX) 20 MG chemo tablet          OA form scanned into media    Thank you

## 2021-01-12 RX ORDER — TAMOXIFEN CITRATE 20 MG/1
TABLET ORAL
Qty: 90 TABLET | Refills: 1 | Status: SHIPPED | OUTPATIENT
Start: 2021-01-12 | End: 2021-04-17 | Stop reason: HOSPADM

## 2021-01-21 PROBLEM — Z12.11 ENCOUNTER FOR SCREENING FOR MALIGNANT NEOPLASM OF COLON: Status: ACTIVE | Noted: 2021-01-21

## 2021-01-28 ENCOUNTER — LAB (OUTPATIENT)
Dept: LAB | Facility: HOSPITAL | Age: 51
End: 2021-01-28

## 2021-01-28 ENCOUNTER — OFFICE VISIT (OUTPATIENT)
Dept: ONCOLOGY | Facility: CLINIC | Age: 51
End: 2021-01-28

## 2021-01-28 VITALS
SYSTOLIC BLOOD PRESSURE: 114 MMHG | DIASTOLIC BLOOD PRESSURE: 75 MMHG | BODY MASS INDEX: 26.07 KG/M2 | WEIGHT: 176 LBS | HEIGHT: 69 IN | OXYGEN SATURATION: 98 % | TEMPERATURE: 98.4 F | HEART RATE: 89 BPM | RESPIRATION RATE: 16 BRPM

## 2021-01-28 DIAGNOSIS — C50.412 MALIGNANT NEOPLASM OF UPPER-OUTER QUADRANT OF LEFT BREAST IN FEMALE, ESTROGEN RECEPTOR POSITIVE (HCC): Primary | ICD-10-CM

## 2021-01-28 DIAGNOSIS — C50.412 MALIGNANT NEOPLASM OF UPPER-OUTER QUADRANT OF LEFT BREAST IN FEMALE, ESTROGEN RECEPTOR POSITIVE (HCC): ICD-10-CM

## 2021-01-28 DIAGNOSIS — Z17.0 MALIGNANT NEOPLASM OF UPPER-OUTER QUADRANT OF LEFT BREAST IN FEMALE, ESTROGEN RECEPTOR POSITIVE (HCC): Primary | ICD-10-CM

## 2021-01-28 DIAGNOSIS — Z17.0 MALIGNANT NEOPLASM OF UPPER-OUTER QUADRANT OF LEFT BREAST IN FEMALE, ESTROGEN RECEPTOR POSITIVE (HCC): ICD-10-CM

## 2021-01-28 LAB
ALBUMIN SERPL-MCNC: 4 G/DL (ref 3.5–5.2)
ALBUMIN/GLOB SERPL: 1.2 G/DL (ref 1.1–2.4)
ALP SERPL-CCNC: 52 U/L (ref 38–116)
ALT SERPL W P-5'-P-CCNC: 17 U/L (ref 0–33)
ANION GAP SERPL CALCULATED.3IONS-SCNC: 10.1 MMOL/L (ref 5–15)
AST SERPL-CCNC: 18 U/L (ref 0–32)
BASOPHILS # BLD AUTO: 0.05 10*3/MM3 (ref 0–0.2)
BASOPHILS NFR BLD AUTO: 0.8 % (ref 0–1.5)
BILIRUB SERPL-MCNC: 0.5 MG/DL (ref 0.2–1.2)
BUN SERPL-MCNC: 4 MG/DL (ref 6–20)
BUN/CREAT SERPL: 4.4 (ref 7.3–30)
CALCIUM SPEC-SCNC: 9.4 MG/DL (ref 8.5–10.2)
CHLORIDE SERPL-SCNC: 107 MMOL/L (ref 98–107)
CO2 SERPL-SCNC: 23.9 MMOL/L (ref 22–29)
CREAT SERPL-MCNC: 0.91 MG/DL (ref 0.6–1.1)
DEPRECATED RDW RBC AUTO: 41.3 FL (ref 37–54)
EOSINOPHIL # BLD AUTO: 0.03 10*3/MM3 (ref 0–0.4)
EOSINOPHIL NFR BLD AUTO: 0.5 % (ref 0.3–6.2)
ERYTHROCYTE [DISTWIDTH] IN BLOOD BY AUTOMATED COUNT: 12.8 % (ref 12.3–15.4)
ESTRADIOL SERPL HS-MCNC: 648 PG/ML
GFR SERPL CREATININE-BSD FRML MDRD: 79 ML/MIN/1.73
GLOBULIN UR ELPH-MCNC: 3.3 GM/DL (ref 1.8–3.5)
GLUCOSE SERPL-MCNC: 101 MG/DL (ref 74–124)
HCT VFR BLD AUTO: 39.9 % (ref 34–46.6)
HGB BLD-MCNC: 13.3 G/DL (ref 12–15.9)
IMM GRANULOCYTES # BLD AUTO: 0.02 10*3/MM3 (ref 0–0.05)
IMM GRANULOCYTES NFR BLD AUTO: 0.3 % (ref 0–0.5)
LYMPHOCYTES # BLD AUTO: 2.22 10*3/MM3 (ref 0.7–3.1)
LYMPHOCYTES NFR BLD AUTO: 35.9 % (ref 19.6–45.3)
MCH RBC QN AUTO: 29.4 PG (ref 26.6–33)
MCHC RBC AUTO-ENTMCNC: 33.3 G/DL (ref 31.5–35.7)
MCV RBC AUTO: 88.3 FL (ref 79–97)
MONOCYTES # BLD AUTO: 0.53 10*3/MM3 (ref 0.1–0.9)
MONOCYTES NFR BLD AUTO: 8.6 % (ref 5–12)
NEUTROPHILS NFR BLD AUTO: 3.34 10*3/MM3 (ref 1.7–7)
NEUTROPHILS NFR BLD AUTO: 53.9 % (ref 42.7–76)
NRBC BLD AUTO-RTO: 0 /100 WBC (ref 0–0.2)
PLATELET # BLD AUTO: 217 10*3/MM3 (ref 140–450)
PMV BLD AUTO: 10.6 FL (ref 6–12)
POTASSIUM SERPL-SCNC: 4.1 MMOL/L (ref 3.5–4.7)
PROT SERPL-MCNC: 7.3 G/DL (ref 6.3–8)
RBC # BLD AUTO: 4.52 10*6/MM3 (ref 3.77–5.28)
SODIUM SERPL-SCNC: 141 MMOL/L (ref 134–145)
WBC # BLD AUTO: 6.19 10*3/MM3 (ref 3.4–10.8)

## 2021-01-28 PROCEDURE — 99214 OFFICE O/P EST MOD 30 MIN: CPT | Performed by: INTERNAL MEDICINE

## 2021-01-28 PROCEDURE — 36415 COLL VENOUS BLD VENIPUNCTURE: CPT

## 2021-01-28 PROCEDURE — 80053 COMPREHEN METABOLIC PANEL: CPT

## 2021-01-28 PROCEDURE — 82670 ASSAY OF TOTAL ESTRADIOL: CPT | Performed by: INTERNAL MEDICINE

## 2021-01-28 PROCEDURE — 85025 COMPLETE CBC W/AUTO DIFF WBC: CPT

## 2021-01-28 NOTE — PROGRESS NOTES
Subjective     REASON FOR CONSULTATION:   1.Left breast cancer pT1cN 0- ER HI positive HER-2 negative low-grade infiltrating ductal carcinoma Oncotype score of 6 postmastectomy and sentinel node biopsy on 8/20/19  2.  Oncotype score of 6 tamoxifen prescribed                               REQUESTING PHYSICIAN:  MD Dannielle Campa MD    History of Present Illness patient is a 49-year-old premenopausal woman with a small hormone positive breast cancer which is low-grade with a low Oncotype score.  Of 6    She has been on tamoxifen now for 18 months and is settled into it without any undue problems currently.    Her mammogram  was done in June because of the coronavirus pandemic and thankfully benign on the right    she never had a colonoscopy and we will refer her for colonoscopy    She had a hysterectomy and therefore does not need any Pap smears   .        Past Medical History:   Diagnosis Date   • Breast cancer (CMS/HCC) 05/2019    LEFT BREAST   • Fibrocystic breast    • History of palpitations    • PONV (postoperative nausea and vomiting)    • Seasonal allergies         Past Surgical History:   Procedure Laterality Date   • BILATERAL BREAST REDUCTION Right 12/19/2019    Procedure: RIGHT REDUCTION FOR SYMMETRY;  Surgeon: PATRICIO Levine MD;  Location: [facility];  Service: Plastics   • BREAST BIOPSY     • BREAST RECONSTRUCTION Left 8/15/2019    Procedure: LEFT PLACEMENT TISSUE EXPANDER WITH ALLORDERM;  Surgeon: PATRICIO Levine MD;  Location: [facility];  Service: Plastics   • BREAST TISSUE EXPANDER REMOVAL INSERTION OF IMPLANT Bilateral 12/19/2019    Procedure: REMOVAL LEFT TISSUE EXPANDER AND PLACEMENT OF IMPLANT;  Surgeon: PATRICIO Levine MD;  Location: [facility];  Service: Plastics   • BUNIONECTOMY Bilateral     BUNIONECTOMY-2005 AND 2015   • DILATATION AND CURETTAGE  1998   • DILATATION AND CURETTAGE  1991    Vaginal warts    • HYSTERECTOMY  08/2008   • MASTECTOMY W/ SENTINEL NODE BIOPSY Left 8/15/2019    Procedure: LEFt total mastectomy, LEFT axilla sentinel node biopsy,  immediate recosntruction;  Surgeon: Sarah Barker MD;  Location: [facility] OR;  Service: General   • WISDOM TOOTH EXTRACTION      ONC HISTTORY  patient is a 49-year-old -American female in excellent health on no chronic medications who was noted on her routine mammogram after 3-year break to have an abnormality in the left breast.  The patient thought she had some tenderness and showed this to her family doctor who sent her for a mammogram and this did show a definite difference from 3 years previously in that a12 mm abnormality was noted in her left breast at the 12:30 position.  Diagnostic mammogram showed in addition  that a to the lesion at 1230 there was another abnormality measuring 7 mm at the 1:00 region and several groups of indistinct calcifications were also noted in this area.  The ultrasound confirmed a solid mass measuring 11 x 10 at 1230 and a 4 x 4 millimeter mass at 4:00 in addition to 2 other cysts biopsy was done.on 6/26/2019 with the lesion at 12:00 showing atypical ductal hyperplasia measuring 1 mm negative for invasive cancer in the lesion at 1230 showed invasive mammary carcinoma no special type low-grade measuring 9 mm associated with low-grade DCIS and microcalcifications tumor was 90% ER +94% GA positive HER-2 negative low Ki-67 of 5.7%  Patient underwent bilateral breast MRI on 7/8/2019 and this showed the biopsy-proven mass at the 12 o'clock position measuring 1.5 cm residual mass in the 3 o'clock position marker from the ADH showed no suspicious residual enhancement.  There is no obvious adenopathy in the right breast was benign    Because of the multifocal nature of the disease the patient opted for a left mastectomy with immediate reconstruction and the final pathology specimen showed a 12 x 12 mm low-grade 1 invasive  ductal carcinoma with 2-negative sentinel nodes.  There was associated DCIS measuring 8 x 6 mm and margins were all clear    Patient is  4 para 2 1 miscarriage and one  first childbirth was at age 23 she did not breast-feed she was on birth control for 1 year and has not had appeared since  when she had a hysterectomy for adenomyosis.  She is probably premenopausal as she has had no symptoms of menopause    Family history is completely negative for breast cancer ovarian cancer or any other malignancy that she is aware of father  at 60 of a heart attack mother 73 she has one brother who is healthy.    She is here today to discuss adjuvant treatment her Oncotype score came back at 6 Which I told her was excellent Which predicts no benefit from chemotherapy and distant recurrence at 9 years of 3% with hormonal blockade  We discussed tamoxifen as she is likely premenopausal and the side effect profile  The side effects and toxicities of Tamoxifen were discussed with the patient, including hot flashes, mood swings,depression, DVT and endometrial cancer. A list of drugs that interfere with the efficacy of tamoxifen and are to be avoided were given to the patient.  Estradiol FSH and LH levels have been drawn today to ensure that she is premenopausal to confirm she is premenopausal  Discussed in detail the side effects of tamoxifen and the information from the Oncotype DX score and she is very happy to get away without chemotherapy and agreeable to tamoxifen but she will call me next week to check her menopausal status before taking it.  No radiation is planned       She has been on tamoxifen for about 3 months now and after the first month developed dizziness and somnolence and we asked her to cut back to 10 mg for a couple of weeks but she misunderstood and has stayed on 10 mg and is tolerating this fairly well overall.    When she was seen by Dr. Gooden and 2019 she increased the  tamoxifen back up to 20 mg and has been tolerating that well since that time.  She has noticed some increased tearfulness.  She does have upcoming nipple reconstruction and follows up with Dr. Levine tomorrow in preparation for this.  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        Current Outpatient Medications on File Prior to Visit   Medication Sig Dispense Refill   • loratadine-pseudoephedrine ([facility] Allergy Relief-D)  MG per 24 hr tablet Take 1 tablet by mouth Daily. 30 tablet 2   • Multiple Vitamin (MULTIVITAMIN) tablet Take 1 tablet by mouth Daily.     • tamoxifen (NOLVADEX) 20 MG chemo tablet TAKE 1 TABLET BY MOUTH EVERY DAY 90 tablet 1     No current facility-administered medications on file prior to visit.         ALLERGIES:    Allergies   Allergen Reactions   • Codeine Rash   • Percocet [Oxycodone-Acetaminophen] Itching        Social History     Socioeconomic History   • Marital status:      Spouse name: Not on file   • Number of children: Not on file   • Years of education: College   • Highest education level: Not on file   Occupational History   • Occupation:      Comment: [employer]   Tobacco Use   • Smoking status: Never Smoker   • Smokeless tobacco: Never Used   Substance and Sexual Activity   • Alcohol use: Yes     Frequency: Monthly or less     Drinks per session: 1 or 2     Binge frequency: Never     Comment: 3 PER MONTH-SOCIALLY   • Drug use: No   • Sexual activity: Yes     Birth control/protection: Surgical        Family History   Problem Relation Age of Onset   • Diabetes Mother    • Heart disease Mother    • Hypertension Mother    • Hyperlipidemia Mother    • Cancer Maternal Aunt         OF UNKNOWN ORGIN   • Diabetes Maternal Aunt    • Hyperlipidemia Maternal Aunt    •  "Breast cancer Paternal Aunt         PATERNAL GREAT AUNT-UNKNOLWN AGE    • Lung cancer Maternal Grandmother    • Brain cancer Maternal Grandmother    • Hypertension Father    • Heart attack Father    • Seizures Brother    • Malig Hyperthermia Neg Hx         Review of Systems   Constitutional: Negative.  Negative for appetite change, chills, diaphoresis, fatigue, fever and unexpected weight change.   HENT: Negative for hearing loss, sore throat and trouble swallowing.    Respiratory: Negative for cough, chest tightness, shortness of breath and wheezing.    Cardiovascular: Positive for chest pain (nerve like pain under left axilla at left breast still healing better 5/18/2020). Negative for palpitations and leg swelling.   Gastrointestinal: Positive for constipation. Negative for abdominal distention, abdominal pain, diarrhea, nausea and vomiting.   Genitourinary: Negative for dysuria, frequency, hematuria and urgency.   Musculoskeletal: Negative for joint swelling.        No muscle weakness.   Skin: Negative for rash and wound.   Neurological: Negative for seizures, syncope, speech difficulty, weakness, numbness and headaches.   Hematological: Negative for adenopathy. Does not bruise/bleed easily.   Psychiatric/Behavioral: Negative for behavioral problems, confusion and suicidal ideas. The patient is not nervous/anxious.    All other systems reviewed and are negative.   I have reviewed and confirmed the accuracy of the ROS as documented by the MA/LPN/RN Tanner Gooden MD        Objective     Vitals:    01/28/21 0913   BP: 114/75   Pulse: 89   Resp: 16   Temp: 98.4 °F (36.9 °C)   TempSrc: Skin   SpO2: 98%   Weight: 79.8 kg (176 lb)   Height: 174 cm (68.5\")   PainSc: 0-No pain     Current Status 1/28/2021   ECOG score 0       Physical Exam   GENERAL:  Well-developed, well-nourished in no acute distress.   SKIN:  Warm, dry without rashes, purpura or petechiae.  EYES:  Pupils equal, round and reactive to light.  " EOMs intact.  Conjunctivae normal.  EARS:  Hearing intact.  NOSE:  Septum midline.  No excoriations or nasal discharge.  MOUTH:  Tongue is well-papillated; no stomatitis or ulcers.  Lips normal.  THROAT:  Oropharynx without lesions or exudates.  NECK:  Supple with good range of motion; no thyromegaly or masses, no JVD.  LYMPHATICS:  No cervical, supraclavicular, axillary adenopathy.  CHEST:  Lungs clear to auscultation. Good airflow.  BREASTS: Right breast is benign left implant in place with incisions healed.  CARDIAC:  Regular rate and rhythm without murmurs, rubs or gallops. Normal S1,S2.  ABDOMEN:  Soft, nontender with no hepatosplenomegaly or masses.  EXTREMITIES:  No clubbing, cyanosis or edema.  NEUROLOGICAL:  Cranial Nerves II-XII grossly intact.  No focal neurological deficits.  PSYCHIATRIC:  Normal affect and mood.    I have reexamined the patient and the results are consistent with the previously documented exam. Tanner Gooden MD       RECENT LABS:  Hematology WBC   Date Value Ref Range Status   01/28/2021 6.19 3.40 - 10.80 10*3/mm3 Final     RBC   Date Value Ref Range Status   01/28/2021 4.52 3.77 - 5.28 10*6/mm3 Final     Hemoglobin   Date Value Ref Range Status   01/28/2021 13.3 12.0 - 15.9 g/dL Final     Hematocrit   Date Value Ref Range Status   01/28/2021 39.9 34.0 - 46.6 % Final     Platelets   Date Value Ref Range Status   01/28/2021 217 140 - 450 10*3/mm3 Final            Pathology          Study Result     BILATERAL BREAST MRI WITHOUT AND WITH CONTRAST       IMPRESSION:  1. Biopsy-proven malignancy in the left breast at the 12 o'clock  position with the more posterior of the barrel-shaped metallic clip seen  centrally within the residual mass which measures on the order of 1.5 cm  in greatest dimension. The more anterior barrel-shaped metallic clips is  located within the hematoma. No other suspicious findings are seen  within the left breast and there is no evidence for left  axillary  adenopathy.  2. The top hat shaped metallic clip at the 3 o'clock position  corresponds to the site of ADH and is not associated with any residual  suspicious enhancement. The metallic clip is seen within a dominant  hematoma cavity that measures on the order of 4 cm in greatest  dimension.  3. There are no findings suspicious for malignancy in the right breast.     BI-RADS Category 6: Known malignancy.     This report was finalized on 7/8/2019 11:51 AM by Dr. Harjinder Encinas M.D.               Tissue Pathology Exam: [id number]     Final Diagnosis   1. Right Breast, Reduction Mammoplasty (78.5 grams):               A. Benign skin and breast tissue.               B. No atypical hyperplasia, in situ nor invasive carcinoma identified.     swm/pkm    Electronically signed by Sujey Baig MD on 12/20/2019 at 1504               Assessment/Plan   1.  T1cN0 low-grade ER MS positive HER-2 negative infiltrating ductal carcinoma left breast with associated DCIS multifocal post mastectomy and sentinel node biopsy-Oncotype score of 6  · Tamoxifen prescribed in 9/19-patient cut to 10 mg after 1 month and stayed on this dose till 12/19 when escalated to 20 mg  · Patient continues the tamoxifen 20 mg daily.    2.   negative 9 gene invitae panel testing    3.  Increased tearfulness.  Improved    4.?  Blood in stool needs colonoscopy    Plan  1.  Continue Tamoxifen 20 mg daily for at least 5 years with a switch to an AI when she is menopausal    2.  Follow-up with me  in 6 months.    3.  Check estradiol today    4.  Next mammogram due in June 2021      She tells me her insurance company does not pay for nurse practitioners and therefore we will have to make sure that she is not scheduled with 1 of them

## 2021-02-05 ENCOUNTER — OFFICE VISIT (OUTPATIENT)
Dept: FAMILY MEDICINE CLINIC | Facility: CLINIC | Age: 51
End: 2021-02-05

## 2021-02-05 VITALS
DIASTOLIC BLOOD PRESSURE: 60 MMHG | BODY MASS INDEX: 26.33 KG/M2 | OXYGEN SATURATION: 99 % | WEIGHT: 177.8 LBS | HEIGHT: 69 IN | HEART RATE: 93 BPM | TEMPERATURE: 98.6 F | SYSTOLIC BLOOD PRESSURE: 122 MMHG

## 2021-02-05 DIAGNOSIS — Z00.00 HEALTHCARE MAINTENANCE: Primary | ICD-10-CM

## 2021-02-05 DIAGNOSIS — Z11.59 ENCOUNTER FOR HEPATITIS C SCREENING TEST FOR LOW RISK PATIENT: ICD-10-CM

## 2021-02-05 PROCEDURE — 99396 PREV VISIT EST AGE 40-64: CPT | Performed by: FAMILY MEDICINE

## 2021-02-05 NOTE — PROGRESS NOTES
Екатерина Strauss is here today for an annual physical exam.     Patient has a known history of breast cancer and has had a single mastectomy and follows with oncology.  Is on tamoxifen.     Advised patient she was 1 month early for screening exam but patient wanted to continue. She checked with her insurance.     Eating a healthy diet. Exercising routinely.   Postmenopausal. wears seat belt. Feels safe at home.   Sexual activity: no  Birth control: Has had a hysterectomy including cervix per pt  Pregnancy:   (1  and 1 miscarriage)  Sexual and gender orientation: heterosexual female    PHQ-2 Depression Screening  Little interest or pleasure in doing things? 0   Feeling down, depressed, or hopeless? 0   PHQ-2 Total Score 0         I have reviewed the patient's medical, family, and social history in detail and updated the computerized patient record.    Screening history:  Colonoscopy - has scheduled next month with oncology.   Mammogram- has mamm scheduled with surgeon -,   Pap/pelvic -has had a hysterectomy, non-cancerous reasons  Metabolic -due    Health Maintenance   Topic Date Due   • COLONOSCOPY  1970   • HEPATITIS C SCREENING  2019   • TDAP/TD VACCINES (2 - Td) 2020   • ZOSTER VACCINE (1 of 2) 2020   • INFLUENZA VACCINE  2022 (Originally 2020)   • ANNUAL PHYSICAL  2021   • MAMMOGRAM  2022   • Pneumococcal Vaccine 0-64  Aged Out   • MENINGOCOCCAL VACCINE  Aged Out   • PAP SMEAR  Discontinued       Review of Systems   Constitutional: Negative for fever.   HENT: Negative for hearing loss.    Eyes: Negative for visual disturbance.   Respiratory: Negative for shortness of breath.    Cardiovascular: Negative for chest pain.   Gastrointestinal: Negative for constipation and diarrhea.   Genitourinary: Negative for difficulty urinating.   Musculoskeletal: Negative for arthralgias and myalgias.   Skin: Negative for rash.   Hematological: Does not  "bruise/bleed easily.   Psychiatric/Behavioral: Negative for dysphoric mood.       /60 (BP Location: Right arm, Patient Position: Sitting)   Pulse 93   Temp 98.6 °F (37 °C)   Ht 174 cm (68.5\")   Wt 80.6 kg (177 lb 12.8 oz)   LMP  (LMP Unknown)   SpO2 99%   BMI 26.64 kg/m²      Physical Exam    Vital signs reviewed.  General appearance: No acute distress  Eyes: conjunctiva clear without erythema; pupils equally round and reactive  ENT: external ears and nose normal; hearing normal, oropharynx clear  Neck: supple; no thyromegaly  CV: normal rate and rhythm; no peripheral edema  Respiratory: normal respiratory effort; lungs clear to auscultation bilaterally  MSK: normal gait and station; no focal joint deformity or swelling  Skin: no rash or wounds; normal turgor  Neuro: cranial nerves 2-12 grossly intact; normal sensation to light touch  Psych: mood and affect normal; recent and remote memory intact    Office Visit on 01/28/2021   Component Date Value Ref Range Status   • Estradiol 01/28/2021 648.0  pg/mL Final   Lab on 01/28/2021   Component Date Value Ref Range Status   • Glucose 01/28/2021 101  74 - 124 mg/dL Final   • BUN 01/28/2021 4* 6 - 20 mg/dL Final   • Creatinine 01/28/2021 0.91  0.60 - 1.10 mg/dL Final   • Sodium 01/28/2021 141  134 - 145 mmol/L Final   • Potassium 01/28/2021 4.1  3.5 - 4.7 mmol/L Final   • Chloride 01/28/2021 107  98 - 107 mmol/L Final   • CO2 01/28/2021 23.9  22.0 - 29.0 mmol/L Final   • Calcium 01/28/2021 9.4  8.5 - 10.2 mg/dL Final   • Total Protein 01/28/2021 7.3  6.3 - 8.0 g/dL Final   • Albumin 01/28/2021 4.00  3.50 - 5.20 g/dL Final   • ALT (SGPT) 01/28/2021 17  0 - 33 U/L Final   • AST (SGOT) 01/28/2021 18  0 - 32 U/L Final   • Alkaline Phosphatase 01/28/2021 52  38 - 116 U/L Final   • Total Bilirubin 01/28/2021 0.5  0.2 - 1.2 mg/dL Final   • eGFR   Amer 01/28/2021 79  >60 mL/min/1.73 Final   • Globulin 01/28/2021 3.3  1.8 - 3.5 gm/dL Final   • A/G Ratio " 01/28/2021 1.2  1.1 - 2.4 g/dL Final   • BUN/Creatinine Ratio 01/28/2021 4.4* 7.3 - 30.0 Final   • Anion Gap 01/28/2021 10.1  5.0 - 15.0 mmol/L Final   • WBC 01/28/2021 6.19  3.40 - 10.80 10*3/mm3 Final   • RBC 01/28/2021 4.52  3.77 - 5.28 10*6/mm3 Final   • Hemoglobin 01/28/2021 13.3  12.0 - 15.9 g/dL Final   • Hematocrit 01/28/2021 39.9  34.0 - 46.6 % Final   • MCV 01/28/2021 88.3  79.0 - 97.0 fL Final   • MCH 01/28/2021 29.4  26.6 - 33.0 pg Final   • MCHC 01/28/2021 33.3  31.5 - 35.7 g/dL Final   • RDW 01/28/2021 12.8  12.3 - 15.4 % Final   • RDW-SD 01/28/2021 41.3  37.0 - 54.0 fl Final   • MPV 01/28/2021 10.6  6.0 - 12.0 fL Final   • Platelets 01/28/2021 217  140 - 450 10*3/mm3 Final   • Neutrophil % 01/28/2021 53.9  42.7 - 76.0 % Final   • Lymphocyte % 01/28/2021 35.9  19.6 - 45.3 % Final   • Monocyte % 01/28/2021 8.6  5.0 - 12.0 % Final   • Eosinophil % 01/28/2021 0.5  0.3 - 6.2 % Final   • Basophil % 01/28/2021 0.8  0.0 - 1.5 % Final   • Immature Grans % 01/28/2021 0.3  0.0 - 0.5 % Final   • Neutrophils, Absolute 01/28/2021 3.34  1.70 - 7.00 10*3/mm3 Final   • Lymphocytes, Absolute 01/28/2021 2.22  0.70 - 3.10 10*3/mm3 Final   • Monocytes, Absolute 01/28/2021 0.53  0.10 - 0.90 10*3/mm3 Final   • Eosinophils, Absolute 01/28/2021 0.03  0.00 - 0.40 10*3/mm3 Final   • Basophils, Absolute 01/28/2021 0.05  0.00 - 0.20 10*3/mm3 Final   • Immature Grans, Absolute 01/28/2021 0.02  0.00 - 0.05 10*3/mm3 Final   • nRBC 01/28/2021 0.0  0.0 - 0.2 /100 WBC Final         Current Outpatient Medications:   •  loratadine-pseudoephedrine ([employer] Allergy Relief-D)  MG per 24 hr tablet, Take 1 tablet by mouth Daily., Disp: 30 tablet, Rfl: 2  •  Multiple Vitamin (MULTIVITAMIN) tablet, Take 1 tablet by mouth Daily., Disp: , Rfl:   •  tamoxifen (NOLVADEX) 20 MG chemo tablet, TAKE 1 TABLET BY MOUTH EVERY DAY, Disp: 90 tablet, Rfl: 1    Diagnoses and all orders for this visit:    1. Healthcare maintenance (Primary)  -     Lipid  Panel    2. Encounter for hepatitis C screening test for low risk patient  -     Hepatitis C Antibody        Encourage healthy diet and exercise.  Encourage patient to stay up to date on screening examinations as indicated based on age and risk factors. F/U yearly.

## 2021-02-06 LAB
CHOLEST SERPL-MCNC: 152 MG/DL (ref 0–200)
HCV AB S/CO SERPL IA: <0.1 S/CO RATIO (ref 0–0.9)
HDLC SERPL-MCNC: 73 MG/DL (ref 40–60)
LDLC SERPL CALC-MCNC: 70 MG/DL (ref 0–100)
TRIGL SERPL-MCNC: 40 MG/DL (ref 0–150)
VLDLC SERPL CALC-MCNC: 9 MG/DL (ref 5–40)

## 2021-03-02 ENCOUNTER — TRANSCRIBE ORDERS (OUTPATIENT)
Dept: SLEEP MEDICINE | Facility: HOSPITAL | Age: 51
End: 2021-03-02

## 2021-03-02 DIAGNOSIS — Z01.818 OTHER SPECIFIED PRE-OPERATIVE EXAMINATION: Primary | ICD-10-CM

## 2021-03-12 ENCOUNTER — LAB (OUTPATIENT)
Dept: LAB | Facility: HOSPITAL | Age: 51
End: 2021-03-12

## 2021-03-12 DIAGNOSIS — Z01.818 OTHER SPECIFIED PRE-OPERATIVE EXAMINATION: ICD-10-CM

## 2021-03-12 PROCEDURE — C9803 HOPD COVID-19 SPEC COLLECT: HCPCS

## 2021-03-12 PROCEDURE — U0004 COV-19 TEST NON-CDC HGH THRU: HCPCS

## 2021-03-13 LAB — SARS-COV-2 ORF1AB RESP QL NAA+PROBE: NOT DETECTED

## 2021-03-15 ENCOUNTER — ANESTHESIA EVENT (OUTPATIENT)
Dept: GASTROENTEROLOGY | Facility: HOSPITAL | Age: 51
End: 2021-03-15

## 2021-03-15 ENCOUNTER — HOSPITAL ENCOUNTER (OUTPATIENT)
Facility: HOSPITAL | Age: 51
Setting detail: HOSPITAL OUTPATIENT SURGERY
Discharge: HOME | End: 2021-03-15
Attending: INTERNAL MEDICINE | Admitting: INTERNAL MEDICINE

## 2021-03-15 ENCOUNTER — ANESTHESIA (OUTPATIENT)
Dept: GASTROENTEROLOGY | Facility: HOSPITAL | Age: 51
End: 2021-03-15

## 2021-03-15 VITALS
WEIGHT: 178.1 LBS | BODY MASS INDEX: 26.99 KG/M2 | HEART RATE: 87 BPM | RESPIRATION RATE: 14 BRPM | SYSTOLIC BLOOD PRESSURE: 115 MMHG | DIASTOLIC BLOOD PRESSURE: 77 MMHG | OXYGEN SATURATION: 100 % | HEIGHT: 68 IN

## 2021-03-15 PROCEDURE — S0260 H&P FOR SURGERY: HCPCS | Performed by: INTERNAL MEDICINE

## 2021-03-15 PROCEDURE — 25010000002 PROPOFOL 10 MG/ML EMULSION: Performed by: ANESTHESIOLOGY

## 2021-03-15 PROCEDURE — 45378 DIAGNOSTIC COLONOSCOPY: CPT | Performed by: INTERNAL MEDICINE

## 2021-03-15 RX ORDER — LIDOCAINE HYDROCHLORIDE 20 MG/ML
INJECTION, SOLUTION INFILTRATION; PERINEURAL AS NEEDED
Status: DISCONTINUED | OUTPATIENT
Start: 2021-03-15 | End: 2021-03-15 | Stop reason: SURG

## 2021-03-15 RX ORDER — SODIUM CHLORIDE, SODIUM LACTATE, POTASSIUM CHLORIDE, CALCIUM CHLORIDE 600; 310; 30; 20 MG/100ML; MG/100ML; MG/100ML; MG/100ML
30 INJECTION, SOLUTION INTRAVENOUS CONTINUOUS PRN
Status: DISCONTINUED | OUTPATIENT
Start: 2021-03-15 | End: 2021-03-15 | Stop reason: HOSPADM

## 2021-03-15 RX ORDER — PROPOFOL 10 MG/ML
VIAL (ML) INTRAVENOUS CONTINUOUS PRN
Status: DISCONTINUED | OUTPATIENT
Start: 2021-03-15 | End: 2021-03-15 | Stop reason: SURG

## 2021-03-15 RX ADMIN — PROPOFOL 300 MCG/KG/MIN: 10 INJECTION, EMULSION INTRAVENOUS at 14:37

## 2021-03-15 RX ADMIN — LIDOCAINE HYDROCHLORIDE 60 MG: 20 INJECTION, SOLUTION INFILTRATION; PERINEURAL at 14:38

## 2021-03-15 RX ADMIN — SODIUM CHLORIDE, POTASSIUM CHLORIDE, SODIUM LACTATE AND CALCIUM CHLORIDE 30 ML/HR: 600; 310; 30; 20 INJECTION, SOLUTION INTRAVENOUS at 14:32

## 2021-03-15 NOTE — ANESTHESIA PREPROCEDURE EVALUATION
Anesthesia Evaluation     Patient summary reviewed and Nursing notes reviewed   history of anesthetic complications: PONV               Airway   Mallampati: I  TM distance: >3 FB  Neck ROM: full  No difficulty expected  Dental - normal exam     Pulmonary - negative pulmonary ROS and normal exam   Cardiovascular - negative cardio ROS and normal exam        Neuro/Psych- negative ROS  GI/Hepatic/Renal/Endo - negative ROS     Musculoskeletal (-) negative ROS    Abdominal  - normal exam    Bowel sounds: normal.   Substance History - negative use     OB/GYN negative ob/gyn ROS         Other      history of cancer                    Anesthesia Plan    ASA 2     MAC       Anesthetic plan, all risks, benefits, and alternatives have been provided, discussed and informed consent has been obtained with: patient.

## 2021-03-15 NOTE — ANESTHESIA POSTPROCEDURE EVALUATION
"Patient: Екатерина Strauss    Procedure Summary     Date: 03/15/21 Room / Location:  [facility] /  [facility]    Anesthesia Start: 1435 Anesthesia Stop: 1500    Procedure: COLONOSCOPY TO CECUM AND TI (N/A ) Diagnosis:       Encounter for screening for malignant neoplasm of colon      (Encounter for screening for malignant neoplasm of colon [Z12.11])    Surgeons: Heather Montero MD Provider: Roland Booth MD    Anesthesia Type: MAC ASA Status: 2          Anesthesia Type: MAC    Vitals  Vitals Value Taken Time   BP 95/51 03/15/21 1511   Temp     Pulse 83 03/15/21 1511   Resp 14 03/15/21 1511   SpO2 99 % 03/15/21 1511           Post Anesthesia Care and Evaluation    Patient location during evaluation: PACU  Patient participation: complete - patient participated  Level of consciousness: awake  Pain score: 0  Pain management: adequate  Airway patency: patent  Anesthetic complications: No anesthetic complications  PONV Status: none  Cardiovascular status: acceptable  Respiratory status: acceptable  Hydration status: acceptable    Comments: BP 95/51 (BP Location: Right arm, Patient Position: Lying)   Pulse 83   Resp 14   Ht 172.7 cm (68\")   Wt 80.8 kg (178 lb 1.6 oz)   LMP  (LMP Unknown)   SpO2 99%   BMI 27.08 kg/m²       "

## 2021-03-15 NOTE — H&P
[facility]  Pre Procedure History & Physical    Chief Complaint:   screening    Subjective     HPI:   49 yo here today for initial colonoscopy.  Pt reports no FH CRC/polyps.  Patient denies GI symptoms currrently.      Past Medical History:   Past Medical History:   Diagnosis Date   • Breast cancer (CMS/HCC) 05/2019    LEFT BREAST   • Fibrocystic breast    • History of palpitations    • PONV (postoperative nausea and vomiting)    • Seasonal allergies        Past Surgical History:  Past Surgical History:   Procedure Laterality Date   • BILATERAL BREAST REDUCTION Right 12/19/2019    Procedure: RIGHT REDUCTION FOR SYMMETRY;  Surgeon: PATRICIO Levine MD;  Location: [facility];  Service: Plastics   • BREAST BIOPSY     • BREAST RECONSTRUCTION Left 8/15/2019    Procedure: LEFT PLACEMENT TISSUE EXPANDER WITH ALLORDERM;  Surgeon: PATRICIO Levine MD;  Location: [facility];  Service: Plastics   • BREAST TISSUE EXPANDER REMOVAL INSERTION OF IMPLANT Bilateral 12/19/2019    Procedure: REMOVAL LEFT TISSUE EXPANDER AND PLACEMENT OF IMPLANT;  Surgeon: PATRICIO Levine MD;  Location: [facility];  Service: Plastics   • BUNIONECTOMY Bilateral     BUNIONECTOMY-2005 AND 2015   • DILATATION AND CURETTAGE  1998   • DILATATION AND CURETTAGE  1991    Vaginal warts   • HYSTERECTOMY  08/2008   • MASTECTOMY W/ SENTINEL NODE BIOPSY Left 8/15/2019    Procedure: LEFt total mastectomy, LEFT axilla sentinel node biopsy,  immediate recosntruction;  Surgeon: Sarah Barker MD;  Location: [facility];  Service: General   • WISDOM TOOTH EXTRACTION         Family History:  Family History   Problem Relation Age of Onset   • Diabetes Mother    • Heart disease Mother    • Hypertension Mother    • Hyperlipidemia Mother    • Cancer Maternal Aunt         OF UNKNOWN ORGIN   • Diabetes Maternal Aunt    • Hyperlipidemia Maternal Aunt    • Breast cancer Paternal Aunt         PATERNAL GREAT AUNT-UNKNOLWN AGE    • Lung  "cancer Maternal Grandmother    • Brain cancer Maternal Grandmother    • Hypertension Father    • Heart attack Father    • Seizures Brother    • Malig Hyperthermia Neg Hx        Social History:   reports that she has never smoked. She has never used smokeless tobacco. She reports current alcohol use. She reports that she does not use drugs.    Medications:   Medications Prior to Admission   Medication Sig Dispense Refill Last Dose   • loratadine-pseudoephedrine ([facility] Allergy Relief-D)  MG per 24 hr tablet Take 1 tablet by mouth Daily. 30 tablet 2 Past Week at Unknown time   • Multiple Vitamin (MULTIVITAMIN) tablet Take 1 tablet by mouth Daily.   Past Week at Unknown time   • tamoxifen (NOLVADEX) 20 MG chemo tablet TAKE 1 TABLET BY MOUTH EVERY DAY 90 tablet 1 3/14/2021 at Unknown time       Allergies:  Codeine and Percocet [oxycodone-acetaminophen]    ROS:    Pertinent items are noted in HPI, all other systems reviewed and negative     Objective     Blood pressure 114/75, pulse 84, resp. rate 14, height 172.7 cm (68\"), weight 80.8 kg (178 lb 1.6 oz), SpO2 98 %, not currently breastfeeding.    Physical Exam   Constitutional: Pt is oriented to person, place, and time and well-developed, well-nourished, and in no distress.   Mouth/Throat: Oropharynx is clear and moist.   Neck: Normal range of motion.   Cardiovascular: Normal rate, regular rhythm    Pulmonary/Chest: Effort normal    Abdominal: Soft. Nontender  Skin: Skin is warm and dry.   Psychiatric: Mood, memory, affect and judgment normal.     Assessment/Plan     Diagnosis:  Screening for colon cancer    Anticipated Surgical Procedure:  colonoscopy    The risks, benefits, and alternatives of this procedure have been discussed with the patient or the responsible party- the patient understands and agrees to proceed.                                                              "

## 2021-03-24 ENCOUNTER — BULK ORDERING (OUTPATIENT)
Dept: CASE MANAGEMENT | Facility: OTHER | Age: 51
End: 2021-03-24

## 2021-03-24 DIAGNOSIS — Z23 IMMUNIZATION DUE: ICD-10-CM

## 2021-03-29 RX ORDER — NICOTINE 14MG/24HR
1 PATCH, TRANSDERMAL 24 HOURS TRANSDERMAL DAILY
Qty: 30 TABLET | Refills: 2 | Status: SHIPPED | OUTPATIENT
Start: 2021-03-29 | End: 2021-07-28 | Stop reason: SDUPTHER

## 2021-04-16 ENCOUNTER — HOSPITAL ENCOUNTER (OUTPATIENT)
Facility: HOSPITAL | Age: 51
Setting detail: OBSERVATION
Discharge: HOME | End: 2021-04-17
Attending: INTERNAL MEDICINE | Admitting: HOSPITALIST

## 2021-04-16 ENCOUNTER — APPOINTMENT (OUTPATIENT)
Dept: OTHER | Facility: HOSPITAL | Age: 51
End: 2021-04-16

## 2021-04-16 DIAGNOSIS — Z09 FOLLOW UP: ICD-10-CM

## 2021-04-16 DIAGNOSIS — D64.9 ANEMIA, UNSPECIFIED TYPE: Primary | ICD-10-CM

## 2021-04-16 PROBLEM — N83.202 LEFT OVARIAN CYST: Status: ACTIVE | Noted: 2021-04-16

## 2021-04-16 PROBLEM — R10.32 LEFT LOWER QUADRANT ABDOMINAL PAIN: Status: ACTIVE | Noted: 2021-04-16

## 2021-04-16 PROBLEM — K66.1 HEMOPERITONEUM, NONTRAUMATIC: Status: ACTIVE | Noted: 2021-04-16

## 2021-04-16 LAB
ANION GAP SERPL CALCULATED.3IONS-SCNC: 9.4 MMOL/L (ref 5–15)
BUN SERPL-MCNC: 8 MG/DL (ref 6–20)
BUN/CREAT SERPL: 11.9 (ref 7–25)
CALCIUM SPEC-SCNC: 8.8 MG/DL (ref 8.6–10.5)
CHLORIDE SERPL-SCNC: 106 MMOL/L (ref 98–107)
CO2 SERPL-SCNC: 24.6 MMOL/L (ref 22–29)
CREAT SERPL-MCNC: 0.67 MG/DL (ref 0.57–1)
DEPRECATED RDW RBC AUTO: 43.8 FL (ref 37–54)
ERYTHROCYTE [DISTWIDTH] IN BLOOD BY AUTOMATED COUNT: 13 % (ref 12.3–15.4)
GFR SERPL CREATININE-BSD FRML MDRD: 113 ML/MIN/1.73
GLUCOSE SERPL-MCNC: 111 MG/DL (ref 65–99)
HCT VFR BLD AUTO: 36.7 % (ref 34–46.6)
HGB BLD-MCNC: 11.8 G/DL (ref 12–15.9)
INR PPP: 1.17 (ref 0.9–1.1)
MCH RBC QN AUTO: 29.6 PG (ref 26.6–33)
MCHC RBC AUTO-ENTMCNC: 32.2 G/DL (ref 31.5–35.7)
MCV RBC AUTO: 92.2 FL (ref 79–97)
PLATELET # BLD AUTO: 218 10*3/MM3 (ref 140–450)
PMV BLD AUTO: 10.4 FL (ref 6–12)
POTASSIUM SERPL-SCNC: 3.7 MMOL/L (ref 3.5–5.2)
PROTHROMBIN TIME: 14.7 SECONDS (ref 11.7–14.2)
RBC # BLD AUTO: 3.98 10*6/MM3 (ref 3.77–5.28)
SODIUM SERPL-SCNC: 140 MMOL/L (ref 136–145)
WBC # BLD AUTO: 8.72 10*3/MM3 (ref 3.4–10.8)

## 2021-04-16 PROCEDURE — 85027 COMPLETE CBC AUTOMATED: CPT | Performed by: NURSE PRACTITIONER

## 2021-04-16 PROCEDURE — G0378 HOSPITAL OBSERVATION PER HR: HCPCS

## 2021-04-16 PROCEDURE — G0379 DIRECT REFER HOSPITAL OBSERV: HCPCS

## 2021-04-16 PROCEDURE — 80048 BASIC METABOLIC PNL TOTAL CA: CPT | Performed by: NURSE PRACTITIONER

## 2021-04-16 PROCEDURE — 85610 PROTHROMBIN TIME: CPT | Performed by: NURSE PRACTITIONER

## 2021-04-16 PROCEDURE — 99214 OFFICE O/P EST MOD 30 MIN: CPT | Performed by: INTERNAL MEDICINE

## 2021-04-16 RX ORDER — SODIUM CHLORIDE 0.9 % (FLUSH) 0.9 %
10 SYRINGE (ML) INJECTION EVERY 12 HOURS SCHEDULED
Status: DISCONTINUED | OUTPATIENT
Start: 2021-04-16 | End: 2021-04-17 | Stop reason: HOSPADM

## 2021-04-16 RX ORDER — SODIUM CHLORIDE 0.9 % (FLUSH) 0.9 %
10 SYRINGE (ML) INJECTION AS NEEDED
Status: DISCONTINUED | OUTPATIENT
Start: 2021-04-16 | End: 2021-04-17 | Stop reason: HOSPADM

## 2021-04-16 RX ORDER — ONDANSETRON 2 MG/ML
4 INJECTION INTRAMUSCULAR; INTRAVENOUS EVERY 6 HOURS PRN
Status: DISCONTINUED | OUTPATIENT
Start: 2021-04-16 | End: 2021-04-17 | Stop reason: HOSPADM

## 2021-04-16 RX ORDER — NAPROXEN 250 MG/1
250 TABLET ORAL 2 TIMES DAILY WITH MEALS
Status: DISCONTINUED | OUTPATIENT
Start: 2021-04-16 | End: 2021-04-17 | Stop reason: HOSPADM

## 2021-04-16 RX ORDER — ACETAMINOPHEN 325 MG/1
650 TABLET ORAL EVERY 4 HOURS PRN
Status: DISCONTINUED | OUTPATIENT
Start: 2021-04-16 | End: 2021-04-17 | Stop reason: HOSPADM

## 2021-04-16 RX ORDER — ONDANSETRON 4 MG/1
4 TABLET, FILM COATED ORAL EVERY 6 HOURS PRN
Status: DISCONTINUED | OUTPATIENT
Start: 2021-04-16 | End: 2021-04-17 | Stop reason: HOSPADM

## 2021-04-16 RX ADMIN — SODIUM CHLORIDE, PRESERVATIVE FREE 10 ML: 5 INJECTION INTRAVENOUS at 20:42

## 2021-04-16 RX ADMIN — NAPROXEN 250 MG: 250 TABLET ORAL at 20:41

## 2021-04-16 RX ADMIN — SODIUM CHLORIDE, PRESERVATIVE FREE 10 ML: 5 INJECTION INTRAVENOUS at 14:00

## 2021-04-16 NOTE — PLAN OF CARE
Goal Outcome Evaluation:        Outcome Summary: PT transferred from [facility]. Pt complained of abdominal pain. CT scan performed at [facility] and a 2.7 ovarian cyst was found. Possible rupture. Transferred here. OBGYN consulted. Ad nick. Regular diet. VSS. Will contiue to monitor.

## 2021-04-16 NOTE — CONSULTS
[facility] INITIAL INPATIENT CONSULTATION NOTE    REASON FOR CONSULTATION:    History of breast cancer, on tamoxifen  Ruptured ovarian cyst      HISTORY OF PRESENT ILLNESS:  Екатерина Strauss is a 50 y.o. female who we are asked to see today in consultation for history of breast cancer with newly diagnosed ruptured ovarian cyst.    She has been on tamoxifen since September 2019 and on a full dose of 20 mg daily since December 2019.  She has been tolerating this very well.  She follows up with Dr. Gooden every 6 months.    She presented with acute abdominal pain to the [facility] last night.  Hemoglobin was 12.3.  CT imaging of the abdomen and pelvis showed a 2.8 cm cystic lesion and an enlarged left ovary and free fluid in the pelvis suspected to be secondary to a ruptured hemorrhagic ovarian cyst.  Pelvic ultrasound showed complex free fluid in the posterior cul-de-sac consistent with hemoperitoneum as well as a 2.7 cm left ovarian cyst.  No sonographic evidence of left ovarian torsion.  The right ovary was not visualized sonographically.  She requested transfer to [facility].    She complains of ongoing abdominal pain.  No fevers.  No nausea or vomiting.      Past Medical History:   Diagnosis Date   • Breast cancer (CMS/HCC) 05/2019    LEFT BREAST   • Fibrocystic breast    • History of palpitations    • PONV (postoperative nausea and vomiting)    • Seasonal allergies        Past Surgical History:   Procedure Laterality Date   • BILATERAL BREAST REDUCTION Right 12/19/2019    Procedure: RIGHT REDUCTION FOR SYMMETRY;  Surgeon: PATRICIO Levine MD;  Location: [facility];  Service: Plastics   • BREAST BIOPSY     • BREAST RECONSTRUCTION Left 8/15/2019    Procedure: LEFT PLACEMENT TISSUE EXPANDER WITH ALLORDERM;  Surgeon: PATRICIO Levine MD;  Location: [facility];  Service: Plastics   • BREAST TISSUE EXPANDER REMOVAL INSERTION OF IMPLANT Bilateral 12/19/2019    Procedure: REMOVAL LEFT  TISSUE EXPANDER AND PLACEMENT OF IMPLANT;  Surgeon: PATRICIO Levine MD;  Location: [facility] OR;  Service: Plastics   • BUNIONECTOMY Bilateral     BUNIONECTOMY-2005 AND 2015   • COLONOSCOPY N/A 3/15/2021    Procedure: COLONOSCOPY TO CECUM AND TI;  Surgeon: Heather Montero MD;  Location: [facility] ENDOSCOPY;  Service: Gastroenterology;  Laterality: N/A;  SCREENING  POST- HEMORRHOIDS   • DILATATION AND CURETTAGE  1998   • DILATATION AND CURETTAGE  1991    Vaginal warts   • HYSTERECTOMY  08/2008   • MASTECTOMY W/ SENTINEL NODE BIOPSY Left 8/15/2019    Procedure: LEFt total mastectomy, LEFT axilla sentinel node biopsy,  immediate recosntruction;  Surgeon: Sarah Barker MD;  Location: [facility] OR;  Service: General   • WISDOM TOOTH EXTRACTION         SOCIAL HISTORY:   reports that she has never smoked. She has never used smokeless tobacco. She reports current alcohol use. She reports that she does not use drugs.    FAMILY HISTORY:  family history includes Brain cancer in her maternal grandmother; Breast cancer in her paternal aunt; Cancer in her maternal aunt; Diabetes in her maternal aunt and mother; Heart attack in her father; Heart disease in her mother; Hyperlipidemia in her maternal aunt and mother; Hypertension in her father and mother; Lung cancer in her maternal grandmother; Seizures in her brother.    ALLERGIES:  Allergies   Allergen Reactions   • Codeine Rash   • Percocet [Oxycodone-Acetaminophen] Itching       MEDICATIONS:  As listed in the electronic medical record.    Review of Systems   Gastrointestinal: Positive for abdominal pain.   Genitourinary: Positive for pelvic pain.   All other systems reviewed and are negative.      Vitals:    04/16/21 1232 04/16/21 1558   BP: 119/76 104/66   BP Location: Right arm Right arm   Patient Position: Lying Lying   Pulse: 86 90   Resp:  16   Temp: 98.4 °F (36.9 °C) 99.8 °F (37.7 °C)   TempSrc: Skin Skin   SpO2: 100% 100%       Physical Exam  Vitals reviewed.    Constitutional:       Appearance: She is well-developed.   HENT:      Head: Normocephalic and atraumatic.      Nose: Nose normal.   Eyes:      Conjunctiva/sclera: Conjunctivae normal.      Pupils: Pupils are equal, round, and reactive to light.   Cardiovascular:      Rate and Rhythm: Normal rate and regular rhythm.      Heart sounds: Normal heart sounds, S1 normal and S2 normal. No murmur heard.   No friction rub. No gallop.    Pulmonary:      Effort: Pulmonary effort is normal. No respiratory distress.      Breath sounds: Normal breath sounds. No stridor. No wheezing, rhonchi or rales.   Chest:      Chest wall: No tenderness.   Abdominal:      General: Bowel sounds are normal. There is no distension.      Palpations: Abdomen is soft. There is no mass.      Tenderness: There is abdominal tenderness in the right lower quadrant, suprapubic area and left lower quadrant. There is no guarding or rebound.   Musculoskeletal:         General: Normal range of motion.      Cervical back: Neck supple.   Lymphadenopathy:      Cervical: No cervical adenopathy.      Upper Body:      Right upper body: No supraclavicular adenopathy.      Left upper body: No supraclavicular adenopathy.   Skin:     General: Skin is warm and dry.      Findings: No erythema or rash.   Neurological:      Mental Status: She is alert and oriented to person, place, and time.      Cranial Nerves: No cranial nerve deficit.      Sensory: No sensory deficit.   Psychiatric:         Behavior: Behavior normal.         Thought Content: Thought content normal.         Judgment: Judgment normal.         DIAGNOSTIC DATA:    Results from last 7 days   Lab Units 04/16/21  1604   WBC 10*3/mm3 8.72   HEMOGLOBIN g/dL 11.8*   HEMATOCRIT % 36.7   PLATELETS 10*3/mm3 218      Results from last 7 days   Lab Units 04/16/21  1604   SODIUM mmol/L 140   POTASSIUM mmol/L 3.7   CHLORIDE mmol/L 106   CO2 mmol/L 24.6   BUN mg/dL 8   CREATININE mg/dL 0.67   CALCIUM mg/dL 8.8    GLUCOSE mg/dL 111*          IMAGING: None for review    Assessment/Plan   ASSESSMENT:  This is a 50 y.o. female with:    *History of T1CN0 low-grade ER SC positive HER-2 negative infiltrating ductal carcinoma of the left breast with DCIS.  Multifocal.  • Status post left mastectomy with sentinel lymph node biopsy  • Oncotype score 6  • Tamoxifen initiated September 2019 which she initially took at 10 mg daily  • 20 mg daily initiated December 2019  • Well-tolerated  • Hold at this time due to ovarian issues    *History of blood in the stool  • Colonoscopy 3/15/2021 with nonbleeding internal hemorrhoids, grade 1.  No other abnormality visualized.    *Acute abdominal pain  • She presented with acute abdominal pain to the [facility] on 4/15/2021  · Hemoglobin was 12.3.    · CT imaging of the abdomen and pelvis showed a 2.8 cm cystic lesion and an enlarged left ovary and free fluid in the pelvis suspected to be secondary to a ruptured hemorrhagic ovarian cyst.    · Pelvic ultrasound showed complex free fluid in the posterior cul-de-sac consistent with hemoperitoneum as well as a 2.7 cm left ovarian cyst.  No sonographic evidence of left ovarian torsion.  The right ovary was not visualized sonographically.    · She requested transfer to [facility].  • Gynecology consulted    RECOMMENDATIONS/PLAN:   1. Hold tamoxifen at this time  2. Await gynecology consultation    We will follow.  Discussed with NOAM Bonilla. Discussed with pharmacy staff.  Discussed with the patient and her mother at the bedside.    Armando Ervin MD

## 2021-04-16 NOTE — CONSULTS
[facility]  Екатерина Strauss  : 1970  MRN: [id number]  CSN: [id number]    Gynecology Consult    Subjective   Екатерина Strauss is a 50 y.o. year old  who was transferred to [facility] today from [facility] due to a left ovarian cyst.  She presented to the [facility] ER yesterday with acute onset of LLQ at 0800, at times with 10/10 pain rating.  Her pain was exacerbated with movement, ambulation or evacuation.  A CT scan showed a 2.8 cm left ovarian cyst with free fluid in the pelvis.  These findings were confirmed on u/s which also suggested some consolidation of the pelvic fluid consistent with organizing hemoperitoneum.  She was transferred to [facility] for gynecology services.  Today, she reports that her pain level has improved to a 6/10 without any pain medication although she is still having discomfort with ambulation.  She is tolerating po.  She underwent hysterectomy > 10 years ago due to bleeding issues.  Prior to starting tamoxifen in , she noted some night sweats, however has had some diurnal vasomotor symptoms since breast CA treatment.  She is otherwise without gyn complaint.  Hgb on admission to [facility] was 12.3.    Past Medical History:   Diagnosis Date   • Breast cancer (CMS/HCC) 2019    LEFT BREAST   • Fibrocystic breast    • History of palpitations    • PONV (postoperative nausea and vomiting)    • Seasonal allergies      Past Surgical History:   Procedure Laterality Date   • DILATATION AND CURETTAGE      Vaginal warts   • DILATATION AND CURETTAGE     • HYSTERECTOMY  2008   • MASTECTOMY W/ SENTINEL NODE BIOPSY Left 8/15/2019    Procedure: LEFt total mastectomy, LEFT axilla sentinel node biopsy,  immediate recosntruction;  Surgeon: Sarah Barker MD;  Location: [facility];  Service: General   • BREAST RECONSTRUCTION Left 8/15/2019    Procedure: LEFT PLACEMENT TISSUE EXPANDER WITH ALLORDERM;  Surgeon: PATRICIO Levine MD;  Location: [facility];   Service: Plastics   • BREAST TISSUE EXPANDER REMOVAL INSERTION OF IMPLANT Bilateral 2019    Procedure: REMOVAL LEFT TISSUE EXPANDER AND PLACEMENT OF IMPLANT;  Surgeon: PATRICIO Levine MD;  Location: [facility] MAIN OR;  Service: Plastics   • BILATERAL BREAST REDUCTION Right 2019    Procedure: RIGHT REDUCTION FOR SYMMETRY;  Surgeon: PATRICIO Levine MD;  Location: [facility] MAIN OR;  Service: Plastics   • COLONOSCOPY N/A 3/15/2021    Procedure: COLONOSCOPY TO CECUM AND TI;  Surgeon: Heather Montero MD;  Location: [facility] ENDOSCOPY;  Service: Gastroenterology;  Laterality: N/A;  SCREENING  POST- HEMORRHOIDS   • BREAST BIOPSY     • BUNIONECTOMY Bilateral     BUNIONECTOMY- AND    • VAGINAL HYSTERECTOMY     • WISDOM TOOTH EXTRACTION       OB History    Para Term  AB Living   2 2 0 0 0 0   SAB TAB Ectopic Molar Multiple Live Births   0 0 0 0 0 0      # Outcome Date GA Lbr Jatinder/2nd Weight Sex Delivery Anes PTL Lv   2 Para            1 Para              Social History    Tobacco Use      Smoking status: Never Smoker      Smokeless tobacco: Never Used      Current Facility-Administered Medications:   •  acetaminophen (TYLENOL) tablet 650 mg, 650 mg, Oral, Q4H PRN, Delon Valdez, APRN  •  naproxen (NAPROSYN) tablet 250 mg, 250 mg, Oral, BID With Meals, Laurie Elmore MD  •  ondansetron (ZOFRAN) tablet 4 mg, 4 mg, Oral, Q6H PRN **OR** ondansetron (ZOFRAN) injection 4 mg, 4 mg, Intravenous, Q6H PRN, Delon Valdez, APRN  •  sodium chloride 0.9 % flush 10 mL, 10 mL, Intravenous, Q12H, Delon Valdez, APRN  •  sodium chloride 0.9 % flush 10 mL, 10 mL, Intravenous, PRN, Delon Valdez, APRN    Allergies   Allergen Reactions   • Codeine Rash   • Percocet [Oxycodone-Acetaminophen] Itching       Review of Systems      Objective     Vital Signs: See nursing documentation   General: well developed; well nourished  no acute distress   Mental status: Alert and oriented   Heart: regular rate and rhythm    Lungs: breathing is unlabored   Abdomen: Soft, mildly distended.  1+ BLQ tenderness with mild rebound.  No guarding.   Pelvis: Clinical staff was present for exam  External genitalia:  normal appearance of the external genitalia including Bartholin's and DeRidder's glands.  Vaginal:  normal pink mucosa without prolapse or lesions. Tender cuff  Cervix:  absent.  Uterus:  absent.  Adnexa:  normal bimanual exam of the adnexa. tenderness of the bilateral adnexa to  deep palpation; 3 cm left adnexal fullness noted   Labs  Lab Results   Component Value Date     04/16/2021    HGB 11.8 (L) 04/16/2021    HCT 36.7 04/16/2021    WBC 8.72 04/16/2021     04/16/2021    K 3.7 04/16/2021     04/16/2021    CO2 24.6 04/16/2021    BUN 8 04/16/2021    CREATININE 0.67 04/16/2021    GLUCOSE 111 (H) 04/16/2021    ALBUMIN 4.00 01/28/2021    CALCIUM 8.8 04/16/2021    AST 18 01/28/2021    ALT 17 01/28/2021    BILITOT 0.5 01/28/2021        Assessment   1. Hemorrhagic left ovarian cyst- These are not uncommon in perimenopause, especially in women on anti-estrogenic compounds, due to aging oocyte partial unresponsiveness to FSH signals.  Given her improving pain and stable Hct, surgical intervention is not warranted at this time.  These cysts are usually self limited.     Plan   1. Begin naproxen BID to help with current discomfort.  Barring acute change, she will likely be ready for discharge tomorrow.  Please notify us at [phone] for any acute changes and prior to anticipated discharge so that we can help arrange outpatient follow up.          Laurie Elmore MD       (Pt's PCP is Provider, No Known)

## 2021-04-16 NOTE — H&P
"    Patient Name:  Екатерина Strauss  YOB: 1970  MRN:  [id number]  Admit Date:  4/16/2021  Patient Care Team:  Provider, No Known as PCP - General  Sarah Barker MD as Referring Physician (Breast Surgery)  Tanner Gooden MD as Consulting Physician (Hematology and Oncology)  PATRICIO Levine MD as Consulting Physician (Plastic Surgery)      Subjective   History Present Illness     No chief complaint on file.      Ms. Strauss is a 50 y.o. never smoker with a history of fibrocystic breast, malignant neoplasm left status post mastectomy who transferred from [facility] for nontraumatic hemoperitoneum secondary to presumed left ovarian cyst rupture.      Patient reliable historian answering all questions appropriately.  Patient's mother at bedside.  Patient reportedly developed acute left lower abdominal quadrant pain approximately 0800 on 4/15/2021 and use stool softeners /laxative to facilitate bowel movement given presumed constipation and source of lower abdominal tenderness; however, noted no change in pain sensation despite bowel movement.  Reported pain as \"sharp\"; therefore, went to [facility] ER for further evaluation.    CT abdomen pelvis with contrast and transvaginal ultrasound confirmed findings of left ovarian cyst rupture with hemoperitoneum present.  Patient under the care of Dr. Gooden with CBC request transfer to [facility] for continued work-up and treatment.    Further recommendation per hospital course.  See additional details below in assessment/plan.    History of Present Illness  Review of Systems   Constitutional: Negative for chills and fever.   HENT: Negative for congestion and rhinorrhea.    Eyes: Negative for visual disturbance.   Respiratory: Negative for cough and shortness of breath.    Cardiovascular: Negative for chest pain and leg swelling.   Gastrointestinal: Positive for abdominal pain. Negative for abdominal " distention, anal bleeding, blood in stool, constipation, diarrhea, nausea and vomiting.   Genitourinary: Negative for difficulty urinating, dysuria, vaginal bleeding, vaginal discharge and vaginal pain.   Musculoskeletal: Negative for gait problem and neck pain.   Skin: Negative for rash and wound.   Neurological: Negative for dizziness and weakness.   Psychiatric/Behavioral: Negative for confusion and hallucinations.   All other systems reviewed and are negative.       Personal History     Past Medical History:   Diagnosis Date   • Breast cancer (CMS/HCC) 05/2019    LEFT BREAST   • Fibrocystic breast    • History of palpitations    • PONV (postoperative nausea and vomiting)    • Seasonal allergies      Past Surgical History:   Procedure Laterality Date   • BILATERAL BREAST REDUCTION Right 12/19/2019    Procedure: RIGHT REDUCTION FOR SYMMETRY;  Surgeon: PATRICIO Levine MD;  Location: [facility] OR;  Service: Plastics   • BREAST BIOPSY     • BREAST RECONSTRUCTION Left 8/15/2019    Procedure: LEFT PLACEMENT TISSUE EXPANDER WITH ALLORDERM;  Surgeon: PATRICIO Levine MD;  Location: [facility] OR;  Service: Plastics   • BREAST TISSUE EXPANDER REMOVAL INSERTION OF IMPLANT Bilateral 12/19/2019    Procedure: REMOVAL LEFT TISSUE EXPANDER AND PLACEMENT OF IMPLANT;  Surgeon: PATRICIO Levine MD;  Location: [facility] OR;  Service: Plastics   • BUNIONECTOMY Bilateral     BUNIONECTOMY-2005 AND 2015   • COLONOSCOPY N/A 3/15/2021    Procedure: COLONOSCOPY TO CECUM AND TI;  Surgeon: Heather Montero MD;  Location: [facility] ENDOSCOPY;  Service: Gastroenterology;  Laterality: N/A;  SCREENING  POST- HEMORRHOIDS   • DILATATION AND CURETTAGE  1998   • DILATATION AND CURETTAGE  1991    Vaginal warts   • HYSTERECTOMY  08/2008   • MASTECTOMY W/ SENTINEL NODE BIOPSY Left 8/15/2019    Procedure: LEFt total mastectomy, LEFT axilla sentinel node biopsy,  immediate recosntruction;  Surgeon: Sarah Barker MD;  Location: [facility] OR;   Service: General   • WISDOM TOOTH EXTRACTION       Family History   Problem Relation Age of Onset   • Diabetes Mother    • Heart disease Mother    • Hypertension Mother    • Hyperlipidemia Mother    • Cancer Maternal Aunt         OF UNKNOWN ORGIN   • Diabetes Maternal Aunt    • Hyperlipidemia Maternal Aunt    • Breast cancer Paternal Aunt         PATERNAL GREAT AUNT-UNKNOLWN AGE    • Lung cancer Maternal Grandmother    • Brain cancer Maternal Grandmother    • Hypertension Father    • Heart attack Father    • Seizures Brother    • Malig Hyperthermia Neg Hx      Social History     Tobacco Use   • Smoking status: Never Smoker   • Smokeless tobacco: Never Used   Substance Use Topics   • Alcohol use: Yes     Comment: 3 PER MONTH-SOCIALLY   • Drug use: No     No current facility-administered medications on file prior to encounter.     Current Outpatient Medications on File Prior to Encounter   Medication Sig Dispense Refill   • loratadine-pseudoephedrine ([employer] Allergy Relief-D)  MG per 24 hr tablet Take 1 tablet by mouth Daily. 30 tablet 2   • Multiple Vitamin (MULTIVITAMIN) tablet Take 1 tablet by mouth Daily.     • tamoxifen (NOLVADEX) 20 MG chemo tablet TAKE 1 TABLET BY MOUTH EVERY DAY 90 tablet 1     Allergies   Allergen Reactions   • Codeine Rash   • Percocet [Oxycodone-Acetaminophen] Itching       Objective    Objective     Vital Signs  Temp:  [98.4 °F (36.9 °C)-99.8 °F (37.7 °C)] 99.8 °F (37.7 °C)  Heart Rate:  [86-90] 90  Resp:  [16] 16  BP: (104-119)/(66-76) 104/66  SpO2:  [100 %] 100 %  on   ;   Device (Oxygen Therapy): room air  There is no height or weight on file to calculate BMI.    Physical Exam  Constitutional:       General: She is not in acute distress.     Appearance: She is not ill-appearing.   HENT:      Head: Normocephalic and atraumatic.   Eyes:      Extraocular Movements: Extraocular movements intact.      Conjunctiva/sclera: Conjunctivae normal.   Cardiovascular:      Rate and Rhythm: Normal  rate.      Heart sounds: Normal heart sounds.   Pulmonary:      Effort: Pulmonary effort is normal.      Breath sounds: Normal breath sounds.   Abdominal:      General: Bowel sounds are normal. There is no distension.      Palpations: Abdomen is soft.      Tenderness: There is abdominal tenderness (LLQ).   Musculoskeletal:         General: No tenderness.      Cervical back: Normal range of motion and neck supple.      Right lower leg: No edema.      Left lower leg: No edema.   Skin:     General: Skin is warm and dry.   Neurological:      Mental Status: She is alert and oriented to person, place, and time.      Cranial Nerves: No cranial nerve deficit.   Psychiatric:         Behavior: Behavior normal.         Thought Content: Thought content normal.         Results Review:  I reviewed the patient's new clinical results.  I reviewed the patient's new imaging results and agree with the interpretation.  I reviewed the patient's other test results and agree with the interpretation  I personally viewed and interpreted the patient's EKG/Telemetry data  Discussed with ED provider.    Lab Results (last 24 hours)     Procedure Component Value Units Date/Time    CBC (No Diff) [[id number]]  (Abnormal) Collected: 04/16/21 1604    Specimen: Blood Updated: 04/16/21 1614     WBC 8.72 10*3/mm3      RBC 3.98 10*6/mm3      Hemoglobin 11.8 g/dL      Hematocrit 36.7 %      MCV 92.2 fL      MCH 29.6 pg      MCHC 32.2 g/dL      RDW 13.0 %      RDW-SD 43.8 fl      MPV 10.4 fL      Platelets 218 10*3/mm3     Basic Metabolic Panel [[id number]] Collected: 04/16/21 1604    Specimen: Blood Updated: 04/16/21 1609    Protime-INR [[id number]]  (Abnormal) Collected: 04/16/21 1604    Specimen: Blood Updated: 04/16/21 1623     Protime 14.7 Seconds      INR 1.17          Imaging Results (Last 24 Hours)     Procedure Component Value Units Date/Time    CT Outside Films [[id number]] Resulted: 04/16/21 1537     Updated: 04/16/21 1537    Narrative:      This  procedure was auto-finalized with no dictation required.    US Outside Films [[id number]] Resulted: 04/16/21 1536     Updated: 04/16/21 1536    Narrative:      This procedure was auto-finalized with no dictation required.              No orders to display        Assessment/Plan     Active Hospital Problems    Diagnosis  POA   • **Left lower quadrant abdominal pain [R10.32]  Yes   • Hemoperitoneum, nontraumatic [K66.1]  Yes   • Left ovarian cyst [N83.202]  Yes   • Anemia [D64.9]  Yes   • Malignant neoplasm of upper-outer quadrant of left breast in female, estrogen receptor positive (CMS/HCC) [C50.412, Z17.0]  Not Applicable      Resolved Hospital Problems   No resolved problems to display.       Ms. Strauss is a 50 y.o. never smoker with a history of fibrocystic breast, malignant neoplasm left status post mastectomy who transferred from [facility] for nontraumatic hemoperitoneum secondary to presumed left ovarian cyst rupture.        Left lower quadrant abdominal pain /hemoperitoneum nontraumatic /left ovarian cyst.  Noted CT abdomen pelvis reported findings at [facility].  Consult obstetrician gynecology to advise further.  [facility] transvaginal ultrasound exam uploaded.  Acetaminophen as needed pain for now.    Malignant neoplasm of upper-outer quadrant of left breast in female, estrogen receptor positive (CMS/HCC).  Chronic tamoxifen as possible causative agent regarding ovarian cyst development.  Oncology consulted to advise further.    Anemia.  Serum hemoglobin stable with trend.  No overt signs of active bleeding.  BP acceptable, hemodynamically stable.    · I discussed the patient's findings and my recommendations with Dr. Haas.    VTE Prophylaxis - SCD  Code Status - CPR       NOAM Grullon  [facility]  04/16/21  16:28 EDT

## 2021-04-17 VITALS
OXYGEN SATURATION: 98 % | TEMPERATURE: 98.2 F | HEART RATE: 92 BPM | BODY MASS INDEX: 25.18 KG/M2 | WEIGHT: 170 LBS | SYSTOLIC BLOOD PRESSURE: 95 MMHG | RESPIRATION RATE: 16 BRPM | DIASTOLIC BLOOD PRESSURE: 53 MMHG | HEIGHT: 69 IN

## 2021-04-17 LAB
ANION GAP SERPL CALCULATED.3IONS-SCNC: 7.3 MMOL/L (ref 5–15)
BUN SERPL-MCNC: 8 MG/DL (ref 6–20)
BUN/CREAT SERPL: 11.8 (ref 7–25)
CALCIUM SPEC-SCNC: 8.7 MG/DL (ref 8.6–10.5)
CHLORIDE SERPL-SCNC: 107 MMOL/L (ref 98–107)
CO2 SERPL-SCNC: 25.7 MMOL/L (ref 22–29)
CREAT SERPL-MCNC: 0.68 MG/DL (ref 0.57–1)
DEPRECATED RDW RBC AUTO: 42.3 FL (ref 37–54)
ERYTHROCYTE [DISTWIDTH] IN BLOOD BY AUTOMATED COUNT: 12.8 % (ref 12.3–15.4)
GFR SERPL CREATININE-BSD FRML MDRD: 111 ML/MIN/1.73
GLUCOSE SERPL-MCNC: 98 MG/DL (ref 65–99)
HCT VFR BLD AUTO: 31.1 % (ref 34–46.6)
HGB BLD-MCNC: 10.2 G/DL (ref 12–15.9)
MCH RBC QN AUTO: 29.5 PG (ref 26.6–33)
MCHC RBC AUTO-ENTMCNC: 32.8 G/DL (ref 31.5–35.7)
MCV RBC AUTO: 89.9 FL (ref 79–97)
PLATELET # BLD AUTO: 204 10*3/MM3 (ref 140–450)
PMV BLD AUTO: 11 FL (ref 6–12)
POTASSIUM SERPL-SCNC: 4 MMOL/L (ref 3.5–5.2)
RBC # BLD AUTO: 3.46 10*6/MM3 (ref 3.77–5.28)
SODIUM SERPL-SCNC: 140 MMOL/L (ref 136–145)
WBC # BLD AUTO: 6.94 10*3/MM3 (ref 3.4–10.8)

## 2021-04-17 PROCEDURE — 80048 BASIC METABOLIC PNL TOTAL CA: CPT | Performed by: NURSE PRACTITIONER

## 2021-04-17 PROCEDURE — 99214 OFFICE O/P EST MOD 30 MIN: CPT | Performed by: INTERNAL MEDICINE

## 2021-04-17 PROCEDURE — G0378 HOSPITAL OBSERVATION PER HR: HCPCS

## 2021-04-17 PROCEDURE — 85027 COMPLETE CBC AUTOMATED: CPT | Performed by: NURSE PRACTITIONER

## 2021-04-17 RX ORDER — NAPROXEN 250 MG/1
250 TABLET ORAL 2 TIMES DAILY WITH MEALS
Qty: 20 TABLET | Refills: 0 | Status: SHIPPED | OUTPATIENT
Start: 2021-04-17 | End: 2021-04-27

## 2021-04-17 RX ADMIN — NAPROXEN 250 MG: 250 TABLET ORAL at 10:05

## 2021-04-17 RX ADMIN — SODIUM CHLORIDE, PRESERVATIVE FREE 10 ML: 5 INJECTION INTRAVENOUS at 10:05

## 2021-04-17 NOTE — DISCHARGE SUMMARY
Patient Name: Екатерина Strauss  : 1970  MRN: [id number]    Date of Admission: 2021  Date of Discharge:  2021  Primary Care Physician: Provider, No Known      Chief Complaint:   No chief complaint on file.      Discharge Diagnoses     Active Hospital Problems    Diagnosis  POA   • **Left lower quadrant abdominal pain [R10.32]  Yes   • Hemoperitoneum, nontraumatic [K66.1]  Yes   • Left ovarian cyst [N83.202]  Yes   • Anemia [D64.9]  Yes   • Follow up [Z09]  Not Applicable   • Malignant neoplasm of upper-outer quadrant of left breast in female, estrogen receptor positive (CMS/HCC) [C50.412, Z17.0]  Not Applicable      Resolved Hospital Problems   No resolved problems to display.        Hospital Course     Ms. Strauss is a 50 y.o. female with a history of likely neoplasm of left breast status post left mastectomy who transferred from [facility] to [facility] initially complaining of left lower quadrant abdominal pain.  Please see the admitting history and physical for further details.  She was found to have nontraumatic hemoperitoneum secondary to presumed rupture of left ovarian cyst and was admitted to the hospital for further evaluation and treatment.      Patient evaluated by oncologist who manages tamoxifen for history of malignant neoplasm of left breast noted side effects of tamoxifen include ovarian cyst formation and recommend hold tamoxifen until follow-up in outpatient setting at [facility] office.    OB/GYN evaluated patient for hemoperitoneum secondary to presumed ruptured of left ovarian cyst without torsion confirmed by CT abdomen pelvis with contrast & transvaginal ultrasound reported findings.  Initial decrease serum hemoglobin 12.0 down to 10.2; however, abdomen does not appear acute and suspect tamponade hemostasis in localized region.  Clinically patient appears improved without discomfort or evidence of tachycardia.  Hemodynamically stability  noted.    Discussed continue follow-up with primary care provider for complete blood count monitoring.    Day of Discharge     Subjective:  Patient appears relatively comfortable and in no apparent distress.  Tolerating p.o.  Voices no new concerns.  Eager to discharge home today on 4/17/2021 agrees with plan for follow-up oncology primary care provider as previously discussed.    Physical Exam:  Temp:  [98 °F (36.7 °C)-99.8 °F (37.7 °C)] 98.2 °F (36.8 °C)  Heart Rate:  [90-97] 92  Resp:  [16] 16  BP: ()/(53-74) 95/53  Body mass index is 25.47 kg/m².     Physical Exam  Constitutional:       Appearance: She is not ill-appearing.   HENT:      Head: Normocephalic and atraumatic.   Cardiovascular:      Rate and Rhythm: Normal rate.      Heart sounds: Normal heart sounds.   Pulmonary:      Effort: Pulmonary effort is normal.      Breath sounds: Normal breath sounds.   Abdominal:      General: Bowel sounds are normal. There is no distension.      Palpations: Abdomen is soft.      Tenderness: There is abdominal tenderness (minimal LLQ tenderness). There is no guarding.   Musculoskeletal:         General: No tenderness.      Cervical back: Normal range of motion and neck supple.      Right lower leg: No edema.      Left lower leg: No edema.   Skin:     General: Skin is warm and dry.   Neurological:      Mental Status: She is alert and oriented to person, place, and time.      Cranial Nerves: No cranial nerve deficit.   Psychiatric:         Behavior: Behavior normal.         Thought Content: Thought content normal.         Consultants     Consult Orders (all) (From admission, onward)     Start     Ordered    04/16/21 1439  Hematology & Oncology Inpatient Consult  Once     Specialty:  Hematology and Oncology  Provider:  Tanner Gooden MD    04/16/21 1439    04/16/21 1436  Inpatient Obstetrics / Gynecology Consult  Once     Specialty:  Obstetrics and Gynecology  Provider:  Yang Menendez MD    04/16/21 1436               Procedures     * Surgery not found *      Imaging Results (All)     Procedure Component Value Units Date/Time    CT Outside Films [[id number]] Resulted: 04/16/21 1537     Updated: 04/16/21 1537    Narrative:      This procedure was auto-finalized with no dictation required.    US Outside Films [[id number]] Resulted: 04/16/21 1536     Updated: 04/16/21 1536    Narrative:      This procedure was auto-finalized with no dictation required.            Pertinent Labs     Results from last 7 days   Lab Units 04/17/21  0442 04/16/21  1604   WBC 10*3/mm3 6.94 8.72   HEMOGLOBIN g/dL 10.2* 11.8*   PLATELETS 10*3/mm3 204 218     Results from last 7 days   Lab Units 04/17/21  0442 04/16/21  1604   SODIUM mmol/L 140 140   POTASSIUM mmol/L 4.0 3.7   CHLORIDE mmol/L 107 106   CO2 mmol/L 25.7 24.6   BUN mg/dL 8 8   CREATININE mg/dL 0.68 0.67   GLUCOSE mg/dL 98 111*   Estimated Creatinine Clearance: 120.5 mL/min (by C-G formula based on SCr of 0.68 mg/dL).    Results from last 7 days   Lab Units 04/17/21  0442 04/16/21  1604   CALCIUM mg/dL 8.7 8.8               Invalid input(s): LDLCALC          Test Results Pending at Discharge       Discharge Details        Discharge Medications      New Medications      Instructions Start Date   naproxen 250 MG tablet  Commonly known as: NAPROSYN   250 mg, Oral, 2 Times Daily With Meals         Continue These Medications      Instructions Start Date   [employer] Allergy Relief-D  MG per 24 hr tablet  Generic drug: loratadine-pseudoephedrine   1 tablet, Oral, Daily      multivitamin tablet tablet  Generic drug: multivitamin   1 tablet, Oral, Daily         Stop These Medications    tamoxifen 20 MG chemo tablet  Commonly known as: NOLVADEX            Allergies   Allergen Reactions   • Codeine Rash   • Percocet [Oxycodone-Acetaminophen] Itching         Discharge Disposition:  Home or Self Care    Discharge Diet:  Diet Order   Procedures   • Diet Regular       Discharge Activity:       CODE  STATUS:    Code Status and Medical Interventions:   Ordered at: 04/16/21 1355     Code Status:    CPR     Medical Interventions (Level of Support Prior to Arrest):    Full       Future Appointments   Date Time Provider Department Center   7/15/2021  4:00 PM LAB CHAIR 5 [facility]  [facility]   7/15/2021  4:40 PM Tanner Godoen MD [facility]     Additional Instructions for the Follow-ups that You Need to Schedule     Discharge Follow-up with PCP   As directed       Currently Documented PCP:    Provider, No Known    PCP Phone Number:    [phone]     Follow Up Details: Please call to schedule a one week or earliest available follow-up appointment PCP as previously discussed; CBC monitoring, pelvic exam         Discharge Follow-up with Specialty: oncology--CBC   As directed      Specialty: oncology--CBC    Follow Up Details: Please call to schedule 1-2 weeks follow-up appointment with oncology as previously discussed         CBC & Differential   Apr 20, 2021      Manual Differential: No           Follow-up Information     Provider, No Known .    Why: Please call to schedule a one week or earliest available follow-up appointment PCP as previously discussed; CBC monitoring, pelvic exam  Contact information:  [place] [id number] [phone]             Izabella Dwyer MD Follow up.    Specialty: Obstetrics and Gynecology  Why: Follow up in 2-4 weeks. May see GYN of choice.  Contact information:  [address]  [address] [id number] [phone]                   Additional Instructions for the Follow-ups that You Need to Schedule     Discharge Follow-up with PCP   As directed       Currently Documented PCP:    Provider, No Known    PCP Phone Number:    [phone]     Follow Up Details: Please call to schedule a one week or earliest available follow-up appointment PCP as previously discussed; CBC monitoring, pelvic exam         Discharge Follow-up with  Specialty: oncology--CBC   As directed      Specialty: oncology--CBC    Follow Up Details: Please call to schedule 1-2 weeks follow-up appointment with oncology as previously discussed         CBC & Differential   Apr 20, 2021      Manual Differential: No           Time Spent on Discharge:  Greater than 30 minutes      NOAM Grullon  [facility]  04/17/21  09:55 EDT

## 2021-04-17 NOTE — PLAN OF CARE
Goal Outcome Evaluation:  Plan of Care Reviewed With: patient  Progress: improving  Outcome Summary: Pt had a good night. Flank pain and dysuria subsided. Pt had a mild headacke which resolved with naproxen. Up ad nick. Anticipating discharge today.

## 2021-04-17 NOTE — PROGRESS NOTES
"[facility] INPATIENT PROGRESS NOTE    Length of Stay:  0 days    CHIEF COMPLAINT/REASON FOR VISIT:  History of breast cancer, on tamoxifen  Ruptured ovarian cyst    SUBJECTIVE: She has been afebrile.  On room air. Abdominal pain much less severe. She is eating.     ROS:  14 systems reviewed with pertinent positives and negatives in the HPI. Reviewed today.    OBJECTIVE:  Vitals:    04/16/21 1232 04/16/21 1558 04/16/21 2115 04/17/21 0325   BP: 119/76 104/66 122/74 111/67   BP Location: Right arm Right arm Right arm Right arm   Patient Position: Lying Lying Lying Lying   Pulse: 86 90 97 90   Resp:  16 16 16   Temp: 98.4 °F (36.9 °C) 99.8 °F (37.7 °C) 98.4 °F (36.9 °C) 98 °F (36.7 °C)   TempSrc: Skin Skin Oral Oral   SpO2: 100% 100% 100% 98%   Weight:   77.1 kg (170 lb)    Height:   174 cm (68.5\")          PHYSICAL EXAMINATION:  General: NAD, alert/oriented  Chest/Lungs: CTAB  Heart: RRR  Abdomen/GI: soft, minimal diffuse tenderness, NABS, no rebound/guarding  Extremities: WWP, no edema    DIAGNOSTIC DATA:  Results Review:     I reviewed the patient's new clinical results.    Results from last 7 days   Lab Units 04/17/21  0442   WBC 10*3/mm3 6.94   HEMOGLOBIN g/dL 10.2*   HEMATOCRIT % 31.1*   PLATELETS 10*3/mm3 204     Lab Results   Component Value Date    NEUTROABS 3.34 01/28/2021     Results from last 7 days   Lab Units 04/17/21  0442   SODIUM mmol/L 140   POTASSIUM mmol/L 4.0   CHLORIDE mmol/L 107   CO2 mmol/L 25.7   BUN mg/dL 8   CREATININE mg/dL 0.68   GLUCOSE mg/dL 98   CALCIUM mg/dL 8.7     Results from last 7 days   Lab Units 04/16/21  1604   INR  1.17*             IMAGING:    None reviewed    Assessment/Plan   ASSESSMENT/PLAN:  This is a 50 y.o. female with:     *History of T1CN0 low-grade ER MN positive HER-2 negative infiltrating ductal carcinoma of the left breast with DCIS.  Multifocal.  · Status post left mastectomy with sentinel lymph node biopsy  · Oncotype score 6  · Tamoxifen initiated " September 2019 which she initially took at 10 mg daily  · 20 mg daily initiated December 2019  · Well-tolerated  · Hold at this time due to ovarian issues     *History of blood in the stool  · Colonoscopy 3/15/2021 with nonbleeding internal hemorrhoids, grade 1.  No other abnormality visualized.     *Acute abdominal pain  · She [facility] with [facility]  · Hemoglobin was 12.3.    · CT imaging of the abdomen and pelvis showed a 2.8 cm cystic lesion and an enlarged left ovary and free fluid in the pelvis suspected to be secondary to a ruptured hemorrhagic ovarian cyst.    · Pelvic ultrasound showed complex free fluid in the posterior cul-de-sac consistent with hemoperitoneum as well as a 2.7 cm left ovarian cyst.  No sonographic evidence of left ovarian torsion.  The right ovary was not visualized sonographically.    · She requested transfer to [facility].  · Gynecology consulted with plans for an NSAID and hopefully quick discharge  · Hemoglobin is trending down at 10.2, down from 11.8.  Bleeding from ruptured ovarian cyst usually does stop quickly without intervention. If pain worsens would need to consider further evaluation.  · Will check a CBC in the office on Tuesday next week     RECOMMENDATIONS/PLAN:   1. Hold tamoxifen at this time as there is some association in the literature regarding tamoxifen and the development of ovarian cysts  2. Daily CBC while admitted  3. OK for d/c from my standpoint  4. I'll arrange a CBC with RN review next week on Tuesday. Notes state that her insurance does not pay for NP visits. At that time if her hgb is stable we will have her resume tamoxifen.  5. F/u with Dr. Gooden on 7/15 as previously scheduled.  6. Discussed with NOAM Bonilla MD

## 2021-04-17 NOTE — PROGRESS NOTES
[facility]  Екатерина Strauss  : 1970  MRN: [id number]  CSN: [id number]    Hospital Day: 2     CC: hospital follow-up for probable hemorrhagic left ovarian cyst    Subjective   Feeling much better, wants to go home. Had a gyn in  that did her hysterectomy but cant remember her name. Has hx of HPV warts and abnormal PAP smears     Objective     Min/max vitals past 24 hours:   Temp  Min: 98 °F (36.7 °C)  Max: 99.8 °F (37.7 °C)  BP  Min: 95/53  Max: 122/74  Pulse  Min: 86  Max: 97  Resp  Min: 16  Max: 16        I/O last 3 completed shifts:  In: 330 [P.O.:330]  Out: -     General: well developed; well nourished  no acute distress   Abdomen: Not performed.   Pelvic: Not performed   Ext: Calves NT     CBC w/ diff:   Lab Results   Component Value Date     2021    HGB 10.2 (L) 2021    HCT 31.1 (L) 2021    MCV 89.9 2021    RDW 12.8 2021    WBC 6.94 2021    NEUTRORELPCT 53.9 2021    AUTOIGPER 0.3 2021    LYMPHORELPCT 35.9 2021    MONORELPCT 8.6 2021    EOSRELPCT 0.5 2021    BASORELPCT 0.8 2021     CMP:   Lab Results   Component Value Date     2021    K 4.0 2021     2021    CO2 25.7 2021    BUN 8 2021    CREATININE 0.68 2021    GLUCOSE 98 2021    ALBUMIN 4.00 2021    CALCIUM 8.7 2021    AST 18 2021    ALT 17 2021    BILITOT 0.5 2021          Assessment   1. Subjectively improved from abdominal pain, clinically stable with no signs of shock     Plan   1. Can discharge with gyn of pts choice. She can see [facility] if she desires in 2-4 weeks for follow up. Keep hydrated and eat iron rich food    Callie Sunshine MD  2021  11:11 EDT

## 2021-04-19 ENCOUNTER — TELEPHONE (OUTPATIENT)
Dept: ONCOLOGY | Facility: CLINIC | Age: 51
End: 2021-04-19

## 2021-04-19 NOTE — CASE MANAGEMENT/SOCIAL WORK
Case Management Discharge Note      Final Note: Home---no needs         Selected Continued Care - Discharged on 4/17/2021 Admission date: 4/16/2021 - Discharge disposition: Home or Self Care    Destination    No services have been selected for the patient.              Durable Medical Equipment    No services have been selected for the patient.              Dialysis/Infusion    No services have been selected for the patient.              Home Medical Care    No services have been selected for the patient.              Therapy    No services have been selected for the patient.              Community Resources    No services have been selected for the patient.                       Final Discharge Disposition Code: 01 - home or self-care

## 2021-04-19 NOTE — TELEPHONE ENCOUNTER
----- Message from Armando Ervin MD sent at 4/17/2021 10:06 AM EDT -----  Regarding: CBC with RN review on Tuesday  She needs a CBC with RN review on Tuesday. I can review the result with the nurse. Notes state that her insurance does not pay for NP visits. Dr. Gooden is out of town.  CBC ordered. Thanks, JAYSON

## 2021-04-19 NOTE — TELEPHONE ENCOUNTER
CALLED PT WITH HER APPT DATE AND TIME - FOR TOMORROW - LVM IF THIS IS NOT A GOOD DATE AND TIME ASKED PT TO CALL US BACK.

## 2021-04-20 ENCOUNTER — LAB (OUTPATIENT)
Dept: LAB | Facility: HOSPITAL | Age: 51
End: 2021-04-20

## 2021-04-20 ENCOUNTER — INFUSION (OUTPATIENT)
Dept: ONCOLOGY | Facility: HOSPITAL | Age: 51
End: 2021-04-20

## 2021-04-20 DIAGNOSIS — D64.9 ANEMIA, UNSPECIFIED TYPE: ICD-10-CM

## 2021-04-20 LAB
BASOPHILS # BLD AUTO: 0.03 10*3/MM3 (ref 0–0.2)
BASOPHILS NFR BLD AUTO: 0.4 % (ref 0–1.5)
DEPRECATED RDW RBC AUTO: 41.9 FL (ref 37–54)
EOSINOPHIL # BLD AUTO: 0.05 10*3/MM3 (ref 0–0.4)
EOSINOPHIL NFR BLD AUTO: 0.6 % (ref 0.3–6.2)
ERYTHROCYTE [DISTWIDTH] IN BLOOD BY AUTOMATED COUNT: 13 % (ref 12.3–15.4)
HCT VFR BLD AUTO: 36 % (ref 34–46.6)
HGB BLD-MCNC: 12.4 G/DL (ref 12–15.9)
IMM GRANULOCYTES # BLD AUTO: 0.04 10*3/MM3 (ref 0–0.05)
IMM GRANULOCYTES NFR BLD AUTO: 0.5 % (ref 0–0.5)
LYMPHOCYTES # BLD AUTO: 2.29 10*3/MM3 (ref 0.7–3.1)
LYMPHOCYTES NFR BLD AUTO: 28.2 % (ref 19.6–45.3)
MCH RBC QN AUTO: 30.4 PG (ref 26.6–33)
MCHC RBC AUTO-ENTMCNC: 34.4 G/DL (ref 31.5–35.7)
MCV RBC AUTO: 88.2 FL (ref 79–97)
MONOCYTES # BLD AUTO: 0.69 10*3/MM3 (ref 0.1–0.9)
MONOCYTES NFR BLD AUTO: 8.5 % (ref 5–12)
NEUTROPHILS NFR BLD AUTO: 5.03 10*3/MM3 (ref 1.7–7)
NEUTROPHILS NFR BLD AUTO: 61.8 % (ref 42.7–76)
NRBC BLD AUTO-RTO: 0 /100 WBC (ref 0–0.2)
PLATELET # BLD AUTO: 215 10*3/MM3 (ref 140–450)
PMV BLD AUTO: 11.1 FL (ref 6–12)
RBC # BLD AUTO: 4.08 10*6/MM3 (ref 3.77–5.28)
WBC # BLD AUTO: 8.13 10*3/MM3 (ref 3.4–10.8)

## 2021-04-20 PROCEDURE — 36415 COLL VENOUS BLD VENIPUNCTURE: CPT

## 2021-04-20 PROCEDURE — 85025 COMPLETE CBC W/AUTO DIFF WBC: CPT

## 2021-04-20 PROCEDURE — G0463 HOSPITAL OUTPT CLINIC VISIT: HCPCS

## 2021-04-20 NOTE — NURSING NOTE
Pt presents for CBC review. Pt was in the hospital over the weekend w/ a ruptured ovarian cyst and was instructed to hold Tamoxifen until coming in today. CBC WNL at this time and pt states she feels much better than she had. Pt also reports 2 episodes of diarrhea daily since Sunday. She states she had been taking stool softeners to counter the constipation caused by the Tamoxifen. She also reports she is still somewhat sore but hasn't required much pain medication. R/w Dr. Ervin per appt note, okay for pt to resume Tamoxifen. It is possible the diarrhea is related to the stool softeners since pt has been holding Tamoxifen. Pt v/u and will resume Tamoxifen. Pt also aware that if diarrhea persists after restarting Tamoxifen that she should call to let the office know. Lelia VILLEGAS messaged to make her aware at Dr. Ervin's request. Pt v/u of all and copy of labs given. She will call with any other problems or concerns.    Lab Results   Component Value Date    WBC 8.13 04/20/2021    HGB 12.4 04/20/2021    HCT 36.0 04/20/2021    MCV 88.2 04/20/2021     04/20/2021

## 2021-06-03 ENCOUNTER — OFFICE VISIT (OUTPATIENT)
Dept: OBSTETRICS AND GYNECOLOGY | Facility: CLINIC | Age: 51
End: 2021-06-03

## 2021-06-03 ENCOUNTER — TELEPHONE (OUTPATIENT)
Dept: ONCOLOGY | Facility: CLINIC | Age: 51
End: 2021-06-03

## 2021-06-03 VITALS
WEIGHT: 179 LBS | SYSTOLIC BLOOD PRESSURE: 113 MMHG | HEIGHT: 69 IN | DIASTOLIC BLOOD PRESSURE: 67 MMHG | BODY MASS INDEX: 26.51 KG/M2

## 2021-06-03 DIAGNOSIS — N95.1 PERIMENOPAUSAL SYMPTOMS: ICD-10-CM

## 2021-06-03 DIAGNOSIS — Z79.810 CARE RELATED TO CURRENT TAMOXIFEN USE: ICD-10-CM

## 2021-06-03 DIAGNOSIS — N83.202 LEFT OVARIAN CYST: Primary | ICD-10-CM

## 2021-06-03 DIAGNOSIS — N83.201 RIGHT OVARIAN CYST: ICD-10-CM

## 2021-06-03 PROCEDURE — 99203 OFFICE O/P NEW LOW 30 MIN: CPT | Performed by: STUDENT IN AN ORGANIZED HEALTH CARE EDUCATION/TRAINING PROGRAM

## 2021-06-03 RX ORDER — TAMOXIFEN CITRATE 20 MG/1
20 TABLET ORAL DAILY
COMMUNITY
Start: 2021-04-23 | End: 2021-08-11 | Stop reason: HOSPADM

## 2021-06-03 NOTE — TELEPHONE ENCOUNTER
Patient called in with concerns re: being on tamoxifen and developing ovarian cysts. Was hospitalized in April with this and in follow up with her OB/GYN today she had a transvaginal u/s which showed another cyst. Since it is small, they will give it time to hopefully resolve on its own. But pt concerned re: recurrence of these cysts. Would like sooner f/u appointment with Dr. Gooden. Reviewed schedule and message routed to arrange for sooner appointment. Pt v/u

## 2021-06-03 NOTE — PROGRESS NOTES
Chief Complaint   Patient presents with   • Gynecologic Exam     after hospital visit in 2021 with a left ovarian cyst        SUBJECTIVE:     Екатерина Strauss is a 51 y.o.  who presents for hospital follow up of left ovarian cyst. The patient was admitted April for left lower quadrant abdominal pain that was presumed to be secondary to a ruptured left ovarian cyst and hemoperitoneum. She has history of breast cancer s/p left mastectomy, current taking tamoxifen. She reports that the pain gradually improved 2 weeks after discharge and now she feels twinge of pain intermittent in her left lower quadrant. She has history of hysterectomy for heavy menstrual periods in .     Past Medical History:   Diagnosis Date   • Breast cancer (CMS/HCC) 2019    LEFT BREAST   • Fibrocystic breast    • History of palpitations    • PONV (postoperative nausea and vomiting)    • Seasonal allergies       Past Surgical History:   Procedure Laterality Date   • BILATERAL BREAST REDUCTION Right 2019    Procedure: RIGHT REDUCTION FOR SYMMETRY;  Surgeon: PATRICIO Levine MD;  Location: [facility] OR;  Service: Plastics   • BREAST BIOPSY     • BREAST RECONSTRUCTION Left 8/15/2019    Procedure: LEFT PLACEMENT TISSUE EXPANDER WITH ALLORDERM;  Surgeon: PATRICIO Levine MD;  Location: [facility] OR;  Service: Plastics   • BREAST TISSUE EXPANDER REMOVAL INSERTION OF IMPLANT Bilateral 2019    Procedure: REMOVAL LEFT TISSUE EXPANDER AND PLACEMENT OF IMPLANT;  Surgeon: PATRICIO Levine MD;  Location: [facility] OR;  Service: Plastics   • BUNIONECTOMY Bilateral     BUNIONECTOMY- AND    • COLONOSCOPY N/A 3/15/2021    Procedure: COLONOSCOPY TO CECUM AND TI;  Surgeon: Heather Montero MD;  Location: [facility] ENDOSCOPY;  Service: Gastroenterology;  Laterality: N/A;  SCREENING  POST- HEMORRHOIDS   • DILATATION AND CURETTAGE     • DILATATION AND CURETTAGE      Vaginal warts   • HYSTERECTOMY  2008   • MASTECTOMY  "W/ SENTINEL NODE BIOPSY Left 8/15/2019    Procedure: LEFt total mastectomy, LEFT axilla sentinel node biopsy,  immediate recosntruction;  Surgeon: Sarah Barker MD;  Location: [facility] OR;  Service: General   • VAGINAL HYSTERECTOMY     • WISDOM TOOTH EXTRACTION        Social History     Tobacco Use   • Smoking status: Never Smoker   • Smokeless tobacco: Never Used   Substance Use Topics   • Alcohol use: Yes     Comment: 3 PER MONTH-SOCIALLY   • Drug use: No     OB History    Para Term  AB Living   2 2           SAB TAB Ectopic Molar Multiple Live Births                    # Outcome Date GA Lbr Jatinder/2nd Weight Sex Delivery Anes PTL Lv   2 Para            1 Para                 Review of Systems   Gastrointestinal: Positive for abdominal pain.   Genitourinary: Negative for vaginal bleeding and vaginal discharge.       OBJECTIVE:   Vitals:    21 1301   BP: 113/67   Weight: 81.2 kg (179 lb)   Height: 174 cm (68.5\")        Physical Exam  Vitals reviewed. Exam conducted with a chaperone present.   Constitutional:       General: She is not in acute distress.     Appearance: Normal appearance.   HENT:      Head: Normocephalic and atraumatic.      Right Ear: External ear normal.      Left Ear: External ear normal.   Eyes:      Extraocular Movements: Extraocular movements intact.      Pupils: Pupils are equal, round, and reactive to light.   Abdominal:      General: There is no distension.      Palpations: Abdomen is soft. There is no mass.      Tenderness: There is abdominal tenderness in the right lower quadrant and left lower quadrant. There is no guarding or rebound.      Hernia: No hernia is present.   Genitourinary:     General: Normal vulva.      Exam position: Lithotomy position.      Labia:         Right: No rash, tenderness, lesion or injury.         Left: No rash, tenderness, lesion or injury.       Urethra: No prolapse or urethral swelling.      Vagina: Normal. No vaginal discharge, " erythema, tenderness, bleeding or lesions.      Cervix: Normal.      Adnexa:         Right: Tenderness present. No mass or fullness.          Left: Tenderness present. No mass or fullness.        Comments: Mild tenderness of bilateral adnexa.   Musculoskeletal:         General: No deformity. Normal range of motion.      Cervical back: Normal range of motion and neck supple.   Lymphadenopathy:      Lower Body: No right inguinal adenopathy. No left inguinal adenopathy.   Skin:     General: Skin is warm and dry.   Neurological:      Mental Status: She is alert.   Psychiatric:         Mood and Affect: Mood normal.         Behavior: Behavior normal.       ASSESSMENT:     ICD-10-CM ICD-9-CM   1. Left ovarian cyst  N83.202 620.2   2. Right ovarian cyst  N83.201 620.2   3. Care related to current tamoxifen use  Z79.810 V07.51   4. Perimenopausal symptoms  N95.1 627.2       PLAN:   Patient underwent repeat ultrasound today to evaluate for left ovarian cyst and there was resolution of the left ovarian cyst but a simple right ovarian cyst measuring 2.8 x 3.4 cm was noted. We discussed that a side effect of tamoxifen is development of ovarian cyst in premenopausal/perimenopausal women. At this time, I recommend the patient return in 6-8 weeks to reevaluate the right ovarian cyst for resolution.   Advised the patient to discuss ongoing tamoxifen use with oncologist and if there is a different drug that she could be on at this time.   We discussed obtaining an estradiol and FSH level to evaluate for menopause but patient declined because she ultrasound does not suggest menopausal ovaries.   Patient indicated understanding and all questions answered.     See below for orders    Orders Placed This Encounter   Procedures   • US Non-ob Transvaginal     Order Specific Question:   Reason for Exam:     Answer:   left ovarian cyst      Follow up for repeat ultrasound in 6-8 weeks.    Gertrude Humphries MD

## 2021-06-03 NOTE — TELEPHONE ENCOUNTER
Caller: PT    Relationship: PT    Best call back number: [phone]    What is the best time to reach you:     Who are you requesting to speak with (clinical staff, provider,  specific staff member): CLINICAL    Do you know the name of the person who called:     What was the call regarding: PT HAS FORMED NEW CYST ON RIGHT OVARY. VERIFYING IF PREVENTATIVE MEASURES NEED TO BE DISCUSSED BEFORE 7/15.    Do you require a callback: YES

## 2021-06-10 ENCOUNTER — APPOINTMENT (OUTPATIENT)
Dept: WOMENS IMAGING | Facility: HOSPITAL | Age: 51
End: 2021-06-10

## 2021-06-10 PROCEDURE — 77063 BREAST TOMOSYNTHESIS BI: CPT | Performed by: RADIOLOGY

## 2021-06-10 PROCEDURE — 77067 SCR MAMMO BI INCL CAD: CPT | Performed by: RADIOLOGY

## 2021-06-14 NOTE — PROGRESS NOTES
Subjective     REASON FOR CONSULTATION:   1.Left breast cancer pT1cN 0- ER [place] positive HER-2 negative low-grade infiltrating ductal carcinoma Oncotype score of 6 postmastectomy and sentinel node biopsy on 8/20/19  2.  Oncotype score of 6 tamoxifen prescribed                               REQUESTING PHYSICIAN:  MD Dannielle Campa MD    History of Present Illness patient is a 49-year-old premenopausal woman with a small hormone positive breast cancer which is low-grade with a low Oncotype score.  Of 6    She has been on tamoxifen now for 24  months and was tolerating it well but then was hospitalized at [facility] in April with a ruptured cyst in her left ovary the cause significant pain and then has developed a fairly significant cyst on her right ovary.  Her gynecologist is given her 2 options-either have her ovaries removed or switch to another medication and with her situation with a very high estradiol level earlier this year I think it would be easier to remove her ovaries and then switch to a aromatase inhibitor  than to put her on Lupron indefinitely and she is okay with this    mammogram  was done last week and benign     she never had a colonoscopy and she finally had a done in March and it was benign    She had a hysterectomy and therefore does not need any Pap smears   .        Past Medical History:   Diagnosis Date   • Breast cancer (CMS/HCC) 05/2019    LEFT BREAST   • Fibrocystic breast    • History of palpitations    • PONV (postoperative nausea and vomiting)    • Seasonal allergies         Past Surgical History:   Procedure Laterality Date   • BILATERAL BREAST REDUCTION Right 12/19/2019    Procedure: RIGHT REDUCTION FOR SYMMETRY;  Surgeon: PATRICIO Levine MD;  Location: [facility];  Service: Plastics   • BREAST BIOPSY     • BREAST RECONSTRUCTION Left 8/15/2019    Procedure: LEFT PLACEMENT TISSUE EXPANDER WITH ALLORDERM;   Surgeon: PATRICIO Levine MD;  Location: [facility] OR;  Service: Plastics   • BREAST TISSUE EXPANDER REMOVAL INSERTION OF IMPLANT Bilateral 12/19/2019    Procedure: REMOVAL LEFT TISSUE EXPANDER AND PLACEMENT OF IMPLANT;  Surgeon: PATRICIO Levine MD;  Location: [facility] OR;  Service: Plastics   • BUNIONECTOMY Bilateral     BUNIONECTOMY-2005 AND 2015   • COLONOSCOPY N/A 3/15/2021    Procedure: COLONOSCOPY TO CECUM AND TI;  Surgeon: Heather Montero MD;  Location[facility] ENDOSCOPY;  Service: Gastroenterology;  Laterality: N/A;  SCREENING  POST- HEMORRHOIDS   • DILATATION AND CURETTAGE  1998   • DILATATION AND CURETTAGE  1991    Vaginal warts   • HYSTERECTOMY  08/2008   • MASTECTOMY W/ SENTINEL NODE BIOPSY Left 8/15/2019    Procedure: LEFt total mastectomy, LEFT axilla sentinel node biopsy,  immediate recosntruction;  Surgeon: Sarah Barker MD;  Location: [facility] OR;  Service: General   • VAGINAL HYSTERECTOMY     • WISDOM TOOTH EXTRACTION      ONC HISTTORY  patient is a 49-year-old -American female in excellent health on no chronic medications who was noted on her routine mammogram after 3-year break to have an abnormality in the left breast.  The patient thought she had some tenderness and showed this to her family doctor who sent her for a mammogram and this did show a definite difference from 3 years previously in that a12 mm abnormality was noted in her left breast at the 12:30 position.  Diagnostic mammogram showed in addition  that a to the lesion at 1230 there was another abnormality measuring 7 mm at the 1:00 region and several groups of indistinct calcifications were also noted in this area.  The ultrasound confirmed a solid mass measuring 11 x 10 at 1230 and a 4 x 4 millimeter mass at 4:00 in addition to 2 other cysts biopsy was done.on 6/26/2019 with the lesion at 12:00 showing atypical ductal hyperplasia measuring 1 mm negative for invasive cancer in the lesion at 1230 showed invasive  mammary carcinoma no special type low-grade measuring 9 mm associated with low-grade DCIS and microcalcifications tumor was 90% ER +94% [place] positive HER-2 negative low Ki-67 of 5.7%  Patient underwent bilateral breast MRI on 2019 and this showed the biopsy-proven mass at the 12 o'clock position measuring 1.5 cm residual mass in the 3 o'clock position marker from the ADH showed no suspicious residual enhancement.  There is no obvious adenopathy in the right breast was benign    Because of the multifocal nature of the disease the patient opted for a left mastectomy with immediate reconstruction and the final pathology specimen showed a 12 x 12 mm low-grade 1 invasive ductal carcinoma with 2-negative sentinel nodes.  There was associated DCIS measuring 8 x 6 mm and margins were all clear    Patient is  4 para 2 1 miscarriage and one  first childbirth was at age 23 she did not breast-feed she was on birth control for 1 year and has not had appeared since  when she had a hysterectomy for adenomyosis.  She is probably premenopausal as she has had no symptoms of menopause    Family history is completely negative for breast cancer ovarian cancer or any other malignancy that she is aware of father  at 60 of a heart attack mother 73 she has one brother who is healthy.    She is here today to discuss adjuvant treatment her Oncotype score came back at 6 Which I told her was excellent Which predicts no benefit from chemotherapy and distant recurrence at 9 years of 3% with hormonal blockade  We discussed tamoxifen as she is likely premenopausal and the side effect profile  The side effects and toxicities of Tamoxifen were discussed with the patient, including hot flashes, mood swings,depression, DVT and endometrial cancer. A list of drugs that interfere with the efficacy of tamoxifen and are to be avoided were given to the patient.  Estradiol FSH and LH levels have been drawn today to ensure that she is  premenopausal to confirm she is premenopausal  Discussed in detail the side effects of tamoxifen and the information from the Oncotype DX score and she is very happy to get away without chemotherapy and agreeable to tamoxifen but she will call me next week to check her menopausal status before taking it.  No radiation is planned    12/19   She has been on tamoxifen for about 3 months now and after the first month developed dizziness and somnolence and we asked her to cut back to 10 mg for a couple of weeks but she misunderstood and has stayed on 10 mg and is tolerating this fairly well overall.    When she was seen by Dr. Gooden and December 2019 she increased the tamoxifen back up to 20 mg and has been tolerating that well since that time.  She has noticed some increased tearfulness.  She does have upcoming nipple reconstruction and follows up with Dr. Levine tomorrow in preparation for this.  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        Current Outpatient Medications on File Prior to Visit   Medication Sig Dispense Refill   • loratadine-pseudoephedrine ([employer] Allergy Relief-D)  MG per 24 hr tablet Take 1 tablet by mouth Daily. 30 tablet 2   • Multiple Vitamin (MULTIVITAMIN) tablet Take 1 tablet by mouth Daily.     • Nutritional Supplements (VITAMIN D MAINTENANCE PO) Take  by mouth.     • tamoxifen (NOLVADEX) 20 MG chemo tablet Take 20 mg by mouth Daily.       No current facility-administered medications on file prior to visit.        ALLERGIES:    Allergies   Allergen Reactions   • Codeine Rash   • Percocet [Oxycodone-Acetaminophen] Itching        Social History     Socioeconomic History   • Marital status:      Spouse name: Not on file   • Number of children: Not on file   • Years of education: College   • Highest education  level: Not on file   Tobacco Use   • Smoking status: Never Smoker   • Smokeless tobacco: Never Used   Substance and Sexual Activity   • Alcohol use: Yes     Comment: 3 PER MONTH-SOCIALLY   • Drug use: No   • Sexual activity: Yes     Birth control/protection: Surgical        Family History   Problem Relation Age of Onset   • Diabetes Mother    • Heart disease Mother    • Hypertension Mother    • Hyperlipidemia Mother    • Cancer Maternal Aunt         OF UNKNOWN ORGIN   • Diabetes Maternal Aunt    • Hyperlipidemia Maternal Aunt    • Ovarian cancer Paternal Aunt    • Lung cancer Maternal Grandmother    • Brain cancer Maternal Grandmother    • Hypertension Father    • Heart attack Father    • Seizures Brother    • Malig Hyperthermia Neg Hx         Review of Systems   Constitutional: Negative.  Negative for appetite change, chills, diaphoresis, fatigue, fever and unexpected weight change.   HENT: Negative for hearing loss, sore throat and trouble swallowing.    Respiratory: Negative for cough, chest tightness, shortness of breath and wheezing.    Cardiovascular: Negative for chest pain (nerve like pain under left axilla intermittently), palpitations and leg swelling.   Gastrointestinal: Negative for abdominal distention, abdominal pain, diarrhea, nausea and vomiting.   Genitourinary: Negative for dysuria, frequency, hematuria and urgency.   Musculoskeletal: Negative for joint swelling.        No muscle weakness.   Skin: Negative for rash and wound.   Neurological: Negative for seizures, syncope, speech difficulty, weakness, numbness and headaches.   Hematological: Negative for adenopathy. Does not bruise/bleed easily.   Psychiatric/Behavioral: Negative for behavioral problems, confusion and suicidal ideas. The patient is not nervous/anxious.    All other systems reviewed and are negative.   I have reviewed and confirmed the accuracy of the ROS as documented by the MA/LPN/RN Tanner Gooden MD        Objective  "    Vitals:    06/18/21 0845   BP: 110/77   Pulse: 89   Resp: 16   Temp: 97.8 °F (36.6 °C)   TempSrc: Temporal   SpO2: 100%   Weight: 82.6 kg (182 lb 3.2 oz)   Height: 174 cm (68.5\")   PainSc: 0-No pain     Current Status 1/28/2021   ECOG score 0       Physical Exam   GENERAL:  Well-developed, well-nourished in no acute distress.   SKIN:  Warm, dry without rashes, purpura or petechiae.  EYES:  Pupils equal, round and reactive to light.  EOMs intact.  Conjunctivae normal.  EARS:  Hearing intact.  NOSE:  Septum midline.  No excoriations or nasal discharge.  MOUTH:  Tongue is well-papillated; no stomatitis or ulcers.  Lips normal.  THROAT:  Oropharynx without lesions or exudates.  NECK:  Supple with good range of motion; no thyromegaly or masses, no JVD.  LYMPHATICS:  No cervical, supraclavicular, axillary adenopathy.  CHEST:  Lungs clear to auscultation. Good airflow.  BREASTS: Right breast is benign left implant in place with incisions healed.  CARDIAC:  Regular rate and rhythm without murmurs, rubs or gallops. Normal S1,S2.  ABDOMEN:  Soft, left lower quadrant tender with no hepatosplenomegaly or masses.  EXTREMITIES:  No clubbing, cyanosis or edema.  NEUROLOGICAL:  Cranial Nerves II-XII grossly intact.  No focal neurological deficits.  PSYCHIATRIC:  Normal affect and mood.      I have reexamined the patient and the results are consistent with the previously documented exam. Tanner Gooden MD       RECENT LABS:  Hematology WBC   Date Value Ref Range Status   06/18/2021 6.93 3.40 - 10.80 10*3/mm3 Final     RBC   Date Value Ref Range Status   06/18/2021 4.31 3.77 - 5.28 10*6/mm3 Final     Hemoglobin   Date Value Ref Range Status   06/18/2021 12.6 12.0 - 15.9 g/dL Final     Hematocrit   Date Value Ref Range Status   06/18/2021 40.0 34.0 - 46.6 % Final     Platelets   Date Value Ref Range Status   06/18/2021 192 140 - 450 10*3/mm3 Final            Pathology          Study Result     BILATERAL BREAST MRI WITHOUT " AND WITH CONTRAST       IMPRESSION:  1. Biopsy-proven malignancy in the left breast at the 12 o'clock  position with the more posterior of the barrel-shaped metallic clip seen  centrally within the residual mass which measures on the order of 1.5 cm  in greatest dimension. The more anterior barrel-shaped metallic clips is  located within the hematoma. No other suspicious findings are seen  within the left breast and there is no evidence for left axillary  adenopathy.  2. The top hat shaped metallic clip at the 3 o'clock position  corresponds to the site of ADH and is not associated with any residual  suspicious enhancement. The metallic clip is seen within a dominant  hematoma cavity that measures on the order of 4 cm in greatest  dimension.  3. There are no findings suspicious for malignancy in the right breast.     BI-RADS Category 6: Known malignancy.     This report was finalized on 7/8/2019 11:51 AM by Dr. Harjinder Encinas M.D.               Tissue Pathology Exam: [id number]     Final Diagnosis   1. Right Breast, Reduction Mammoplasty (78.5 grams):               A. Benign skin and breast tissue.               B. No atypical hyperplasia, in situ nor invasive carcinoma identified.     swm/pkm    Electronically signed by Sujey Baig MD on 12/20/2019 at 1504               Assessment/Plan   1.  T1cN0 low-grade ER MS positive HER-2 negative infiltrating ductal carcinoma left breast with associated DCIS multifocal post mastectomy and sentinel node biopsy-Oncotype score of 6  · Tamoxifen prescribed in 9/19-patient cut to 10 mg after 1 month and stayed on this dose till 12/19 when escalated to 20 mg  · Patient continues the tamoxifen 20 mg daily.    2.   negative 9 gene invitae panel testing    3.  Increased tearfulness.  Improved    4.?  Blood in stool needs colonoscopy    Plan  1.  Proceed with oophrectomy and then resume tamoxifen 20 mg daily  2.  Follow-up with me  in 2 months.        She tells me her  insurance company does not pay for nurse practitioners and therefore we will have to make sure that she is not scheduled with 1 of them

## 2021-06-18 ENCOUNTER — OFFICE VISIT (OUTPATIENT)
Dept: ONCOLOGY | Facility: CLINIC | Age: 51
End: 2021-06-18

## 2021-06-18 ENCOUNTER — LAB (OUTPATIENT)
Dept: LAB | Facility: HOSPITAL | Age: 51
End: 2021-06-18

## 2021-06-18 VITALS
BODY MASS INDEX: 26.98 KG/M2 | WEIGHT: 182.2 LBS | TEMPERATURE: 97.8 F | RESPIRATION RATE: 16 BRPM | SYSTOLIC BLOOD PRESSURE: 110 MMHG | HEIGHT: 69 IN | HEART RATE: 89 BPM | OXYGEN SATURATION: 100 % | DIASTOLIC BLOOD PRESSURE: 77 MMHG

## 2021-06-18 DIAGNOSIS — C50.412 MALIGNANT NEOPLASM OF UPPER-OUTER QUADRANT OF LEFT BREAST IN FEMALE, ESTROGEN RECEPTOR POSITIVE (HCC): Primary | ICD-10-CM

## 2021-06-18 DIAGNOSIS — C50.412 MALIGNANT NEOPLASM OF UPPER-OUTER QUADRANT OF LEFT BREAST IN FEMALE, ESTROGEN RECEPTOR POSITIVE (HCC): ICD-10-CM

## 2021-06-18 DIAGNOSIS — N83.202 LEFT OVARIAN CYST: ICD-10-CM

## 2021-06-18 DIAGNOSIS — Z17.0 MALIGNANT NEOPLASM OF UPPER-OUTER QUADRANT OF LEFT BREAST IN FEMALE, ESTROGEN RECEPTOR POSITIVE (HCC): Primary | ICD-10-CM

## 2021-06-18 DIAGNOSIS — Z17.0 MALIGNANT NEOPLASM OF UPPER-OUTER QUADRANT OF LEFT BREAST IN FEMALE, ESTROGEN RECEPTOR POSITIVE (HCC): ICD-10-CM

## 2021-06-18 LAB
ALBUMIN SERPL-MCNC: 3.4 G/DL (ref 3.5–5.2)
ALBUMIN/GLOB SERPL: 1.1 G/DL (ref 1.1–2.4)
ALP SERPL-CCNC: 53 U/L (ref 38–116)
ALT SERPL W P-5'-P-CCNC: 8 U/L (ref 0–33)
ANION GAP SERPL CALCULATED.3IONS-SCNC: 11 MMOL/L (ref 5–15)
AST SERPL-CCNC: 15 U/L (ref 0–32)
BASOPHILS # BLD AUTO: 0.05 10*3/MM3 (ref 0–0.2)
BASOPHILS NFR BLD AUTO: 0.7 % (ref 0–1.5)
BILIRUB SERPL-MCNC: 0.2 MG/DL (ref 0.2–1.2)
BUN SERPL-MCNC: 10 MG/DL (ref 6–20)
BUN/CREAT SERPL: 12.3 (ref 7.3–30)
CALCIUM SPEC-SCNC: 8.9 MG/DL (ref 8.5–10.2)
CHLORIDE SERPL-SCNC: 111 MMOL/L (ref 98–107)
CO2 SERPL-SCNC: 20 MMOL/L (ref 22–29)
CREAT SERPL-MCNC: 0.81 MG/DL (ref 0.6–1.1)
DEPRECATED RDW RBC AUTO: 44.7 FL (ref 37–54)
EOSINOPHIL # BLD AUTO: 0.08 10*3/MM3 (ref 0–0.4)
EOSINOPHIL NFR BLD AUTO: 1.2 % (ref 0.3–6.2)
ERYTHROCYTE [DISTWIDTH] IN BLOOD BY AUTOMATED COUNT: 13.2 % (ref 12.3–15.4)
GFR SERPL CREATININE-BSD FRML MDRD: 90 ML/MIN/1.73
GLOBULIN UR ELPH-MCNC: 3 GM/DL (ref 1.8–3.5)
GLUCOSE SERPL-MCNC: 103 MG/DL (ref 74–124)
HCT VFR BLD AUTO: 40 % (ref 34–46.6)
HGB BLD-MCNC: 12.6 G/DL (ref 12–15.9)
IMM GRANULOCYTES # BLD AUTO: 0.03 10*3/MM3 (ref 0–0.05)
IMM GRANULOCYTES NFR BLD AUTO: 0.4 % (ref 0–0.5)
LYMPHOCYTES # BLD AUTO: 2.53 10*3/MM3 (ref 0.7–3.1)
LYMPHOCYTES NFR BLD AUTO: 36.5 % (ref 19.6–45.3)
MCH RBC QN AUTO: 29.2 PG (ref 26.6–33)
MCHC RBC AUTO-ENTMCNC: 31.5 G/DL (ref 31.5–35.7)
MCV RBC AUTO: 92.8 FL (ref 79–97)
MONOCYTES # BLD AUTO: 0.57 10*3/MM3 (ref 0.1–0.9)
MONOCYTES NFR BLD AUTO: 8.2 % (ref 5–12)
NEUTROPHILS NFR BLD AUTO: 3.67 10*3/MM3 (ref 1.7–7)
NEUTROPHILS NFR BLD AUTO: 53 % (ref 42.7–76)
NRBC BLD AUTO-RTO: 0 /100 WBC (ref 0–0.2)
PLATELET # BLD AUTO: 192 10*3/MM3 (ref 140–450)
PMV BLD AUTO: 10.9 FL (ref 6–12)
POTASSIUM SERPL-SCNC: 4.6 MMOL/L (ref 3.5–4.7)
PROT SERPL-MCNC: 6.4 G/DL (ref 6.3–8)
RBC # BLD AUTO: 4.31 10*6/MM3 (ref 3.77–5.28)
SODIUM SERPL-SCNC: 142 MMOL/L (ref 134–145)
WBC # BLD AUTO: 6.93 10*3/MM3 (ref 3.4–10.8)

## 2021-06-18 PROCEDURE — 85025 COMPLETE CBC W/AUTO DIFF WBC: CPT

## 2021-06-18 PROCEDURE — 36415 COLL VENOUS BLD VENIPUNCTURE: CPT

## 2021-06-18 PROCEDURE — 99214 OFFICE O/P EST MOD 30 MIN: CPT | Performed by: INTERNAL MEDICINE

## 2021-06-18 PROCEDURE — 80053 COMPREHEN METABOLIC PANEL: CPT

## 2021-07-02 ENCOUNTER — TELEPHONE (OUTPATIENT)
Dept: OBSTETRICS AND GYNECOLOGY | Facility: CLINIC | Age: 51
End: 2021-07-02

## 2021-07-02 NOTE — TELEPHONE ENCOUNTER
Dr Humphries,    Pt wants to let you know after speaking with oncologist they have decided that she should have her ovaries removed.  Does pt need to be scheduled for a surgery consult or a follow up visit?    Thanks,Malorie

## 2021-07-06 ENCOUNTER — TELEPHONE (OUTPATIENT)
Dept: OBSTETRICS AND GYNECOLOGY | Facility: CLINIC | Age: 51
End: 2021-07-06

## 2021-07-07 ENCOUNTER — OFFICE VISIT (OUTPATIENT)
Dept: OBSTETRICS AND GYNECOLOGY | Facility: CLINIC | Age: 51
End: 2021-07-07

## 2021-07-07 VITALS
BODY MASS INDEX: 26.96 KG/M2 | SYSTOLIC BLOOD PRESSURE: 124 MMHG | HEIGHT: 69 IN | DIASTOLIC BLOOD PRESSURE: 76 MMHG | WEIGHT: 182 LBS

## 2021-07-07 DIAGNOSIS — N83.202 BILATERAL OVARIAN CYSTS: ICD-10-CM

## 2021-07-07 DIAGNOSIS — N83.201 BILATERAL OVARIAN CYSTS: ICD-10-CM

## 2021-07-07 DIAGNOSIS — Z85.3 HISTORY OF BREAST CANCER: Primary | ICD-10-CM

## 2021-07-07 DIAGNOSIS — Z01.818 PREOPERATIVE EXAM FOR GYNECOLOGIC SURGERY: ICD-10-CM

## 2021-07-07 PROCEDURE — 99213 OFFICE O/P EST LOW 20 MIN: CPT | Performed by: STUDENT IN AN ORGANIZED HEALTH CARE EDUCATION/TRAINING PROGRAM

## 2021-07-07 NOTE — PROGRESS NOTES
Chief Complaint   Patient presents with   • Follow-up     surgery consult     HPI:  Екатерина Strauss is a 51 y.o.  who presents to discuss surgery. The patient has history of left breast cancer ER/LA positive s/p treatment with left mastectomy and was taking tamoxifen but has recently stopped due to recurrent ovarian cysts. She was admitted to [facility] in April for left lower quadrant pain that was presumed to be secondary to ruptured left ovarian cyst and hemoperitoneum. She was seen in clinic on 6/3/21 for follow up and the left ovarian cyst was notably resolving but she now had a simple right ovary cyst measuring 2.8 x 3.4 cm. We had discussed possible removal of her ovaries at that time and she spoke with her oncologist regarding removing her ovaries given recurrent cysts and reduce her risk of recurrence. The patient wishes to pursue removal of ovaries at this time.     The patient has history of vaginal hysterectomy.     Past Medical History:   Diagnosis Date   • Breast cancer (CMS/HCC) 2019    LEFT BREAST   • Fibrocystic breast    • History of palpitations    • PONV (postoperative nausea and vomiting)    • Seasonal allergies      Past Surgical History:   Procedure Laterality Date   • BILATERAL BREAST REDUCTION Right 2019    Procedure: RIGHT REDUCTION FOR SYMMETRY;  Surgeon: PATRICIO Levine MD;  Location: [facility] OR;  Service: Plastics   • BREAST BIOPSY     • BREAST RECONSTRUCTION Left 8/15/2019    Procedure: LEFT PLACEMENT TISSUE EXPANDER WITH ALLORDERM;  Surgeon: PATRICIO Levine MD;  Location: [facility] OR;  Service: Plastics   • BREAST TISSUE EXPANDER REMOVAL INSERTION OF IMPLANT Bilateral 2019    Procedure: REMOVAL LEFT TISSUE EXPANDER AND PLACEMENT OF IMPLANT;  Surgeon: PATRICIO Levine MD;  Location: [facility] OR;  Service: Plastics   • BUNIONECTOMY Bilateral     BUNIONECTOMY- AND    • COLONOSCOPY N/A 3/15/2021    Procedure: COLONOSCOPY TO CECUM AND TI;   "Surgeon: Heather Montero MD;  Location: [facility] ENDOSCOPY;  Service: Gastroenterology;  Laterality: N/A;  SCREENING  POST- HEMORRHOIDS   • DILATATION AND CURETTAGE  1998   • DILATATION AND CURETTAGE  1991    Vaginal warts   • HYSTERECTOMY  08/2008   • MASTECTOMY W/ SENTINEL NODE BIOPSY Left 8/15/2019    Procedure: LEFt total mastectomy, LEFT axilla sentinel node biopsy,  immediate recosntruction;  Surgeon: Sarah Barker MD;  Location: [facility] MAIN OR;  Service: General   • VAGINAL HYSTERECTOMY     • WISDOM TOOTH EXTRACTION       Social History     Tobacco Use   • Smoking status: Never Smoker   • Smokeless tobacco: Never Used   Substance Use Topics   • Alcohol use: Yes     Comment: 3 PER MONTH-SOCIALLY   • Drug use: No     Family History   Problem Relation Age of Onset   • Diabetes Mother    • Heart disease Mother    • Hypertension Mother    • Hyperlipidemia Mother    • Cancer Maternal Aunt         OF UNKNOWN ORGIN   • Diabetes Maternal Aunt    • Hyperlipidemia Maternal Aunt    • Ovarian cancer Paternal Aunt    • Lung cancer Maternal Grandmother    • Brain cancer Maternal Grandmother    • Hypertension Father    • Heart attack Father    • Seizures Brother    • Malig Hyperthermia Neg Hx        Review of Systems   Gastrointestinal: Positive for abdominal pain.   All other systems reviewed and are negative.      OBJECTIVE:   Vitals:    07/07/21 1444   BP: 124/76   Weight: 82.6 kg (182 lb)   Height: 174 cm (68.5\")      Physical Exam  Vitals reviewed.   Constitutional:       General: She is not in acute distress.     Appearance: Normal appearance.   HENT:      Head: Normocephalic and atraumatic.      Right Ear: External ear normal.      Left Ear: External ear normal.   Eyes:      Extraocular Movements: Extraocular movements intact.      Pupils: Pupils are equal, round, and reactive to light.   Pulmonary:      Effort: Pulmonary effort is normal. No respiratory distress.   Abdominal:      General: There is no " distension.      Palpations: Abdomen is soft.      Tenderness: There is no abdominal tenderness. There is no guarding or rebound.   Musculoskeletal:         General: No deformity. Normal range of motion.      Cervical back: Normal range of motion and neck supple.   Skin:     General: Skin is warm and dry.   Neurological:      General: No focal deficit present.      Mental Status: She is alert and oriented to person, place, and time.   Psychiatric:         Mood and Affect: Mood normal.         Behavior: Behavior normal.         Lab Results   Component Value Date    WBC 6.93 06/18/2021    HGB 12.6 06/18/2021    HCT 40.0 06/18/2021    MCV 92.8 06/18/2021     06/18/2021        ASSESSMENT:   1. History of breast cancer  2. Bilateral ovarian cysts     PLAN:   We reviewed options of continued monitoring of ovarian cysts with ultrasound vs laparoscopic bilateral salpingo-oophorectomy.   After discussing risks and benefits of each alternative, patient would like to proceed with laparoscopic bilateral salpingo-oophorectomy.   She is understanding that this procedure involves removal of ovaries and uterine tubes and the risks include but are not limited to infection, bleeding that could require a blood transfusion, and damage to surrounding structures included but not limited to nerves, blood vessels, bowel, bladder, or ureters.    She was verbally consented for the procedure and had all questions answered to her apparent satisfaction upon completion of the discussion.    If she were to need blood or a blood transfusion she is accepting of this.  She has the following medical problems of concern: She has history of breast cancer, currently off tamoxifen.   Preoperatively, she will need CBC, UPT  Plan to follow up 2 weeks following surgery.     Gertrude Humphries MD

## 2021-07-07 NOTE — PATIENT INSTRUCTIONS
Bilateral Salpingo-Oophorectomy    Bilateral salpingo-oophorectomy is the surgical removal of both fallopian tubes and both ovaries. The ovaries are reproductive organs that produce eggs in women. The fallopian tubes allow eggs to move from the ovaries to the uterus.  You may need this procedure if you:  · Have had your uterus removed. This procedure is usually done after the uterus is removed.  · Have cancer of the fallopian tubes or ovaries.  · Have a high risk of cancer of the fallopian tubes or ovaries.  There are three different techniques that can be used for this procedure:  · Open. One large incision will be made in your abdomen.  · Laparoscopic. A thin, lighted tube with a small camera on the end (laparoscope) will be used to help perform the procedure. The laparoscope will allow your surgeon to make several small incisions in the abdomen instead of one large incision.  · Robot-assisted. A computer will be used to control surgical instruments that are attached to robotic arms. A laparoscope may also be used with this technique.  As a result of this procedure, you will become sterile (unable to become pregnant), and you will go into menopause (no longer able to have menstrual periods). You may develop symptoms of menopause such as hot flashes, night sweats, and mood changes. Your sex drive may also be affected.  Tell a health care provider about:  · Any allergies you have.  · All medicines you are taking, including vitamins, herbs, eye drops, creams, and over-the-counter medicines.  · Any problems you or family members have had with anesthetic medicines.  · Any blood disorders you have.  · Any surgeries you have had.  · Any medical conditions you have.  · Whether you are pregnant or may be pregnant.  What are the risks?  Generally, this is a safe procedure. However, problems may occur, including:  · Infection.  · Bleeding.  · Allergic reactions to medicines.  · Damage to other structures or organs.  · Blood  clots in the legs or lungs.  What happens before the procedure?  Staying hydrated  Follow instructions from your health care provider about hydration, which may include:  · Up to 2 hours before the procedure - you may continue to drink clear liquids, such as water, clear fruit juice, black coffee, and plain tea.    Eating and drinking restrictions  Follow instructions from your health care provider about eating and drinking, which may include:  · 8 hours before the procedure - stop eating heavy meals or foods such as meat, fried foods, or fatty foods.  · 6 hours before the procedure - stop eating light meals or foods, such as toast or cereal.  · 6 hours before the procedure - stop drinking milk or drinks that contain milk.  · 2 hours before the procedure - stop drinking clear liquids.  Medicines  · Ask your health care provider about:  ? Changing or stopping your regular medicines. This is especially important if you are taking diabetes medicines or blood thinners.  ? Taking medicines such as aspirin and ibuprofen. These medicines can thin your blood. Do not take these medicines before your procedure if your health care provider instructs you not to.  · You may be given antibiotic medicine to help prevent infection.  General instructions  · Do not smoke for at least 2 weeks before your procedure or as told by your health care provider.  · You may have an exam or testing.  · You may have a blood or urine sample taken.  · Ask your health care provider how your surgical site will be marked or identified.  · Plan to have someone take you home from the hospital.  · If you will be going home right after the procedure, plan to have someone with you for 24 hours.  What happens during the procedure?  · To reduce your risk of infection:  ? Your health care team will wash or sanitize their hands.  ? Your skin will be washed with soap.  ? Hair may be removed from the surgical area.  · An IV tube will be inserted into one of  your veins.  · You will be given one or more of the following:  ? A medicine to help you relax (sedative).  ? A medicine to make you fall asleep (general anesthetic).  · A thin tube (catheter) will be inserted through your urethra and into your bladder. The catheter drains urine during your procedure.  · Depending on the type of surgery you are having, your surgeon will do one of the following:  ? Make one incision in your abdomen (open surgery).  ? Make two small incisions in your abdomen (laparoscopic surgery). The laparoscope will be passed through one incision, and surgical instruments will be passed through the other.  ? Make several small incisions in your abdomen (robot-assisted surgery). A laparoscope and other surgical instruments may be passed through the incisions.  · Your fallopian tubes and ovaries will be cut away from the uterus and removed.  · Your blood vessels will be clamped and tied to prevent too much bleeding.  · The incision(s) in your abdomen will be closed with stitches (sutures) or staples.  · A bandage (dressing) may be placed over your incision(s).  The procedure may vary among health care providers and hospitals.  What happens after the procedure?  · Your blood pressure, heart rate, breathing rate, and blood oxygen level will be monitored until the medicines you were given have worn off.  · You may continue to receive fluids and medicines through an IV tube.  · You may continue to have a catheter draining your urine.  · You may have to wear compression stockings. These stockings help to prevent blood clots and reduce swelling in your legs.  · You will be given pain medicine as needed.  · Do not drive for 24 hours if you received a sedative.  Summary  · Bilateral salpingo-oophorectomy is a procedure to remove both fallopian tubes and both ovaries.  · There are three different techniques that can be used for this procedure, including open, laparoscopic, and robotic. Talk with your health  care provider about how your procedure will be done.  · As a result of this procedure, you will become sterile and you will go into menopause.  · Plan to have someone take you home from the hospital.  This information is not intended to replace advice given to you by your health care provider. Make sure you discuss any questions you have with your health care provider.  Document Revised: 02/21/2020 Document Reviewed: 01/22/2018  Elsevier Patient Education © 2021 [employer]

## 2021-07-19 PROBLEM — N83.202 BILATERAL OVARIAN CYSTS: Status: ACTIVE | Noted: 2021-07-19

## 2021-07-19 PROBLEM — Z85.3 HISTORY OF BREAST CANCER: Status: ACTIVE | Noted: 2021-07-19

## 2021-07-19 PROBLEM — N83.201 BILATERAL OVARIAN CYSTS: Status: ACTIVE | Noted: 2021-07-19

## 2021-07-20 ENCOUNTER — TRANSCRIBE ORDERS (OUTPATIENT)
Dept: OBSTETRICS AND GYNECOLOGY | Facility: CLINIC | Age: 51
End: 2021-07-20

## 2021-07-20 DIAGNOSIS — N83.201 BILATERAL OVARIAN CYSTS: Primary | ICD-10-CM

## 2021-07-20 DIAGNOSIS — N83.202 BILATERAL OVARIAN CYSTS: Primary | ICD-10-CM

## 2021-07-28 DIAGNOSIS — J30.1 SEASONAL ALLERGIC RHINITIS DUE TO POLLEN: Primary | ICD-10-CM

## 2021-07-28 RX ORDER — NICOTINE 14MG/24HR
1 PATCH, TRANSDERMAL 24 HOURS TRANSDERMAL DAILY
Qty: 30 TABLET | Refills: 2 | Status: SHIPPED | OUTPATIENT
Start: 2021-07-28 | End: 2021-11-09 | Stop reason: SDUPTHER

## 2021-08-09 ENCOUNTER — PRE-ADMISSION TESTING (OUTPATIENT)
Dept: PREADMISSION TESTING | Facility: HOSPITAL | Age: 51
End: 2021-08-09

## 2021-08-09 VITALS
BODY MASS INDEX: 27.25 KG/M2 | WEIGHT: 184 LBS | SYSTOLIC BLOOD PRESSURE: 114 MMHG | RESPIRATION RATE: 16 BRPM | HEIGHT: 69 IN | HEART RATE: 94 BPM | TEMPERATURE: 98.4 F | OXYGEN SATURATION: 100 % | DIASTOLIC BLOOD PRESSURE: 74 MMHG

## 2021-08-09 LAB — SARS-COV-2 ORF1AB RESP QL NAA+PROBE: NOT DETECTED

## 2021-08-09 PROCEDURE — C9803 HOPD COVID-19 SPEC COLLECT: HCPCS | Performed by: NURSE PRACTITIONER

## 2021-08-09 PROCEDURE — U0004 COV-19 TEST NON-CDC HGH THRU: HCPCS | Performed by: NURSE PRACTITIONER

## 2021-08-09 PROCEDURE — 85025 COMPLETE CBC W/AUTO DIFF WBC: CPT | Performed by: STUDENT IN AN ORGANIZED HEALTH CARE EDUCATION/TRAINING PROGRAM

## 2021-08-09 NOTE — DISCHARGE INSTRUCTIONS
Take the following medications the morning of surgery:    NONE    ARRIVE TO OUTPT SURGERY AT 7:30 AM ON 8/11/2021      If you are on prescription narcotic pain medication to control your pain you may also take that medication the morning of surgery.    General Instructions:  • Do not eat solid food after midnight the night before surgery.  • You may drink clear liquids day of surgery but must stop at least one hour before your hospital arrival time.  • It is beneficial for you to have a clear drink that contains carbohydrates the day of surgery.  We suggest a 12 to 20 ounce bottle of Gatorade or Powerade for non-diabetic patients or a 12 to 20 ounce bottle of G2 or Powerade Zero for diabetic patients. (Pediatric patients, are not advised to drink a 12 to 20 ounce carbohydrate drink)    Clear liquids are liquids you can see through.  Nothing red in color.     Plain water                               Sports drinks  Sodas                                   Gelatin (Jell-O)  Fruit juices without pulp such as white grape juice and apple juice  Popsicles that contain no fruit or yogurt  Tea or coffee (no cream or milk added)  Gatorade / Powerade  G2 / Powerade Zero    • Infants may have breast milk up to four hours before surgery.  • Infants drinking formula may drink formula up to six hours before surgery.   • Patients who avoid smoking, chewing tobacco and alcohol for 4 weeks prior to surgery have a reduced risk of post-operative complications.  Quit smoking as many days before surgery as you can.  • Do not smoke, use chewing tobacco or drink alcohol the day of surgery.   • If applicable bring your C-PAP/ BI-PAP machine.  • Bring any papers given to you in the doctor’s office.  • Wear clean comfortable clothes.  • Do not wear contact lenses, false eyelashes or make-up.  Bring a case for your glasses.   • Bring crutches or walker if applicable.  • Remove all piercings.  Leave jewelry and any other valuables at  home.  • Hair extensions with metal clips must be removed prior to surgery.  • The Pre-Admission Testing nurse will instruct you to bring medications if unable to obtain an accurate list in Pre-Admission Testing.            Preventing a Surgical Site Infection:  • For 2 to 3 days before surgery, avoid shaving with a razor because the razor can irritate skin and make it easier to develop an infection.    • Any areas of open skin can increase the risk of a post-operative wound infection by allowing bacteria to enter and travel throughout the body.  Notify your surgeon if you have any skin wounds / rashes even if it is not near the expected surgical site.  The area will need assessed to determine if surgery should be delayed until it is healed.  • The night prior to surgery shower using a fresh bar of anti-bacterial soap (such as Dial) and clean washcloth.  Sleep in a clean bed with clean clothing.  Do not allow pets to sleep with you.  • Shower on the morning of surgery using a fresh bar of anti-bacterial soap (such as Dial) and clean washcloth.  Dry with a clean towel and dress in clean clothing.  • Ask your surgeon if you will be receiving antibiotics prior to surgery.  • Make sure you, your family, and all healthcare providers clean their hands with soap and water or an alcohol based hand  before caring for you or your wound.    Day of surgery:  Your arrival time is approximately two hours before your scheduled surgery time.  Upon arrival, a Pre-op nurse and Anesthesiologist will review your health history, obtain vital signs, and answer questions you may have.  The only belongings needed at this time will be a list of your home medications and if applicable your C-PAP/BI-PAP machine.  A Pre-op nurse will start an IV and you may receive medication in preparation for surgery, including something to help you relax.     Please be aware that surgery does come with discomfort.  We want to make every effort to  control your discomfort so please discuss any uncontrolled symptoms with your nurse.   Your doctor will most likely have prescribed pain medications.      If you are going home after surgery you will receive individualized written care instructions before being discharged.  A responsible adult must drive you to and from the hospital on the day of your surgery and stay with you for 24 hours.  Discharge prescriptions can be filled by the hospital pharmacy during regular pharmacy hours.  If you are having surgery late in the day/evening your prescription may be e-prescribed to your pharmacy.  Please verify your pharmacy hours or chose a 24 hour pharmacy to avoid not having access to your prescription because your pharmacy has closed for the day.    If you are staying overnight following surgery, you will be transported to your hospital room following the recovery period.  [facility] has all private rooms.    If you have any questions please call Pre-Admission Testing at [phone].  Deductibles and co-payments are collected on the day of service. Please be prepared to pay the required co-pay, deductible or deposit on the day of service as defined by your plan.    Patient Education for Self-Quarantine Process    • Following your COVID testing, we strongly recommend that you wear a mask when you are with other people and practice social distancing.   • Limit your activities to only required outings.  • Wash your hands with soap and water frequently for at least 20 seconds.   • Avoid touching your eyes, nose and mouth with unwashed hands.  • Do not share anything - utensils, drinking glasses, food from the same bowl.   • Sanitize household surfaces daily. Include all high touch areas (door handles, light switches, phones, countertops, etc.)    Call your surgeon immediately if you experience any of the following symptoms:  • Sore Throat  • Shortness of Breath or difficulty  breathing  • Cough  • Chills  • Body soreness or muscle pain  • Headache  • Fever  • New loss of taste or smell  • Do not arrive for your surgery ill.  Your procedure will need to be rescheduled to another time.  You will need to call your physician before the day of surgery to avoid any unnecessary exposure to hospital staff as well as other patients.

## 2021-08-10 ENCOUNTER — ANESTHESIA EVENT (OUTPATIENT)
Dept: PERIOP | Facility: HOSPITAL | Age: 51
End: 2021-08-10

## 2021-08-10 NOTE — PROGRESS NOTES
Secondary to CHF and COPD exacerbation, with pneumonia  Appears to have improved, currently saturating adequately on room air  On BiPAP q h s  But reluctant to continue due to claustrophobia  ABG noted off BiPAP without any hypercarbia, sleep screen noted with significantly elevated AHI  Pulmonary follow-up appreciated  · Discussed with patient regarding outpatient sleep study, patient is agreeable  · Monitor respiratory status  · Pulmonary follow-up after discharge  · Can continue CPAP q h s  as tolerated, discussed with patient    Reluctant to use at present but will follow-up for the outpatient sleep study Екатерина came by the [facility] after her post op appointment with Dr. Barker. She stated she was doing well physically and emotionally. She had one more drain in place that plastic surgery is following. We discussed her support system and she stated she has numerous friends and family who have been immensely helpful to her during this time. She stated she has a few girl friends who have had breast cancer and have been helping her though this time. She stated she struggled with image post operatively and has been working through these emotions. We discussed that we have counseling services available through our [facility]. She declined the need for that at present, but was thankful to know it was available.     We discussed integrative therapies and other services at the [facility]. I gave her a navigation folder with the following information:    [facility] Cancer Support Network, Sharing Our Stories Breast Cancer Support Group, Cancer and Restorative Exercise (CARE), [facility] Exercise program, Together for Breast Cancer Survival, For Women Facing Breast Cancer, Road to Recovery, Bio[employer]eance, [facility], Massage Therapy, Reiki Therapy, [employer] [place], Cancer Nutrition, Survivorship Clinic, and Cancer and Career.     She verbalized appreciation for navigational services and she has my contact information and will call with any questions that arise.

## 2021-08-11 ENCOUNTER — ANESTHESIA (OUTPATIENT)
Dept: PERIOP | Facility: HOSPITAL | Age: 51
End: 2021-08-11

## 2021-08-11 ENCOUNTER — HOSPITAL ENCOUNTER (OUTPATIENT)
Facility: HOSPITAL | Age: 51
Setting detail: HOSPITAL OUTPATIENT SURGERY
Discharge: HOME | End: 2021-08-11
Attending: STUDENT IN AN ORGANIZED HEALTH CARE EDUCATION/TRAINING PROGRAM | Admitting: STUDENT IN AN ORGANIZED HEALTH CARE EDUCATION/TRAINING PROGRAM

## 2021-08-11 VITALS
TEMPERATURE: 97.8 F | HEART RATE: 84 BPM | OXYGEN SATURATION: 97 % | DIASTOLIC BLOOD PRESSURE: 78 MMHG | SYSTOLIC BLOOD PRESSURE: 120 MMHG | RESPIRATION RATE: 18 BRPM

## 2021-08-11 DIAGNOSIS — Z85.3 HISTORY OF BREAST CANCER: ICD-10-CM

## 2021-08-11 DIAGNOSIS — N83.201 BILATERAL OVARIAN CYSTS: ICD-10-CM

## 2021-08-11 DIAGNOSIS — N83.202 BILATERAL OVARIAN CYSTS: ICD-10-CM

## 2021-08-11 PROCEDURE — 25010000002 DEXAMETHASONE PER 1 MG: Performed by: NURSE ANESTHETIST, CERTIFIED REGISTERED

## 2021-08-11 PROCEDURE — 25010000002 NEOSTIGMINE 5 MG/10ML SOLUTION: Performed by: NURSE ANESTHETIST, CERTIFIED REGISTERED

## 2021-08-11 PROCEDURE — 25010000002 KETOROLAC TROMETHAMINE PER 15 MG: Performed by: NURSE ANESTHETIST, CERTIFIED REGISTERED

## 2021-08-11 PROCEDURE — 25010000002 FENTANYL CITRATE (PF) 50 MCG/ML SOLUTION: Performed by: NURSE ANESTHETIST, CERTIFIED REGISTERED

## 2021-08-11 PROCEDURE — S0260 H&P FOR SURGERY: HCPCS | Performed by: STUDENT IN AN ORGANIZED HEALTH CARE EDUCATION/TRAINING PROGRAM

## 2021-08-11 PROCEDURE — 25010000002 PROPOFOL 10 MG/ML EMULSION: Performed by: NURSE ANESTHETIST, CERTIFIED REGISTERED

## 2021-08-11 PROCEDURE — 88305 TISSUE EXAM BY PATHOLOGIST: CPT | Performed by: STUDENT IN AN ORGANIZED HEALTH CARE EDUCATION/TRAINING PROGRAM

## 2021-08-11 PROCEDURE — 25010000002 ONDANSETRON PER 1 MG: Performed by: NURSE ANESTHETIST, CERTIFIED REGISTERED

## 2021-08-11 PROCEDURE — 58661 LAPAROSCOPY REMOVE ADNEXA: CPT | Performed by: STUDENT IN AN ORGANIZED HEALTH CARE EDUCATION/TRAINING PROGRAM

## 2021-08-11 PROCEDURE — 88307 TISSUE EXAM BY PATHOLOGIST: CPT | Performed by: STUDENT IN AN ORGANIZED HEALTH CARE EDUCATION/TRAINING PROGRAM

## 2021-08-11 PROCEDURE — 25010000002 HYDROMORPHONE PER 4 MG: Performed by: NURSE ANESTHETIST, CERTIFIED REGISTERED

## 2021-08-11 PROCEDURE — 58662 LAPAROSCOPY EXCISE LESIONS: CPT | Performed by: STUDENT IN AN ORGANIZED HEALTH CARE EDUCATION/TRAINING PROGRAM

## 2021-08-11 RX ORDER — HYDROCODONE BITARTRATE AND ACETAMINOPHEN 7.5; 325 MG/1; MG/1
1 TABLET ORAL EVERY 4 HOURS PRN
Status: DISCONTINUED | OUTPATIENT
Start: 2021-08-11 | End: 2021-08-11 | Stop reason: HOSPADM

## 2021-08-11 RX ORDER — DEXAMETHASONE SODIUM PHOSPHATE 10 MG/ML
INJECTION INTRAMUSCULAR; INTRAVENOUS AS NEEDED
Status: DISCONTINUED | OUTPATIENT
Start: 2021-08-11 | End: 2021-08-11 | Stop reason: SURG

## 2021-08-11 RX ORDER — GLYCOPYRROLATE 0.2 MG/ML
INJECTION INTRAMUSCULAR; INTRAVENOUS AS NEEDED
Status: DISCONTINUED | OUTPATIENT
Start: 2021-08-11 | End: 2021-08-11 | Stop reason: SURG

## 2021-08-11 RX ORDER — IBUPROFEN 800 MG/1
800 TABLET ORAL EVERY 6 HOURS PRN
Qty: 30 TABLET | Refills: 1 | OUTPATIENT
Start: 2021-08-11 | End: 2021-10-28

## 2021-08-11 RX ORDER — GLYCOPYRROLATE 0.2 MG/ML
0.2 INJECTION INTRAMUSCULAR; INTRAVENOUS
Status: COMPLETED | OUTPATIENT
Start: 2021-08-11 | End: 2021-08-11

## 2021-08-11 RX ORDER — SODIUM CHLORIDE 9 MG/ML
INJECTION, SOLUTION INTRAVENOUS AS NEEDED
Status: DISCONTINUED | OUTPATIENT
Start: 2021-08-11 | End: 2021-08-11 | Stop reason: HOSPADM

## 2021-08-11 RX ORDER — FENTANYL CITRATE 50 UG/ML
100 INJECTION, SOLUTION INTRAMUSCULAR; INTRAVENOUS
Status: DISCONTINUED | OUTPATIENT
Start: 2021-08-11 | End: 2021-08-11 | Stop reason: HOSPADM

## 2021-08-11 RX ORDER — TRAMADOL HYDROCHLORIDE 50 MG/1
50 TABLET ORAL EVERY 6 HOURS PRN
Qty: 12 TABLET | Refills: 0 | Status: SHIPPED | OUTPATIENT
Start: 2021-08-11 | End: 2022-04-26

## 2021-08-11 RX ORDER — MIDAZOLAM HYDROCHLORIDE 1 MG/ML
1 INJECTION INTRAMUSCULAR; INTRAVENOUS
Status: DISCONTINUED | OUTPATIENT
Start: 2021-08-11 | End: 2021-08-11 | Stop reason: HOSPADM

## 2021-08-11 RX ORDER — BUPIVACAINE HYDROCHLORIDE AND EPINEPHRINE 2.5; 5 MG/ML; UG/ML
INJECTION, SOLUTION EPIDURAL; INFILTRATION; INTRACAUDAL; PERINEURAL AS NEEDED
Status: DISCONTINUED | OUTPATIENT
Start: 2021-08-11 | End: 2021-08-11 | Stop reason: HOSPADM

## 2021-08-11 RX ORDER — ONDANSETRON 2 MG/ML
INJECTION INTRAMUSCULAR; INTRAVENOUS AS NEEDED
Status: DISCONTINUED | OUTPATIENT
Start: 2021-08-11 | End: 2021-08-11 | Stop reason: SURG

## 2021-08-11 RX ORDER — TRAMADOL HYDROCHLORIDE 50 MG/1
50 TABLET ORAL ONCE AS NEEDED
Status: DISCONTINUED | OUTPATIENT
Start: 2021-08-11 | End: 2021-08-11 | Stop reason: HOSPADM

## 2021-08-11 RX ORDER — FENTANYL CITRATE 50 UG/ML
INJECTION, SOLUTION INTRAMUSCULAR; INTRAVENOUS AS NEEDED
Status: DISCONTINUED | OUTPATIENT
Start: 2021-08-11 | End: 2021-08-11 | Stop reason: SURG

## 2021-08-11 RX ORDER — ROCURONIUM BROMIDE 10 MG/ML
INJECTION, SOLUTION INTRAVENOUS AS NEEDED
Status: DISCONTINUED | OUTPATIENT
Start: 2021-08-11 | End: 2021-08-11 | Stop reason: SURG

## 2021-08-11 RX ORDER — SCOLOPAMINE TRANSDERMAL SYSTEM 1 MG/1
1 PATCH, EXTENDED RELEASE TRANSDERMAL ONCE
Status: DISCONTINUED | OUTPATIENT
Start: 2021-08-11 | End: 2021-08-11 | Stop reason: HOSPADM

## 2021-08-11 RX ORDER — LIDOCAINE HYDROCHLORIDE 20 MG/ML
INJECTION, SOLUTION INFILTRATION; PERINEURAL AS NEEDED
Status: DISCONTINUED | OUTPATIENT
Start: 2021-08-11 | End: 2021-08-11 | Stop reason: SURG

## 2021-08-11 RX ORDER — SODIUM CHLORIDE 0.9 % (FLUSH) 0.9 %
3-10 SYRINGE (ML) INJECTION AS NEEDED
Status: DISCONTINUED | OUTPATIENT
Start: 2021-08-11 | End: 2021-08-11 | Stop reason: HOSPADM

## 2021-08-11 RX ORDER — FENTANYL CITRATE 50 UG/ML
50 INJECTION, SOLUTION INTRAMUSCULAR; INTRAVENOUS
Status: DISCONTINUED | OUTPATIENT
Start: 2021-08-11 | End: 2021-08-11 | Stop reason: HOSPADM

## 2021-08-11 RX ORDER — NEOSTIGMINE METHYLSULFATE 0.5 MG/ML
INJECTION, SOLUTION INTRAVENOUS AS NEEDED
Status: DISCONTINUED | OUTPATIENT
Start: 2021-08-11 | End: 2021-08-11 | Stop reason: SURG

## 2021-08-11 RX ORDER — HYDROCODONE BITARTRATE AND ACETAMINOPHEN 5; 325 MG/1; MG/1
1 TABLET ORAL ONCE AS NEEDED
Status: DISCONTINUED | OUTPATIENT
Start: 2021-08-11 | End: 2021-08-11 | Stop reason: HOSPADM

## 2021-08-11 RX ORDER — EPHEDRINE SULFATE 50 MG/ML
5 INJECTION, SOLUTION INTRAVENOUS ONCE AS NEEDED
Status: DISCONTINUED | OUTPATIENT
Start: 2021-08-11 | End: 2021-08-11 | Stop reason: HOSPADM

## 2021-08-11 RX ORDER — MIDAZOLAM HYDROCHLORIDE 1 MG/ML
2 INJECTION INTRAMUSCULAR; INTRAVENOUS ONCE
Status: DISCONTINUED | OUTPATIENT
Start: 2021-08-11 | End: 2021-08-11 | Stop reason: HOSPADM

## 2021-08-11 RX ORDER — FLUMAZENIL 0.1 MG/ML
0.2 INJECTION INTRAVENOUS AS NEEDED
Status: DISCONTINUED | OUTPATIENT
Start: 2021-08-11 | End: 2021-08-11 | Stop reason: HOSPADM

## 2021-08-11 RX ORDER — MAGNESIUM HYDROXIDE 1200 MG/15ML
LIQUID ORAL AS NEEDED
Status: DISCONTINUED | OUTPATIENT
Start: 2021-08-11 | End: 2021-08-11 | Stop reason: HOSPADM

## 2021-08-11 RX ORDER — SODIUM CHLORIDE 0.9 % (FLUSH) 0.9 %
3 SYRINGE (ML) INJECTION EVERY 12 HOURS SCHEDULED
Status: DISCONTINUED | OUTPATIENT
Start: 2021-08-11 | End: 2021-08-11 | Stop reason: HOSPADM

## 2021-08-11 RX ORDER — HYDROMORPHONE HYDROCHLORIDE 1 MG/ML
0.5 INJECTION, SOLUTION INTRAMUSCULAR; INTRAVENOUS; SUBCUTANEOUS
Status: DISCONTINUED | OUTPATIENT
Start: 2021-08-11 | End: 2021-08-11 | Stop reason: HOSPADM

## 2021-08-11 RX ORDER — LIDOCAINE HYDROCHLORIDE 10 MG/ML
0.5 INJECTION, SOLUTION EPIDURAL; INFILTRATION; INTRACAUDAL; PERINEURAL ONCE AS NEEDED
Status: COMPLETED | OUTPATIENT
Start: 2021-08-11 | End: 2021-08-11

## 2021-08-11 RX ORDER — PROPOFOL 10 MG/ML
VIAL (ML) INTRAVENOUS AS NEEDED
Status: DISCONTINUED | OUTPATIENT
Start: 2021-08-11 | End: 2021-08-11 | Stop reason: SURG

## 2021-08-11 RX ORDER — FENTANYL CITRATE 50 UG/ML
50 INJECTION, SOLUTION INTRAMUSCULAR; INTRAVENOUS ONCE
Status: DISCONTINUED | OUTPATIENT
Start: 2021-08-11 | End: 2021-08-11 | Stop reason: HOSPADM

## 2021-08-11 RX ORDER — KETOROLAC TROMETHAMINE 30 MG/ML
INJECTION, SOLUTION INTRAMUSCULAR; INTRAVENOUS AS NEEDED
Status: DISCONTINUED | OUTPATIENT
Start: 2021-08-11 | End: 2021-08-11 | Stop reason: SURG

## 2021-08-11 RX ORDER — HYDROMORPHONE HCL 110MG/55ML
PATIENT CONTROLLED ANALGESIA SYRINGE INTRAVENOUS AS NEEDED
Status: DISCONTINUED | OUTPATIENT
Start: 2021-08-11 | End: 2021-08-11 | Stop reason: SURG

## 2021-08-11 RX ORDER — SODIUM CHLORIDE, SODIUM LACTATE, POTASSIUM CHLORIDE, CALCIUM CHLORIDE 600; 310; 30; 20 MG/100ML; MG/100ML; MG/100ML; MG/100ML
9 INJECTION, SOLUTION INTRAVENOUS CONTINUOUS
Status: DISCONTINUED | OUTPATIENT
Start: 2021-08-11 | End: 2021-08-11 | Stop reason: HOSPADM

## 2021-08-11 RX ORDER — ONDANSETRON 2 MG/ML
4 INJECTION INTRAMUSCULAR; INTRAVENOUS ONCE AS NEEDED
Status: DISCONTINUED | OUTPATIENT
Start: 2021-08-11 | End: 2021-08-11 | Stop reason: HOSPADM

## 2021-08-11 RX ADMIN — KETOROLAC TROMETHAMINE 30 MG: 30 INJECTION, SOLUTION INTRAMUSCULAR at 10:15

## 2021-08-11 RX ADMIN — FENTANYL CITRATE 25 MCG: 50 INJECTION INTRAMUSCULAR; INTRAVENOUS at 09:16

## 2021-08-11 RX ADMIN — NEOSTIGMINE METHYLSULFATE 2.5 MG: 0.5 INJECTION INTRAVENOUS at 10:15

## 2021-08-11 RX ADMIN — LIDOCAINE HYDROCHLORIDE 80 MG: 20 INJECTION, SOLUTION INFILTRATION; PERINEURAL at 08:56

## 2021-08-11 RX ADMIN — DEXAMETHASONE SODIUM PHOSPHATE 8 MG: 10 INJECTION INTRAMUSCULAR; INTRAVENOUS at 09:01

## 2021-08-11 RX ADMIN — HYDROMORPHONE HYDROCHLORIDE 0.25 MG: 2 INJECTION, SOLUTION INTRAMUSCULAR; INTRAVENOUS; SUBCUTANEOUS at 09:44

## 2021-08-11 RX ADMIN — GLYCOPYRROLATE 0.4 MG: 0.2 INJECTION INTRAMUSCULAR; INTRAVENOUS at 10:15

## 2021-08-11 RX ADMIN — FENTANYL CITRATE 50 MCG: 50 INJECTION INTRAMUSCULAR; INTRAVENOUS at 08:56

## 2021-08-11 RX ADMIN — SCOLOPAMINE TRANSDERMAL SYSTEM 1 PATCH: 1 PATCH, EXTENDED RELEASE TRANSDERMAL at 08:48

## 2021-08-11 RX ADMIN — FENTANYL CITRATE 50 MCG: 50 INJECTION INTRAMUSCULAR; INTRAVENOUS at 09:59

## 2021-08-11 RX ADMIN — FENTANYL CITRATE 25 MCG: 50 INJECTION INTRAMUSCULAR; INTRAVENOUS at 09:18

## 2021-08-11 RX ADMIN — GLYCOPYRROLATE 0.2 MG: 0.2 INJECTION INTRAMUSCULAR; INTRAVENOUS at 08:48

## 2021-08-11 RX ADMIN — ROCURONIUM BROMIDE 40 MG: 50 INJECTION INTRAVENOUS at 08:56

## 2021-08-11 RX ADMIN — SODIUM CHLORIDE, POTASSIUM CHLORIDE, SODIUM LACTATE AND CALCIUM CHLORIDE 9 ML/HR: 600; 310; 30; 20 INJECTION, SOLUTION INTRAVENOUS at 08:45

## 2021-08-11 RX ADMIN — LIDOCAINE HYDROCHLORIDE 0.5 ML: 10 INJECTION, SOLUTION EPIDURAL; INFILTRATION; INTRACAUDAL; PERINEURAL at 08:45

## 2021-08-11 RX ADMIN — PROPOFOL 200 MG: 10 INJECTION, EMULSION INTRAVENOUS at 08:56

## 2021-08-11 RX ADMIN — FENTANYL CITRATE 50 MCG: 50 INJECTION INTRAMUSCULAR; INTRAVENOUS at 10:08

## 2021-08-11 RX ADMIN — HYDROMORPHONE HYDROCHLORIDE 0.25 MG: 2 INJECTION, SOLUTION INTRAMUSCULAR; INTRAVENOUS; SUBCUTANEOUS at 09:31

## 2021-08-11 RX ADMIN — ONDANSETRON 4 MG: 2 INJECTION INTRAMUSCULAR; INTRAVENOUS at 10:15

## 2021-08-11 RX ADMIN — SODIUM CHLORIDE, POTASSIUM CHLORIDE, SODIUM LACTATE AND CALCIUM CHLORIDE: 600; 310; 30; 20 INJECTION, SOLUTION INTRAVENOUS at 10:09

## 2021-08-11 NOTE — H&P
H&P Note    Patient Identification:  Name: Екатерина Strauss  Age: 51 y.o.  Sex: female  :  1970  MRN: [id number]                       Chief Complaint:  Scheduled Surgery    History of Present Illness:   Екатерина Strauss is a 51 y.o.  female who presents for surgical management of recurrent ovarian cysts and surgical menopause in setting of history of left ER/[place] positive breast cancer s/p left mastectomy. The patient was taking tamoxifen but stopped after discussing with her oncologist because she had recurrent ovarian cysts that required hospitalization at [facility] in April for pain management after a ruptured left ovarian cyst and hemoperitoneum. She had followed up in clinic to evaluate the left ovarian cyst which was notably resolved but she had a simple right ovarian cyst measuring 2.8 x 3.4 cm on her right ovary at the visit on 6/3/21. Given that the patient has now had recurrent ovarian cysts while on tamoxifen, the patient wished to proceed with ovarian removal for surgical menopause given hormonally positive breast cancer in the past and prevent further cyst formation. She has no recent changes in her medical or surgical history since her last visit on 21.     She has history of laparoscopic hysterectomy in  and diagnostic laparoscopy.     Past Medical History:  Past Medical History:   Diagnosis Date   • Breast cancer (CMS/HCC) 2019    LEFT BREAST   • Fibrocystic breast    • History of palpitations    • Ovarian cyst rupture 2021   • PONV (postoperative nausea and vomiting)     GAVE TOO MUCH AFTER HYSTERECTOMY   • Seasonal allergies      Past Surgical History:  Past Surgical History:   Procedure Laterality Date   • BILATERAL BREAST REDUCTION Right 2019    Procedure: RIGHT REDUCTION FOR SYMMETRY;  Surgeon: PATRICIO Levine MD;  Location: [facility];  Service: Plastics   • BREAST BIOPSY     • BREAST RECONSTRUCTION Left 8/15/2019    Procedure: LEFT PLACEMENT TISSUE EXPANDER  WITH ALLORDERM;  Surgeon: PATRICIO Levine MD;  Location: [facility] MAIN OR;  Service: Plastics   • BREAST TISSUE EXPANDER REMOVAL INSERTION OF IMPLANT Bilateral 12/19/2019    Procedure: REMOVAL LEFT TISSUE EXPANDER AND PLACEMENT OF IMPLANT;  Surgeon: PATRICIO Levine MD;  Location: [facility] MAIN OR;  Service: Plastics   • BUNIONECTOMY Bilateral     BUNIONECTOMY-2005 AND 2015   • COLONOSCOPY N/A 3/15/2021    Procedure: COLONOSCOPY TO CECUM AND TI;  Surgeon: Heather Montero MD;  Location: [facility] ENDOSCOPY;  Service: Gastroenterology;  Laterality: N/A;  SCREENING  POST- HEMORRHOIDS   • DILATATION AND CURETTAGE  1998   • DILATATION AND CURETTAGE  1991    Vaginal warts   • HYSTERECTOMY  08/2008   • MASTECTOMY W/ SENTINEL NODE BIOPSY Left 8/15/2019    Procedure: LEFt total mastectomy, LEFT axilla sentinel node biopsy,  immediate recosntruction;  Surgeon: Sarah Barker MD;  Location: [facility] MAIN OR;  Service: General   • VAGINAL HYSTERECTOMY     • WISDOM TOOTH EXTRACTION        Home Meds:  Medications Prior to Admission   Medication Sig Dispense Refill Last Dose   • loratadine-pseudoephedrine ([employer] Allergy Relief-D)  MG per 24 hr tablet Take 1 tablet by mouth Daily. 30 tablet 2 8/10/2021   • Multiple Vitamin (MULTIVITAMIN) tablet Take 1 tablet by mouth Daily.   8/4/2021   • Nutritional Supplements (VITAMIN D MAINTENANCE PO) Take 1 tablet by mouth Daily.   8/10/2021   • tamoxifen (NOLVADEX) 20 MG chemo tablet Take 20 mg by mouth Daily.   6/3/2021     Current Meds:   Current Facility-Administered Medications   Medication Dose Route Frequency Provider Last Rate Last Admin   • fentaNYL citrate (PF) (SUBLIMAZE) injection 100 mcg  100 mcg Intravenous Q15 Min PRN Clemente Leon MD       • fentaNYL citrate (PF) (SUBLIMAZE) injection 50 mcg  50 mcg Intravenous Once Clemente Leon MD       • lactated ringers infusion  9 mL/hr Intravenous Continuous Clemente Leon MD 9 mL/hr at 08/11/21 0845 9 mL/hr at 08/11/21 0845    • midazolam (VERSED) injection 1 mg  1 mg Intravenous Q10 Min PRN Clemente Leon MD       • midazolam (VERSED) injection 2 mg  2 mg Intravenous Once Clemente Leon MD       • scopolamine patch 1 mg/72 hr  1 patch Transdermal Once Clemente Leon MD   1 patch at 21 0848   • sodium chloride 0.9 % flush 3 mL  3 mL Intravenous Q12H Clemente Leon MD       • sodium chloride 0.9 % flush 3-10 mL  3-10 mL Intravenous PRN Clemente Leon MD           Allergies:  Allergies   Allergen Reactions   • Codeine Rash   • Percocet [Oxycodone-Acetaminophen] Itching     Immunizations:  Immunization History   Administered Date(s) Administered   • COVID-19 (PFIZER) 2021, 2021   • Tdap 2010     Social History:   Social History     Tobacco Use   • Smoking status: Never Smoker   • Smokeless tobacco: Never Used   Substance Use Topics   • Alcohol use: Yes     Comment: 3 PER MONTH-SOCIALLY      Family History:  Family History   Problem Relation Age of Onset   • Diabetes Mother    • Heart disease Mother    • Hypertension Mother    • Hyperlipidemia Mother    • Cancer Maternal Aunt         OF UNKNOWN ORGIN   • Diabetes Maternal Aunt    • Hyperlipidemia Maternal Aunt    • Ovarian cancer Paternal Aunt    • Lung cancer Maternal Grandmother    • Brain cancer Maternal Grandmother    • Hypertension Father    • Heart attack Father    • Seizures Brother    • Malig Hyperthermia Neg Hx         Review of Systems  Pertinent items are noted in HPI.    Objective:  tMax 24 hrs: Temp (24hrs), Av.9 °F (37.2 °C), Min:98.9 °F (37.2 °C), Max:98.9 °F (37.2 °C)    Vitals Ranges:   Temp:  [98.9 °F (37.2 °C)] 98.9 °F (37.2 °C)  Heart Rate:  [94] 94  Resp:  [16] 16  BP: (109)/(82) 109/82  Intake and Output Last 3 Shifts:   No intake/output data recorded.    Exam:     General Appearance:    Alert, cooperative, no distress, appears stated age   Head:    Normocephalic, without obvious abnormality, atraumatic   Back:     Symmetric, no  curvature, ROM normal   Lungs:     No increased work of breathing, regular respirations     Heart:    Regular rate and rhythm   Abdomen:     Soft, non-tender, non-tender,     no masses, no organomegaly, scars at her umbilicus present   Extremities:   Extremities normal, atraumatic, no cyanosis or edema   Skin:   Skin color, texture, turgor normal, no rashes or lesions       Data Review:  CBC:   Results from last 7 days   Lab Units 08/09/21  1458   WBC 10*3/mm3 7.67   RBC 10*6/mm3 4.73     COVID-19: not detected 8/9/21        Assessment:    History of breast cancer    Bilateral ovarian cysts    Plan:  - Reviewed procedure including diagnostic laparoscopy, bilateral oophorectomy, possible bilateral salpingectomy if uterine tubes present. I discussed the risks and consents signed.   - Antibiotics: not indicated  - DVT PPx: SCDs  - Will proceed to the OR for planned procedure.  - Reviewed discharge instructions and all questions answered.     Gertrude Humphries MD  8/11/2021

## 2021-08-11 NOTE — ANESTHESIA PREPROCEDURE EVALUATION
Anesthesia Evaluation     Patient summary reviewed and Nursing notes reviewed   history of anesthetic complications: PONV  NPO Solid Status: > 8 hours             Airway   Mallampati: I  TM distance: >3 FB  Neck ROM: full  No difficulty expected  Dental - normal exam     Pulmonary - negative pulmonary ROS and normal exam   Cardiovascular - negative cardio ROS and normal exam        Neuro/Psych- negative ROS  GI/Hepatic/Renal/Endo - negative ROS     Musculoskeletal (-) negative ROS    Abdominal  - normal exam    Bowel sounds: normal.   Substance History - negative use     OB/GYN negative ob/gyn ROS         Other      history of cancer                      Anesthesia Plan    ASA 2     general     intravenous induction     Anesthetic plan, all risks, benefits, and alternatives have been provided, discussed and informed consent has been obtained with: patient.

## 2021-08-11 NOTE — ANESTHESIA PROCEDURE NOTES
Airway  Urgency: elective    Date/Time: 8/11/2021 8:59 AM  Airway not difficult    General Information and Staff    Patient location during procedure: OR  Anesthesiologist: Corbin Dozier MD  CRNA: Kavitha Martino CRNA    Indications and Patient Condition  Indications for airway management: airway protection    Preoxygenated: yes  Mask difficulty assessment: 2 - vent by mask + OA or adjuvant +/- NMBA    Final Airway Details  Final airway type: endotracheal airway      Successful airway: ETT  Cuffed: yes   Successful intubation technique: direct laryngoscopy  Facilitating devices/methods: intubating stylet  Endotracheal tube insertion site: oral  Blade: Genny  Blade size: 3  ETT size (mm): 7.0  Cormack-Lehane Classification: grade I - full view of glottis  Placement verified by: chest auscultation and capnometry   Measured from: lips  ETT/EBT  to lips (cm): 20  Number of attempts at approach: 1  Assessment: lips, teeth, and gum same as pre-op and atraumatic intubation

## 2021-08-11 NOTE — OP NOTE
OPERATIVE REPORT    Екатерина Strauss  [id number]  07/19/2021    Date of Service: 8/11/2021    Preoperative Diagnoses: Recurrent ovarian cysts, history of ER/AL positive breast cancer     Postoperative Diagnoses: Same, fibroid of right pelvic side wall     Procedures: Laparoscopic bilateral salpingo-oophorectomy, excision of fibroid from pelvic side wall     Surgeon: Gertrude Humphries MD     Anesthesia: General endotracheal    Estimated Blood Loss: 25  ml    Findings: No adhesive disease, uterus surgically absent, normal appearing ovaries with remnants of hemorrhagic cyst on left ovary, normal appear uterine tubes, 2 cm fibroid along the right pelvis side wall contained within the peritoneum.     Specimens: Bilateral fallopian tubes and ovaries    Indications for Procedure: Екатерина Strauss is a 51 y.o. woman who presents for surgical management of recurrent ovarian cysts in setting of history of left ER/AL positive breast cancer. She has been experiencing recurrent ovarian cysts over the last few months requiring hospitalization after rupture while taking tamoxifen which she has now stopped. She desires ovarian removal to prevent further ovarian cysts and decrease risk of cancer. Prior to the procedure, all risks, benefits, and alternatives were discussed and informed surgical consent was signed.    Procedure:  Patient was taken to the operating room where general anesthesia was achieved.  She was positioned in dorsal lithotomy and prepped and draped.  Her bladder was drained with straight catheter.  A sponge stick was placed in her vagina. A 10 mm infraumbilical incision was made and carried down to the fascia which was grasped with Kocher clamps. The fascia was incised and tagged with 0 vicryl. The abdomen was then entered sharply with scissors after tenting posterior rectus sheath and peritoneum. The 10 mm Claudy trocar was placed and  opening pressure was noted to be 4mmHg. The entry site was noted to have no  trauma. The abdomen was insufflated and additional trocars were placed as follows: a 5 mm trocar in left lower quadrant and a 5 mm trocar in the right lower quadrant. All trocars were placed under direct visualization.  The patient was placed in steep Trendelenberg and the bowels were moved into the upper abdomen.     Attention was turned to the left pelvis and the left ovary and tube identified. The left ureter was identified.  The left ovary and tube were grasped and pulled away from the pelvis side wall. The left infundibulopelvic ligament was then isolated, cauterized, and transected with the Ligasure and the specimen was placed in the posterior cul de sac. The same procedure was performed on the contralateral side.     Attention was then turned to the fibroid mass along the right pelvic side wall which was dorsal to the ureter. The fibroid was grasped and pulled away from the pelvic side wall. The peritoneal and blood supply was cut with laparoscopic scissors and the specimen placed in the posterior cul de sac. The peritoneum was bleeding and laparoscopic scissors were used to cauterize the area, ensuring that the right ureter was away from the peritoneal site. Hemostasis was then noted following irritation of the area.     All specimens were then placed into an Endo-Catch bag and removed through the 10 mm trocar.The pelvis was irrigated and all operative sites were found to be hemostatic.  All instruments were removed and the abdomen was desufflated.  All ports were removed.  The fascia at the 10 mm incisions was closed with 0 Vicryl in a running fashion and then stay sutures tied together. The skin at all incisions was closed with 4-0 Vicryl in a subcuticular fashion and Exofin placed over incisions.     Patient tolerated the procedure well.  Sponge, lap, and instrument counts were correct.  No perioperative antibiotic prophylaxis was indicated for this procedure.  She was extubated and taken to the PACU in  stable condition. Pictures were taken throughout.     Gertrude Humphries MD

## 2021-08-11 NOTE — ANESTHESIA POSTPROCEDURE EVALUATION
Patient: Екатерина Strauss    Procedure Summary     Date: 08/11/21 Room / Location:  [facility]  /  [facility]    Anesthesia Start: 0852 Anesthesia Stop: 1046    Procedure: DIAGNOSTIC LAPAROSCOPY, SALPINGO OOPHORECTOMY LAPAROSCOPIC (Bilateral Abdomen) Diagnosis:       History of breast cancer      Bilateral ovarian cysts      (History of breast cancer [Z85.3])      (Bilateral ovarian cysts [N83.201, N83.202])    Surgeons: Gertrude Humphries MD Provider: Corbin Dozier MD    Anesthesia Type: general ASA Status: 2          Anesthesia Type: general    Vitals  Vitals Value Taken Time   /73 08/11/21 1116   Temp 36.6 °C (97.8 °F) 08/11/21 1043   Pulse 86 08/11/21 1131   Resp 18 08/11/21 1115   SpO2 97 % 08/11/21 1132   Vitals shown include unvalidated device data.        Post Anesthesia Care and Evaluation    Patient location during evaluation: bedside  Patient participation: complete - patient participated  Level of consciousness: awake  Pain management: adequate  Airway patency: patent  Anesthetic complications: No anesthetic complications  PONV Status: controlled  Cardiovascular status: acceptable  Respiratory status: acceptable  Hydration status: acceptable    Comments: --------------------            08/11/21               1135     --------------------   BP:       120/78     Pulse:      84       Resp:       18       Temp:                SpO2:      97%      --------------------

## 2021-08-11 NOTE — PROGRESS NOTES
Subjective     REASON FOR CONSULTATION:   1.Left breast cancer pT1cN 0- ER KS positive HER-2 negative low-grade infiltrating ductal carcinoma Oncotype score of 6 postmastectomy and sentinel node biopsy on 8/20/19  2.  Oncotype score of 6 tamoxifen prescribed                               REQUESTING PHYSICIAN:  MD Dannielle Camap MD    History of Present Illness patient is a 49-year-old premenopausal woman with a small hormone positive breast cancer which is low-grade with a low Oncotype score.  Of 6    She has been on tamoxifen now for 24  months and was tolerating it well but then was hospitalized at [facility] in April with a ruptured cyst in her left ovary the cause significant pain and then has developed a fairly significant cyst on her right ovary.  Her gynecologist is given her 2 options-either have her ovaries removed or switch to another medication and with her situation with a very high estradiol level earlier this year I think it would be easier to remove her ovaries and then switch to a aromatase inhibitor  than to put her on Lupron indefinitely and she is okay with this    She had her bilateral salpingo-oophorectomy 2 days ago and is doing fine and we reviewed the path reports that showed no malignancy or atypia.  Fibroid was removed and I am not sure how this showed up unless the fibroid was on the ovary because she has had a hysterectomy in the past    mammogram on the right was done in June and benign     she never had a colonoscopy and she finally had a done in March and it was benign    We discussed side effect profile of Arimidex and I have prescribed it for her.The side effects and toxicities of the Aromatase inhibitors was discussed with the patient including, hot flashes, mood swings and hair thinning.Significant arthralgias and worsening bone density were also discussed. Baseline bone density evaluation was ordered.  If  she has trouble tolerating it we will switch back to tamoxifen  .        Past Medical History:   Diagnosis Date   • Breast cancer (CMS/HCC) 05/2019    LEFT BREAST   • Fibrocystic breast    • History of palpitations    • Ovarian cyst rupture 04/2021   • PONV (postoperative nausea and vomiting)     GAVE TOO MUCH AFTER HYSTERECTOMY   • Seasonal allergies         Past Surgical History:   Procedure Laterality Date   • BILATERAL BREAST REDUCTION Right 12/19/2019    Procedure: RIGHT REDUCTION FOR SYMMETRY;  Surgeon: PATRICIO Levine MD;  Location: [facility] MAIN OR;  Service: Plastics   • BREAST BIOPSY     • BREAST RECONSTRUCTION Left 8/15/2019    Procedure: LEFT PLACEMENT TISSUE EXPANDER WITH ALLORDERM;  Surgeon: PATRICIO Levine MD;  Location: [facility] OR;  Service: Plastics   • BREAST TISSUE EXPANDER REMOVAL INSERTION OF IMPLANT Bilateral 12/19/2019    Procedure: REMOVAL LEFT TISSUE EXPANDER AND PLACEMENT OF IMPLANT;  Surgeon: PATRICIO Levine MD;  Location: [facility] OR;  Service: Plastics   • BUNIONECTOMY Bilateral     BUNIONECTOMY-2005 AND 2015   • COLONOSCOPY N/A 3/15/2021    Procedure: COLONOSCOPY TO CECUM AND TI;  Surgeon: Heather Montero MD;  Location: [facility] ENDOSCOPY;  Service: Gastroenterology;  Laterality: N/A;  SCREENING  POST- HEMORRHOIDS   • DIAGNOSTIC LAPAROSCOPY Bilateral 8/11/2021    Procedure: DIAGNOSTIC LAPAROSCOPY, SALPINGO OOPHORECTOMY LAPAROSCOPIC;  Surgeon: Gertrude Humphries MD;  Location: [facility] OR [facility];  Service: Obstetrics/Gynecology;  Laterality: Bilateral;   • DILATATION AND CURETTAGE  1998   • DILATATION AND CURETTAGE  1991    Vaginal warts   • HYSTERECTOMY  08/2008   • MASTECTOMY W/ SENTINEL NODE BIOPSY Left 8/15/2019    Procedure: LEFt total mastectomy, LEFT axilla sentinel node biopsy,  immediate recosntruction;  Surgeon: Sarah Barker MD;  Location: [facility] OR;  Service: General   • VAGINAL HYSTERECTOMY     • WISDOM TOOTH EXTRACTION      ONC HISTTORY  patient is a 49-year-old  -American female in excellent health on no chronic medications who was noted on her routine mammogram after 3-year break to have an abnormality in the left breast.  The patient thought she had some tenderness and showed this to her family doctor who sent her for a mammogram and this did show a definite difference from 3 years previously in that a12 mm abnormality was noted in her left breast at the 12:30 position.  Diagnostic mammogram showed in addition  that a to the lesion at 1230 there was another abnormality measuring 7 mm at the 1:00 region and several groups of indistinct calcifications were also noted in this area.  The ultrasound confirmed a solid mass measuring 11 x 10 at 1230 and a 4 x 4 millimeter mass at 4:00 in addition to 2 other cysts biopsy was done.on 2019 with the lesion at 12:00 showing atypical ductal hyperplasia measuring 1 mm negative for invasive cancer in the lesion at 1230 showed invasive mammary carcinoma no special type low-grade measuring 9 mm associated with low-grade DCIS and microcalcifications tumor was 90% ER +94% OR positive HER-2 negative low Ki-67 of 5.7%  Patient underwent bilateral breast MRI on 2019 and this showed the biopsy-proven mass at the 12 o'clock position measuring 1.5 cm residual mass in the 3 o'clock position marker from the ADH showed no suspicious residual enhancement.  There is no obvious adenopathy in the right breast was benign    Because of the multifocal nature of the disease the patient opted for a left mastectomy with immediate reconstruction and the final pathology specimen showed a 12 x 12 mm low-grade 1 invasive ductal carcinoma with 2-negative sentinel nodes.  There was associated DCIS measuring 8 x 6 mm and margins were all clear    Patient is  4 para 2 1 miscarriage and one  first childbirth was at age 23 she did not breast-feed she was on birth control for 1 year and has not had appeared since  when she had a  hysterectomy for adenomyosis.  She is probably premenopausal as she has had no symptoms of menopause    Family history is completely negative for breast cancer ovarian cancer or any other malignancy that she is aware of father  at 60 of a heart attack mother 73 she has one brother who is healthy.    She is here today to discuss adjuvant treatment her Oncotype score came back at 6 Which I told her was excellent Which predicts no benefit from chemotherapy and distant recurrence at 9 years of 3% with hormonal blockade  We discussed tamoxifen as she is likely premenopausal and the side effect profile  The side effects and toxicities of Tamoxifen were discussed with the patient, including hot flashes, mood swings,depression, DVT and endometrial cancer. A list of drugs that interfere with the efficacy of tamoxifen and are to be avoided were given to the patient.  Estradiol FSH and LH levels have been drawn today to ensure that she is premenopausal to confirm she is premenopausal  Discussed in detail the side effects of tamoxifen and the information from the Oncotype DX score and she is very happy to get away without chemotherapy and agreeable to tamoxifen but she will call me next week to check her menopausal status before taking it.  No radiation is planned       She has been on tamoxifen for about 3 months now and after the first month developed dizziness and somnolence and we asked her to cut back to 10 mg for a couple of weeks but she misunderstood and has stayed on 10 mg and is tolerating this fairly well overall.    When she was seen by Dr. Gooden and 2019 she increased the tamoxifen back up to 20 mg and has been tolerating that well since that time.  She has noticed some increased tearfulness.  She does have upcoming nipple reconstruction and follows up with Dr. Levine tomorrow in preparation for this.  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        Current Outpatient Medications on File Prior to Visit   Medication Sig Dispense Refill   • ibuprofen (ADVIL,MOTRIN) 800 MG tablet Take 1 tablet by mouth Every 6 (Six) Hours As Needed for Mild Pain . 30 tablet 1   • loratadine-pseudoephedrine ([facility] Allergy Relief-D)  MG per 24 hr tablet Take 1 tablet by mouth Daily. 30 tablet 2   • Multiple Vitamin (MULTIVITAMIN) tablet Take 1 tablet by mouth Daily.     • Nutritional Supplements (VITAMIN D MAINTENANCE PO) Take 1 tablet by mouth Daily.     • tamoxifen (NOLVADEX) 10 MG tablet 10 mg.     • traMADol (ULTRAM) 50 MG tablet Take 1 tablet by mouth Every 6 (Six) Hours As Needed for Moderate Pain . 12 tablet 0     No current facility-administered medications on file prior to visit.        ALLERGIES:    Allergies   Allergen Reactions   • Codeine Rash   • Percocet [Oxycodone-Acetaminophen] Itching        Social History     Socioeconomic History   • Marital status:      Spouse name: Not on file   • Number of children: Not on file   • Years of education: College   • Highest education level: Not on file   Tobacco Use   • Smoking status: Never Smoker   • Smokeless tobacco: Never Used   Substance and Sexual Activity   • Alcohol use: Yes     Comment: 3 PER MONTH-SOCIALLY   • Drug use: No   • Sexual activity: Yes     Birth control/protection: Surgical        Family History   Problem Relation Age of Onset   • Diabetes Mother    • Heart disease Mother    • Hypertension Mother    • Hyperlipidemia Mother    • Cancer Maternal Aunt         OF UNKNOWN ORGIN   • Diabetes Maternal Aunt    • Hyperlipidemia Maternal Aunt    • Ovarian cancer Paternal Aunt    • Lung cancer Maternal Grandmother    • Brain cancer Maternal Grandmother    • Hypertension Father    • Heart attack Father    • Seizures Brother    • Malig Hyperthermia Neg Hx         Review of Systems  "  Constitutional: Negative.  Negative for appetite change, chills, diaphoresis, fatigue, fever and unexpected weight change.   HENT: Negative for hearing loss, sore throat and trouble swallowing.    Respiratory: Negative for cough, chest tightness, shortness of breath and wheezing.    Cardiovascular: Negative for chest pain (nerve like pain under left axilla intermittently), palpitations and leg swelling.   Gastrointestinal: Negative for abdominal distention, abdominal pain, diarrhea, nausea and vomiting.   Genitourinary: Negative for dysuria, frequency, hematuria and urgency.   Musculoskeletal: Negative for joint swelling.        No muscle weakness.   Skin: Negative for rash and wound.   Neurological: Negative for seizures, syncope, speech difficulty, weakness, numbness and headaches.   Hematological: Negative for adenopathy. Does not bruise/bleed easily.   Psychiatric/Behavioral: Negative for behavioral problems, confusion and suicidal ideas. The patient is not nervous/anxious.    All other systems reviewed and are negative.   I have reviewed and confirmed the accuracy of the ROS as documented by the MA/LPN/RN Tanner Gooden MD        Objective     Vitals:    08/13/21 0834   BP: 125/79   Pulse: 89   Resp: 16   Temp: 97.5 °F (36.4 °C)   TempSrc: Temporal   SpO2: 100%   Weight: 83 kg (183 lb)   Height: 175.3 cm (69.02\")   PainSc: 4  Comment:  ovary removal on wed     Current Status 1/28/2021   ECOG score 0       Physical Exam   GENERAL:  Well-developed, well-nourished in no acute distress.   SKIN:  Warm, dry without rashes, purpura or petechiae.  EYES:  Pupils equal, round and reactive to light.  EOMs intact.  Conjunctivae normal.  EARS:  Hearing intact.  NOSE:  Septum midline.  No excoriations or nasal discharge.  MOUTH:  Tongue is well-papillated; no stomatitis or ulcers.  Lips normal.  THROAT:  Oropharynx without lesions or exudates.  NECK:  Supple with good range of motion; no thyromegaly or masses, no " JVD.  LYMPHATICS:  No cervical, supraclavicular, axillary adenopathy.  CHEST:  Lungs clear to auscultation. Good airflow.  BREASTS: Right breast is benign left implant in place with incisions healed.  CARDIAC:  Regular rate and rhythm without murmurs, rubs or gallops. Normal S1,S2.  ABDOMEN:  Soft,  tender due to surgery 2 days ago with laparoscopic wounds well-healed with no hepatosplenomegaly or masses.  EXTREMITIES:  No clubbing, cyanosis or edema.  NEUROLOGICAL:  Cranial Nerves II-XII grossly intact.  No focal neurological deficits.  PSYCHIATRIC:  Normal affect and mood.      I have reexamined the patient and the results are consistent with the previously documented exam. Tanner Gooden MD       RECENT LABS:  Hematology WBC   Date Value Ref Range Status   08/13/2021 9.10 3.40 - 10.80 10*3/mm3 Final     RBC   Date Value Ref Range Status   08/13/2021 4.51 3.77 - 5.28 10*6/mm3 Final     Hemoglobin   Date Value Ref Range Status   08/13/2021 13.0 12.0 - 15.9 g/dL Final     Hematocrit   Date Value Ref Range Status   08/13/2021 39.7 34.0 - 46.6 % Final     Platelets   Date Value Ref Range Status   08/13/2021 230 140 - 450 10*3/mm3 Final            Pathology          Study Result     BILATERAL BREAST MRI WITHOUT AND WITH CONTRAST       IMPRESSION:  1. Biopsy-proven malignancy in the left breast at the 12 o'clock  position with the more posterior of the barrel-shaped metallic clip seen  centrally within the residual mass which measures on the order of 1.5 cm  in greatest dimension. The more anterior barrel-shaped metallic clips is  located within the hematoma. No other suspicious findings are seen  within the left breast and there is no evidence for left axillary  adenopathy.  2. The top hat shaped metallic clip at the 3 o'clock position  corresponds to the site of ADH and is not associated with any residual  suspicious enhancement. The metallic clip is seen within a dominant  hematoma cavity that measures on the  order of 4 cm in greatest  dimension.  3. There are no findings suspicious for malignancy in the right breast.     BI-RADS Category 6: Known malignancy.     This report was finalized on 7/8/2019 11:51 AM by Dr. Harjinder Encinas M.D.               Tissue Pathology Exam: [id number]     Final Diagnosis   1. Right Breast, Reduction Mammoplasty (78.5 grams):               A. Benign skin and breast tissue.               B. No atypical hyperplasia, in situ nor invasive carcinoma identified.     swm/pkm    Electronically signed by Sujey Baig MD on 12/20/2019 at 1504     Final Diagnosis   1. Left and Right Fallopian Tubes and Ovaries, Bilateral Salpingo-oophorectomy:           Benign ovaries and fallopian tubes with                A. Benign ovaries with                            1. Hemorrhagic cysts.                            2. Cortical inclusion cysts.  3. Adhesion to the fallopian tube by one of the ovaries.  4. Corporal albicantia.  5. Hemorrhagic corpus luteal cyst.   B. Benign fallopian tubes with complete cross sections of both fallopian tubes with  1. Paratubal cysts in one fallopian tube.  2. An area of endosalpingiosis in one of the fallopian tubes.      2. Fibroid:               A. Leiomyoma.          Assessment/Plan   1.  T1cN0 low-grade ER HI positive HER-2 negative infiltrating ductal carcinoma left breast with associated DCIS multifocal post mastectomy and sentinel node biopsy-Oncotype score of 6  · Tamoxifen prescribed in 9/19-patient cut to 10 mg after 1 month and stayed on this dose till 12/19 when escalated to 20 mg  · Patient continues the tamoxifen 20 mg daily.  · Post bilateral salpingo-oophorectomy in 8/21 switch to Arimidex    2.   negative 9 gene invitae panel testing    3.  Increased tearfulness.  Improved    4.?  Blood in stool needs colonoscopy    Plan  1.  Arimidex 1 mg daily   2.  Follow-up with me  in 6 months.        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

## 2021-08-11 NOTE — DISCHARGE INSTRUCTIONS
Discharge instructions reviewed include:  - contact MD Office with increasing pain, fever (temp of 100.4 or greater), nausea/vomiting, heavy vaginal bleeding, or other concerning symptom  - if you are having a medical emergency, go to the Emergency Department  - vaginal bleeding/spotting is normal, but should get lighter over the next 2-3 days  - mild to moderate cramping is expected.    - shoulder pain may occur; this is typical of laparoscopic surgery.  - do not take more than one type of NSAID (such as mobic, ibuprofen, naproxen).  You may alternate an NSAID and Tylenol (acetaminophen) but do not take more than 4000mg (4g) of Tylenol (acetaminophen) in 24 hours.  If you choose to not take narcotic pain medication, I recommend alternating ibuprofen 600mg every 6 hours and tylenol (acetaminophen) 500mg every 6 hours.  - do not lift anything over 10 lbs for 2 weeks

## 2021-08-13 ENCOUNTER — LAB (OUTPATIENT)
Dept: LAB | Facility: HOSPITAL | Age: 51
End: 2021-08-13

## 2021-08-13 ENCOUNTER — OFFICE VISIT (OUTPATIENT)
Dept: ONCOLOGY | Facility: CLINIC | Age: 51
End: 2021-08-13

## 2021-08-13 VITALS
RESPIRATION RATE: 16 BRPM | SYSTOLIC BLOOD PRESSURE: 125 MMHG | DIASTOLIC BLOOD PRESSURE: 79 MMHG | BODY MASS INDEX: 27.11 KG/M2 | OXYGEN SATURATION: 100 % | TEMPERATURE: 97.5 F | HEIGHT: 69 IN | WEIGHT: 183 LBS | HEART RATE: 89 BPM

## 2021-08-13 DIAGNOSIS — Z17.0 MALIGNANT NEOPLASM OF UPPER-OUTER QUADRANT OF LEFT BREAST IN FEMALE, ESTROGEN RECEPTOR POSITIVE (HCC): Primary | ICD-10-CM

## 2021-08-13 DIAGNOSIS — C50.412 MALIGNANT NEOPLASM OF UPPER-OUTER QUADRANT OF LEFT BREAST IN FEMALE, ESTROGEN RECEPTOR POSITIVE (HCC): Primary | ICD-10-CM

## 2021-08-13 LAB
ALBUMIN SERPL-MCNC: 3.7 G/DL (ref 3.5–5.2)
ALBUMIN/GLOB SERPL: 1.2 G/DL (ref 1.1–2.4)
ALP SERPL-CCNC: 65 U/L (ref 38–116)
ALT SERPL W P-5'-P-CCNC: 10 U/L (ref 0–33)
ANION GAP SERPL CALCULATED.3IONS-SCNC: 8.9 MMOL/L (ref 5–15)
AST SERPL-CCNC: 14 U/L (ref 0–32)
BASOPHILS # BLD AUTO: 0.05 10*3/MM3 (ref 0–0.2)
BASOPHILS NFR BLD AUTO: 0.5 % (ref 0–1.5)
BILIRUB SERPL-MCNC: 0.4 MG/DL (ref 0.2–1.2)
BUN SERPL-MCNC: 8 MG/DL (ref 6–20)
BUN/CREAT SERPL: 10 (ref 7.3–30)
CALCIUM SPEC-SCNC: 8.9 MG/DL (ref 8.5–10.2)
CHLORIDE SERPL-SCNC: 106 MMOL/L (ref 98–107)
CO2 SERPL-SCNC: 25.1 MMOL/L (ref 22–29)
CREAT SERPL-MCNC: 0.8 MG/DL (ref 0.6–1.1)
DEPRECATED RDW RBC AUTO: 43.9 FL (ref 37–54)
EOSINOPHIL # BLD AUTO: 0.03 10*3/MM3 (ref 0–0.4)
EOSINOPHIL NFR BLD AUTO: 0.3 % (ref 0.3–6.2)
ERYTHROCYTE [DISTWIDTH] IN BLOOD BY AUTOMATED COUNT: 13.6 % (ref 12.3–15.4)
GFR SERPL CREATININE-BSD FRML MDRD: 92 ML/MIN/1.73
GLOBULIN UR ELPH-MCNC: 3 GM/DL (ref 1.8–3.5)
GLUCOSE SERPL-MCNC: 94 MG/DL (ref 74–124)
HCT VFR BLD AUTO: 39.7 % (ref 34–46.6)
HGB BLD-MCNC: 13 G/DL (ref 12–15.9)
IMM GRANULOCYTES # BLD AUTO: 0.03 10*3/MM3 (ref 0–0.05)
IMM GRANULOCYTES NFR BLD AUTO: 0.3 % (ref 0–0.5)
LYMPHOCYTES # BLD AUTO: 4.4 10*3/MM3 (ref 0.7–3.1)
LYMPHOCYTES NFR BLD AUTO: 48.4 % (ref 19.6–45.3)
MCH RBC QN AUTO: 28.8 PG (ref 26.6–33)
MCHC RBC AUTO-ENTMCNC: 32.7 G/DL (ref 31.5–35.7)
MCV RBC AUTO: 88 FL (ref 79–97)
MONOCYTES # BLD AUTO: 0.6 10*3/MM3 (ref 0.1–0.9)
MONOCYTES NFR BLD AUTO: 6.6 % (ref 5–12)
NEUTROPHILS NFR BLD AUTO: 3.99 10*3/MM3 (ref 1.7–7)
NEUTROPHILS NFR BLD AUTO: 43.9 % (ref 42.7–76)
NRBC BLD AUTO-RTO: 0 /100 WBC (ref 0–0.2)
PLATELET # BLD AUTO: 230 10*3/MM3 (ref 140–450)
PMV BLD AUTO: 10.5 FL (ref 6–12)
POTASSIUM SERPL-SCNC: 4 MMOL/L (ref 3.5–4.7)
PROT SERPL-MCNC: 6.7 G/DL (ref 6.3–8)
RBC # BLD AUTO: 4.51 10*6/MM3 (ref 3.77–5.28)
SODIUM SERPL-SCNC: 140 MMOL/L (ref 134–145)
WBC # BLD AUTO: 9.1 10*3/MM3 (ref 3.4–10.8)

## 2021-08-13 PROCEDURE — 36415 COLL VENOUS BLD VENIPUNCTURE: CPT

## 2021-08-13 PROCEDURE — 99214 OFFICE O/P EST MOD 30 MIN: CPT | Performed by: INTERNAL MEDICINE

## 2021-08-13 PROCEDURE — 80053 COMPREHEN METABOLIC PANEL: CPT

## 2021-08-13 PROCEDURE — 85025 COMPLETE CBC W/AUTO DIFF WBC: CPT

## 2021-08-13 RX ORDER — ANASTROZOLE 1 MG/1
1 TABLET ORAL DAILY
Qty: 30 TABLET | Refills: 6 | Status: SHIPPED | OUTPATIENT
Start: 2021-08-13 | End: 2021-09-12

## 2021-08-13 RX ORDER — TAMOXIFEN CITRATE 10 MG/1
10 TABLET ORAL
COMMUNITY
Start: 2021-04-15 | End: 2022-02-16

## 2021-08-25 ENCOUNTER — OFFICE VISIT (OUTPATIENT)
Dept: OBSTETRICS AND GYNECOLOGY | Facility: CLINIC | Age: 51
End: 2021-08-25

## 2021-08-25 VITALS
DIASTOLIC BLOOD PRESSURE: 74 MMHG | SYSTOLIC BLOOD PRESSURE: 110 MMHG | BODY MASS INDEX: 27.11 KG/M2 | HEIGHT: 69 IN | WEIGHT: 183 LBS

## 2021-08-25 DIAGNOSIS — E89.40 SURGICAL MENOPAUSE: ICD-10-CM

## 2021-08-25 DIAGNOSIS — Z90.722 S/P BSO (BILATERAL SALPINGO-OOPHORECTOMY): Primary | ICD-10-CM

## 2021-08-25 PROCEDURE — 99024 POSTOP FOLLOW-UP VISIT: CPT | Performed by: STUDENT IN AN ORGANIZED HEALTH CARE EDUCATION/TRAINING PROGRAM

## 2021-09-21 ENCOUNTER — TELEPHONE (OUTPATIENT)
Dept: ONCOLOGY | Facility: CLINIC | Age: 51
End: 2021-09-21

## 2021-09-21 ENCOUNTER — TELEPHONE (OUTPATIENT)
Dept: ONCOLOGY | Facility: HOSPITAL | Age: 51
End: 2021-09-21

## 2021-09-21 NOTE — TELEPHONE ENCOUNTER
PT CALLING C/O INSOMNIA R/T ARIMIDEX. PT HAS TRIED DIFFERENT DOSES OF MELATONIN W/O SUCCESS. DENIES ANY OTHER SE'S. PER JL NP PT TO TRY OTC BENADRYL OR TYL PM AND SEE IF THAT HELPS. IF PT STILL HAS ISSUES PT TO CALL BACK NEXT WK AND WE WOULD NEED TO GO TO DR. CARVALHO AT THAT POINT. PT MADE AWARE AND SHE V/U.

## 2021-09-21 NOTE — TELEPHONE ENCOUNTER
Caller: GUSTAVO    Relationship: PATIENT    Best call back number: [phone]     What medications are you currently taking:   Current Outpatient Medications on File Prior to Visit   Medication Sig Dispense Refill   • ibuprofen (ADVIL,MOTRIN) 800 MG tablet Take 1 tablet by mouth Every 6 (Six) Hours As Needed for Mild Pain . 30 tablet 1   • loratadine-pseudoephedrine ([facility] Allergy Relief-D)  MG per 24 hr tablet Take 1 tablet by mouth Daily. 30 tablet 2   • Multiple Vitamin (MULTIVITAMIN) tablet Take 1 tablet by mouth Daily.     • Nutritional Supplements (VITAMIN D MAINTENANCE PO) Take 1 tablet by mouth Daily.     • tamoxifen (NOLVADEX) 10 MG tablet 10 mg.     • traMADol (ULTRAM) 50 MG tablet Take 1 tablet by mouth Every 6 (Six) Hours As Needed for Moderate Pain . 12 tablet 0     No current facility-administered medications on file prior to visit.          When did you start taking these medications: 08/11    Which medication are you concerned about: ARIMIDEX    Who prescribed you this medication: DR CARVALHO    What are your concerns: SHE HAS INTENSE NIGHT SWEATS BUT HER MAIN CONCERN IS THE INSOMNIA. SHE'S BEEN TAKING MELATONIN TO TRY TO HELP HER SLEEP BUT SAYS THEY SEEM TO KICK IN THE NEXT DAY WHILE SHE'S AT WORK.     How long have you had these concerns: SINCE NEAR THE LAST WEEK OF AUGUST

## 2021-10-08 ENCOUNTER — TELEPHONE (OUTPATIENT)
Dept: ONCOLOGY | Facility: CLINIC | Age: 51
End: 2021-10-08

## 2021-11-09 DIAGNOSIS — J30.1 SEASONAL ALLERGIC RHINITIS DUE TO POLLEN: ICD-10-CM

## 2021-11-09 RX ORDER — NICOTINE 14MG/24HR
1 PATCH, TRANSDERMAL 24 HOURS TRANSDERMAL DAILY
Qty: 30 TABLET | Refills: 2 | Status: SHIPPED | OUTPATIENT
Start: 2021-11-09 | End: 2022-02-16 | Stop reason: SDUPTHER

## 2021-12-03 ENCOUNTER — TELEPHONE (OUTPATIENT)
Dept: FAMILY MEDICINE CLINIC | Facility: CLINIC | Age: 51
End: 2021-12-03

## 2022-02-13 DIAGNOSIS — J30.1 SEASONAL ALLERGIC RHINITIS DUE TO POLLEN: ICD-10-CM

## 2022-02-15 RX ORDER — NICOTINE 14MG/24HR
PATCH, TRANSDERMAL 24 HOURS TRANSDERMAL
Qty: 30 TABLET | Refills: 2 | OUTPATIENT
Start: 2022-02-15

## 2022-02-15 NOTE — TELEPHONE ENCOUNTER
PATIENT HAS SCHEDULED APPOINTMENT FOR 2/22/2022, HAS 2 TABLETS REMAINING. WOULD LIKE TO REQUEST ENOUGH TO LAST UNTIL APPOINTMENT.    loratadine-pseudoephedrine ([facility] Allergy Relief-D)  MG per 24 hr tablet    PHARMACY [facility]/pharmacy #[id number] - [place], [place] - [address] - [phone]  - [phone]   [phone]    CALL BACK [phone]

## 2022-02-16 ENCOUNTER — LAB (OUTPATIENT)
Dept: LAB | Facility: HOSPITAL | Age: 52
End: 2022-02-16

## 2022-02-16 ENCOUNTER — OFFICE VISIT (OUTPATIENT)
Dept: ONCOLOGY | Facility: CLINIC | Age: 52
End: 2022-02-16

## 2022-02-16 VITALS
HEART RATE: 87 BPM | HEIGHT: 69 IN | TEMPERATURE: 96.6 F | SYSTOLIC BLOOD PRESSURE: 124 MMHG | BODY MASS INDEX: 26.42 KG/M2 | OXYGEN SATURATION: 100 % | DIASTOLIC BLOOD PRESSURE: 78 MMHG | RESPIRATION RATE: 16 BRPM | WEIGHT: 178.4 LBS

## 2022-02-16 DIAGNOSIS — C50.412 MALIGNANT NEOPLASM OF UPPER-OUTER QUADRANT OF LEFT BREAST IN FEMALE, ESTROGEN RECEPTOR POSITIVE: ICD-10-CM

## 2022-02-16 DIAGNOSIS — J30.1 SEASONAL ALLERGIC RHINITIS DUE TO POLLEN: ICD-10-CM

## 2022-02-16 DIAGNOSIS — C50.412 MALIGNANT NEOPLASM OF UPPER-OUTER QUADRANT OF LEFT BREAST IN FEMALE, ESTROGEN RECEPTOR POSITIVE: Primary | ICD-10-CM

## 2022-02-16 DIAGNOSIS — Z17.0 MALIGNANT NEOPLASM OF UPPER-OUTER QUADRANT OF LEFT BREAST IN FEMALE, ESTROGEN RECEPTOR POSITIVE: ICD-10-CM

## 2022-02-16 DIAGNOSIS — Z17.0 MALIGNANT NEOPLASM OF UPPER-OUTER QUADRANT OF LEFT BREAST IN FEMALE, ESTROGEN RECEPTOR POSITIVE: Primary | ICD-10-CM

## 2022-02-16 DIAGNOSIS — Z79.811 AROMATASE INHIBITOR USE: ICD-10-CM

## 2022-02-16 LAB
ALBUMIN SERPL-MCNC: 4.2 G/DL (ref 3.5–5.2)
ALBUMIN/GLOB SERPL: 1.3 G/DL (ref 1.1–2.4)
ALP SERPL-CCNC: 91 U/L (ref 38–116)
ALT SERPL W P-5'-P-CCNC: 13 U/L (ref 0–33)
ANION GAP SERPL CALCULATED.3IONS-SCNC: 8.7 MMOL/L (ref 5–15)
AST SERPL-CCNC: 15 U/L (ref 0–32)
BASOPHILS # BLD AUTO: 0.03 10*3/MM3 (ref 0–0.2)
BASOPHILS NFR BLD AUTO: 0.5 % (ref 0–1.5)
BILIRUB SERPL-MCNC: 0.3 MG/DL (ref 0.2–1.2)
BUN SERPL-MCNC: 9 MG/DL (ref 6–20)
BUN/CREAT SERPL: 10 (ref 7.3–30)
CALCIUM SPEC-SCNC: 9.8 MG/DL (ref 8.5–10.2)
CHLORIDE SERPL-SCNC: 105 MMOL/L (ref 98–107)
CO2 SERPL-SCNC: 29.3 MMOL/L (ref 22–29)
CREAT SERPL-MCNC: 0.9 MG/DL (ref 0.6–1.1)
DEPRECATED RDW RBC AUTO: 45 FL (ref 37–54)
EOSINOPHIL # BLD AUTO: 0.08 10*3/MM3 (ref 0–0.4)
EOSINOPHIL NFR BLD AUTO: 1.4 % (ref 0.3–6.2)
ERYTHROCYTE [DISTWIDTH] IN BLOOD BY AUTOMATED COUNT: 13.2 % (ref 12.3–15.4)
GFR SERPL CREATININE-BSD FRML MDRD: 80 ML/MIN/1.73
GLOBULIN UR ELPH-MCNC: 3.2 GM/DL (ref 1.8–3.5)
GLUCOSE SERPL-MCNC: 72 MG/DL (ref 74–124)
HCT VFR BLD AUTO: 44.6 % (ref 34–46.6)
HGB BLD-MCNC: 14 G/DL (ref 12–15.9)
IMM GRANULOCYTES # BLD AUTO: 0.01 10*3/MM3 (ref 0–0.05)
IMM GRANULOCYTES NFR BLD AUTO: 0.2 % (ref 0–0.5)
LYMPHOCYTES # BLD AUTO: 2.15 10*3/MM3 (ref 0.7–3.1)
LYMPHOCYTES NFR BLD AUTO: 38.2 % (ref 19.6–45.3)
MCH RBC QN AUTO: 29 PG (ref 26.6–33)
MCHC RBC AUTO-ENTMCNC: 31.4 G/DL (ref 31.5–35.7)
MCV RBC AUTO: 92.5 FL (ref 79–97)
MONOCYTES # BLD AUTO: 0.47 10*3/MM3 (ref 0.1–0.9)
MONOCYTES NFR BLD AUTO: 8.3 % (ref 5–12)
NEUTROPHILS NFR BLD AUTO: 2.89 10*3/MM3 (ref 1.7–7)
NEUTROPHILS NFR BLD AUTO: 51.4 % (ref 42.7–76)
NRBC BLD AUTO-RTO: 0 /100 WBC (ref 0–0.2)
PLATELET # BLD AUTO: 237 10*3/MM3 (ref 140–450)
PMV BLD AUTO: 10.5 FL (ref 6–12)
POTASSIUM SERPL-SCNC: 4.6 MMOL/L (ref 3.5–4.7)
PROT SERPL-MCNC: 7.4 G/DL (ref 6.3–8)
RBC # BLD AUTO: 4.82 10*6/MM3 (ref 3.77–5.28)
SODIUM SERPL-SCNC: 143 MMOL/L (ref 134–145)
WBC NRBC COR # BLD: 5.63 10*3/MM3 (ref 3.4–10.8)

## 2022-02-16 PROCEDURE — 80053 COMPREHEN METABOLIC PANEL: CPT

## 2022-02-16 PROCEDURE — 99214 OFFICE O/P EST MOD 30 MIN: CPT | Performed by: INTERNAL MEDICINE

## 2022-02-16 PROCEDURE — 85025 COMPLETE CBC W/AUTO DIFF WBC: CPT

## 2022-02-16 PROCEDURE — 36415 COLL VENOUS BLD VENIPUNCTURE: CPT

## 2022-02-16 RX ORDER — NICOTINE 14MG/24HR
1 PATCH, TRANSDERMAL 24 HOURS TRANSDERMAL DAILY
Qty: 30 TABLET | Refills: 0 | Status: SHIPPED | OUTPATIENT
Start: 2022-02-16 | End: 2022-02-22 | Stop reason: SDUPTHER

## 2022-02-16 NOTE — PROGRESS NOTES
Subjective     REASON FOR CONSULTATION:   1.Left breast cancer pT1cN 0- ER [place] positive HER-2 negative low-grade infiltrating ductal carcinoma Oncotype score of 6 postmastectomy and sentinel node biopsy on 8/20/19  2.  Oncotype score of 6 tamoxifen prescribed                               REQUESTING PHYSICIAN:  MD Dannielle Campa MD    History of Present Illness patient is a 51-year-old premenopausal woman with a small hormone positive breast cancer which is low-grade with a low Oncotype score.  Of 6    She was on tamoxifenfor 24  months and was tolerating it well but then was hospitalized at [facility] [place] in April with a ruptured cyst in her left ovary the cause significant pain and then has developed a fairly significant cyst on her right ovary.  Her gynecologist is given her 2 options-either have her ovaries removed or switch to another medication and she opted for bilateral oophorectomy  She had her bilateral salpingo-oophorectomy-the path reports that showed no malignancy or atypia.  Fibroid was removed and I am not sure how this showed up unless the fibroid was on the ovary because she has had a hysterectomy in the past    mammogram on the right was done in June and benign she never had a bone density we will get a baseline    she never had a colonoscopy and she finally had a done in March 2021 and it was benign    She is having a lot of hot flashes with the Arimidex but does not want any medication for it.        Past Medical History:   Diagnosis Date   • Breast cancer (HCC) 05/2019    LEFT BREAST   • Fibrocystic breast    • History of palpitations    • Ovarian cyst rupture 04/2021   • PONV (postoperative nausea and vomiting)     GAVE TOO MUCH AFTER HYSTERECTOMY   • Seasonal allergies         Past Surgical History:   Procedure Laterality Date   • BILATERAL BREAST REDUCTION Right 12/19/2019    Procedure: RIGHT REDUCTION FOR SYMMETRY;   Surgeon: PATRICIO Levine MD;  Location: [facility] OR;  Service: Plastics   • BREAST BIOPSY     • BREAST RECONSTRUCTION Left 8/15/2019    Procedure: LEFT PLACEMENT TISSUE EXPANDER WITH ALLORDERM;  Surgeon: PATRICIO Levine MD;  Location: [facility] OR;  Service: Plastics   • BREAST TISSUE EXPANDER REMOVAL INSERTION OF IMPLANT Bilateral 12/19/2019    Procedure: REMOVAL LEFT TISSUE EXPANDER AND PLACEMENT OF IMPLANT;  Surgeon: PATRICIO Levine MD;  Location: [facility] MAIN OR;  Service: Plastics   • BUNIONECTOMY Bilateral     BUNIONECTOMY-2005 AND 2015   • COLONOSCOPY N/A 3/15/2021    Procedure: COLONOSCOPY TO CECUM AND TI;  Surgeon: Heather Montero MD;  Location: [facility] ENDOSCOPY;  Service: Gastroenterology;  Laterality: N/A;  SCREENING  POST- HEMORRHOIDS   • DIAGNOSTIC LAPAROSCOPY Bilateral 8/11/2021    Procedure: DIAGNOSTIC LAPAROSCOPY, SALPINGO OOPHORECTOMY LAPAROSCOPIC;  Surgeon: Gertrude Humphries MD;  Location: [facility] OR OSC;  Service: Obstetrics/Gynecology;  Laterality: Bilateral;   • DILATATION AND CURETTAGE  1998   • DILATATION AND CURETTAGE  1991    Vaginal warts   • HYSTERECTOMY  08/2008   • MASTECTOMY W/ SENTINEL NODE BIOPSY Left 8/15/2019    Procedure: LEFt total mastectomy, LEFT axilla sentinel node biopsy,  immediate recosntruction;  Surgeon: Sarah Barker MD;  Location: [facility] OR;  Service: General   • VAGINAL HYSTERECTOMY     • WISDOM TOOTH EXTRACTION      ONC HISTTORY  patient is a 49-year-old -American female in excellent health on no chronic medications who was noted on her routine mammogram after 3-year break to have an abnormality in the left breast.  The patient thought she had some tenderness and showed this to her family doctor who sent her for a mammogram and this did show a definite difference from 3 years previously in that a12 mm abnormality was noted in her left breast at the 12:30 position.  Diagnostic mammogram showed in addition  that a to the lesion at 1230 there was  another abnormality measuring 7 mm at the 1:00 region and several groups of indistinct calcifications were also noted in this area.  The ultrasound confirmed a solid mass measuring 11 x 10 at 1230 and a 4 x 4 millimeter mass at 4:00 in addition to 2 other cysts biopsy was done.on 2019 with the lesion at 12:00 showing atypical ductal hyperplasia measuring 1 mm negative for invasive cancer in the lesion at 1230 showed invasive mammary carcinoma no special type low-grade measuring 9 mm associated with low-grade DCIS and microcalcifications tumor was 90% ER +94% MO positive HER-2 negative low Ki-67 of 5.7%  Patient underwent bilateral breast MRI on 2019 and this showed the biopsy-proven mass at the 12 o'clock position measuring 1.5 cm residual mass in the 3 o'clock position marker from the ADH showed no suspicious residual enhancement.  There is no obvious adenopathy in the right breast was benign    Because of the multifocal nature of the disease the patient opted for a left mastectomy with immediate reconstruction and the final pathology specimen showed a 12 x 12 mm low-grade 1 invasive ductal carcinoma with 2-negative sentinel nodes.  There was associated DCIS measuring 8 x 6 mm and margins were all clear    Patient is  4 para 2 1 miscarriage and one  first childbirth was at age 23 she did not breast-feed she was on birth control for 1 year and has not had appeared since  when she had a hysterectomy for adenomyosis.  She is probably premenopausal as she has had no symptoms of menopause    Family history is completely negative for breast cancer ovarian cancer or any other malignancy that she is aware of father  at 60 of a heart attack mother 73 she has one brother who is healthy.    She is here today to discuss adjuvant treatment her Oncotype score came back at 6 Which I told her was excellent Which predicts no benefit from chemotherapy and distant recurrence at 9 years of 3% with  hormonal blockade  We discussed tamoxifen as she is likely premenopausal and the side effect profile  The side effects and toxicities of Tamoxifen were discussed with the patient, including hot flashes, mood swings,depression, DVT and endometrial cancer. A list of drugs that interfere with the efficacy of tamoxifen and are to be avoided were given to the patient.  Estradiol FSH and LH levels have been drawn today to ensure that she is premenopausal to confirm she is premenopausal  Discussed in detail the side effects of tamoxifen and the information from the Oncotype DX score and she is very happy to get away without chemotherapy and agreeable to tamoxifen but she will call me next week to check her menopausal status before taking it.  No radiation is planned    12/19   She has been on tamoxifen for about 3 months now and after the first month developed dizziness and somnolence and we asked her to cut back to 10 mg for a couple of weeks but she misunderstood and has stayed on 10 mg and is tolerating this fairly well overall.    When she was seen by Dr. Gooden and December 2019 she increased the tamoxifen back up to 20 mg and has been tolerating that well since that time.  She has noticed some increased tearfulness.  She does have upcoming nipple reconstruction and follows up with Dr. Levine tomorrow in preparation for this.  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        Current Outpatient Medications on File Prior to Visit   Medication Sig Dispense Refill   • anastrozole (ARIMIDEX) 1 MG tablet      • ibuprofen (ADVIL,MOTRIN) 800 MG tablet Take 1 tablet by mouth Every 6 (Six) Hours As Needed for Mild Pain . Take with food/ milk. 30 tablet 0   • loratadine-pseudoephedrine ([employer] Allergy Relief-D)  MG per 24 hr tablet Take 1 tablet by  mouth Daily. 30 tablet 2   • Multiple Vitamin (MULTIVITAMIN) tablet Take 1 tablet by mouth Daily.     • Nutritional Supplements (VITAMIN D MAINTENANCE PO) Take 1 tablet by mouth Daily.     • traMADol (ULTRAM) 50 MG tablet Take 1 tablet by mouth Every 6 (Six) Hours As Needed for Moderate Pain . 12 tablet 0   • [DISCONTINUED] tamoxifen (NOLVADEX) 10 MG tablet 10 mg.       No current facility-administered medications on file prior to visit.        ALLERGIES:    Allergies   Allergen Reactions   • Codeine Rash   • Percocet [Oxycodone-Acetaminophen] Itching        Social History     Socioeconomic History   • Marital status:    • Years of education: College   Tobacco Use   • Smoking status: Never Smoker   • Smokeless tobacco: Never Used   Substance and Sexual Activity   • Alcohol use: Yes     Comment: 3 PER MONTH-SOCIALLY   • Drug use: No   • Sexual activity: Yes     Birth control/protection: Surgical        Family History   Problem Relation Age of Onset   • Diabetes Mother    • Heart disease Mother    • Hypertension Mother    • Hyperlipidemia Mother    • Cancer Maternal Aunt         OF UNKNOWN ORGIN   • Diabetes Maternal Aunt    • Hyperlipidemia Maternal Aunt    • Ovarian cancer Paternal Aunt    • Lung cancer Maternal Grandmother    • Brain cancer Maternal Grandmother    • Hypertension Father    • Heart attack Father    • Seizures Brother    • Malig Hyperthermia Neg Hx         Review of Systems   Constitutional: Negative.  Negative for appetite change, chills, diaphoresis, fatigue, fever and unexpected weight change.   HENT: Negative for hearing loss, sore throat and trouble swallowing.    Respiratory: Negative for cough, chest tightness, shortness of breath and wheezing.    Cardiovascular: Negative for chest pain (nerve like pain under left axilla intermittently), palpitations and leg swelling.   Gastrointestinal: Negative for abdominal distention, abdominal pain, diarrhea, nausea and vomiting.   Genitourinary:  "Negative for dysuria, frequency, hematuria and urgency.   Musculoskeletal: Negative for joint swelling.        No muscle weakness.   Skin: Negative for rash and wound.   Neurological: Negative for seizures, syncope, speech difficulty, weakness, numbness and headaches.   Hematological: Negative for adenopathy. Does not bruise/bleed easily.   Psychiatric/Behavioral: Negative for behavioral problems, confusion and suicidal ideas. The patient is not nervous/anxious.    All other systems reviewed and are negative.   I have reviewed and confirmed the accuracy of the ROS as documented by the MA/LPN/RN Tanner Gooden MD        Objective     Vitals:    02/16/22 1027   BP: 124/78   Pulse: 87   Resp: 16   Temp: 96.6 °F (35.9 °C)   TempSrc: Temporal   SpO2: 100%   Weight: 80.9 kg (178 lb 6.4 oz)   Height: 175.3 cm (69.02\")   PainSc: 0-No pain     Current Status 1/28/2021   ECOG score 0       Physical Exam   GENERAL:  Well-developed, well-nourished in no acute distress.   SKIN:  Warm, dry without rashes, purpura or petechiae.  EYES:  Pupils equal, round and reactive to light.  EOMs intact.  Conjunctivae normal.  EARS:  Hearing intact.  NOSE:  Septum midline.  No excoriations or nasal discharge.  MOUTH:  Tongue is well-papillated; no stomatitis or ulcers.  Lips normal.  THROAT:  Oropharynx without lesions or exudates.  NECK:  Supple with good range of motion; no thyromegaly or masses, no JVD.  LYMPHATICS:  No cervical, supraclavicular, axillary adenopathy.  CHEST:  Lungs clear to auscultation. Good airflow.  BREASTS: Right breast is benign left implant in place with incisions healed.  CARDIAC:  Regular rate and rhythm without murmurs, rubs or gallops. Normal S1,S2.  ABDOMEN:  Soft,  tender due to surgery 2 days ago with laparoscopic wounds well-healed with no hepatosplenomegaly or masses.  EXTREMITIES:  No clubbing, cyanosis or edema.  NEUROLOGICAL:  Cranial Nerves II-XII grossly intact.  No focal neurological " deficits.  PSYCHIATRIC:  Normal affect and mood.      I have reexamined the patient and the results are consistent with the previously documented exam. Tanner Gooden MD       RECENT LABS:  Hematology WBC   Date Value Ref Range Status   02/16/2022 5.63 3.40 - 10.80 10*3/mm3 Final     RBC   Date Value Ref Range Status   02/16/2022 4.82 3.77 - 5.28 10*6/mm3 Final     Hemoglobin   Date Value Ref Range Status   02/16/2022 14.0 12.0 - 15.9 g/dL Final     Hematocrit   Date Value Ref Range Status   02/16/2022 44.6 34.0 - 46.6 % Final     Platelets   Date Value Ref Range Status   02/16/2022 237 140 - 450 10*3/mm3 Final            Pathology          Study Result     BILATERAL BREAST MRI WITHOUT AND WITH CONTRAST       IMPRESSION:  1. Biopsy-proven malignancy in the left breast at the 12 o'clock  position with the more posterior of the barrel-shaped metallic clip seen  centrally within the residual mass which measures on the order of 1.5 cm  in greatest dimension. The more anterior barrel-shaped metallic clips is  located within the hematoma. No other suspicious findings are seen  within the left breast and there is no evidence for left axillary  adenopathy.  2. The top hat shaped metallic clip at the 3 o'clock position  corresponds to the site of ADH and is not associated with any residual  suspicious enhancement. The metallic clip is seen within a dominant  hematoma cavity that measures on the order of 4 cm in greatest  dimension.  3. There are no findings suspicious for malignancy in the right breast.     BI-RADS Category 6: Known malignancy.     This report was finalized on 7/8/2019 11:51 AM by Dr. Harjinder Encinas M.D.               Tissue Pathology Exam: [id number]     Final Diagnosis   1. Right Breast, Reduction Mammoplasty (78.5 grams):               A. Benign skin and breast tissue.               B. No atypical hyperplasia, in situ nor invasive carcinoma identified.     swm/amritam    Electronically signed by  Sujey Baig MD on 12/20/2019 at 1504     Final Diagnosis   1. Left and Right Fallopian Tubes and Ovaries, Bilateral Salpingo-oophorectomy:           Benign ovaries and fallopian tubes with                A. Benign ovaries with                            1. Hemorrhagic cysts.                            2. Cortical inclusion cysts.  3. Adhesion to the fallopian tube by one of the ovaries.  4. Corporal albicantia.  5. Hemorrhagic corpus luteal cyst.   B. Benign fallopian tubes with complete cross sections of both fallopian tubes with  1. Paratubal cysts in one fallopian tube.  2. An area of endosalpingiosis in one of the fallopian tubes.      2. Fibroid:               A. Leiomyoma.          Assessment/Plan   1.  T1cN0 low-grade ER NE positive HER-2 negative infiltrating ductal carcinoma left breast with associated DCIS multifocal post mastectomy and sentinel node biopsy-Oncotype score of 6  · Tamoxifen prescribed in 9/19-patient cut to 10 mg after 1 month and stayed on this dose till 12/19 when escalated to 20 mg  · Patient continues the tamoxifen 20 mg daily.  · Post bilateral salpingo-oophorectomy in 8/21 switch to Arimidex  · Tolerating Arimidex OK with hot flashes 2/22    2.   negative 9 gene invitae panel testing    3.  Increased tearfulness.  Improved    4.?  Blood in stool - colonoscopy neg 3/21    Plan  1.  Continue Arimidex 1 mg daily   2.  Follow-up with me  in 6 months.  3. dexa for baseline        She tells me her insurance company does not pay for nurse practitioners and therefore we will have to make sure that she is not scheduled with 1 of them

## 2022-02-22 ENCOUNTER — OFFICE VISIT (OUTPATIENT)
Dept: FAMILY MEDICINE CLINIC | Facility: CLINIC | Age: 52
End: 2022-02-22

## 2022-02-22 VITALS
BODY MASS INDEX: 26.36 KG/M2 | WEIGHT: 178 LBS | SYSTOLIC BLOOD PRESSURE: 112 MMHG | TEMPERATURE: 97.7 F | HEIGHT: 69 IN | OXYGEN SATURATION: 98 % | DIASTOLIC BLOOD PRESSURE: 76 MMHG | HEART RATE: 86 BPM

## 2022-02-22 DIAGNOSIS — Z00.00 HEALTHCARE MAINTENANCE: Primary | ICD-10-CM

## 2022-02-22 DIAGNOSIS — J30.1 SEASONAL ALLERGIC RHINITIS DUE TO POLLEN: ICD-10-CM

## 2022-02-22 PROCEDURE — 90472 IMMUNIZATION ADMIN EACH ADD: CPT | Performed by: FAMILY MEDICINE

## 2022-02-22 PROCEDURE — 90715 TDAP VACCINE 7 YRS/> IM: CPT | Performed by: FAMILY MEDICINE

## 2022-02-22 PROCEDURE — 99396 PREV VISIT EST AGE 40-64: CPT | Performed by: FAMILY MEDICINE

## 2022-02-22 PROCEDURE — 90471 IMMUNIZATION ADMIN: CPT | Performed by: FAMILY MEDICINE

## 2022-02-22 PROCEDURE — 90750 HZV VACC RECOMBINANT IM: CPT | Performed by: FAMILY MEDICINE

## 2022-02-22 RX ORDER — NICOTINE 14MG/24HR
1 PATCH, TRANSDERMAL 24 HOURS TRANSDERMAL DAILY
Qty: 90 TABLET | Refills: 3 | Status: SHIPPED | OUTPATIENT
Start: 2022-02-22 | End: 2023-02-17

## 2022-02-22 NOTE — PROGRESS NOTES
Екатерина Strauss is here today for an annual physical exam.     Patient has a known history of breast cancer and has had a single mastectomy and follows with oncology.  Is on arimidex only now.     Eating a healthy diet. Exercising routinely.   Postmenopausal. wears seat belt. Feels safe at home.   Sexual activity: no  Birth control: Has had a hysterectomy including cervix and ovaries per pt  Pregnancy:   (1  and 1 miscarriage)  Sexual and gender orientation: heterosexual female    PHQ-2 Depression Screening  Little interest or pleasure in doing things? 0   Feeling down, depressed, or hopeless? 0   PHQ-2 Total Score 0         I have reviewed the patient's medical, family, and social history in detail and updated the computerized patient record.    Screening history:  Colonoscopy - utd  Mammogram- has mamm scheduled with oncology soon   Pap/pelvic -has had a hysterectomy, non-cancerous reasons  Metabolic -due    Health Maintenance   Topic Date Due   • TDAP/TD VACCINES (2 - Td or Tdap) 2020   • ZOSTER VACCINE (1 of 2) Never done   • ANNUAL PHYSICAL  2022   • MAMMOGRAM  06/10/2023   • COLORECTAL CANCER SCREENING  03/15/2031   • HEPATITIS C SCREENING  Completed   • COVID-19 Vaccine  Completed   • Pneumococcal Vaccine 0-64  Aged Out   • INFLUENZA VACCINE  Discontinued   • PAP SMEAR  Discontinued       Review of Systems   Constitutional: Negative for fever.   HENT: Negative for hearing loss.    Eyes: Negative for visual disturbance.   Respiratory: Negative for shortness of breath.    Cardiovascular: Negative for chest pain.   Gastrointestinal: Negative for constipation and diarrhea.   Genitourinary: Negative for difficulty urinating.   Musculoskeletal: Negative for arthralgias and myalgias.   Skin: Negative for rash.   Hematological: Does not bruise/bleed easily.   Psychiatric/Behavioral: Negative for dysphoric mood.       /76 (BP Location: Right arm, Patient Position: Sitting)   Pulse 86   " Temp 97.7 °F (36.5 °C)   Ht 175.3 cm (69.02\")   Wt 80.7 kg (178 lb)   LMP  (LMP Unknown)   SpO2 98%   BMI 26.27 kg/m²      Physical Exam    Vital signs reviewed.  General appearance: No acute distress  Eyes: conjunctiva clear without erythema; pupils equally round and reactive  ENT: external ears and nose normal; hearing normal, oropharynx clear  Neck: supple; no thyromegaly  CV: normal rate and rhythm; no peripheral edema  Respiratory: normal respiratory effort; lungs clear to auscultation bilaterally  MSK: normal gait and station; no focal joint deformity or swelling  Skin: no rash or wounds; normal turgor  Neuro: cranial nerves 2-12 grossly intact; normal sensation to light touch  Psych: mood and affect normal; recent and remote memory intact    Lab on 02/16/2022   Component Date Value Ref Range Status   • Glucose 02/16/2022 72* 74 - 124 mg/dL Final   • BUN 02/16/2022 9  6 - 20 mg/dL Final   • Creatinine 02/16/2022 0.90  0.60 - 1.10 mg/dL Final   • Sodium 02/16/2022 143  134 - 145 mmol/L Final   • Potassium 02/16/2022 4.6  3.5 - 4.7 mmol/L Final   • Chloride 02/16/2022 105  98 - 107 mmol/L Final   • CO2 02/16/2022 29.3* 22.0 - 29.0 mmol/L Final   • Calcium 02/16/2022 9.8  8.5 - 10.2 mg/dL Final   • Total Protein 02/16/2022 7.4  6.3 - 8.0 g/dL Final   • Albumin 02/16/2022 4.20  3.50 - 5.20 g/dL Final   • ALT (SGPT) 02/16/2022 13  0 - 33 U/L Final   • AST (SGOT) 02/16/2022 15  0 - 32 U/L Final   • Alkaline Phosphatase 02/16/2022 91  38 - 116 U/L Final   • Total Bilirubin 02/16/2022 0.3  0.2 - 1.2 mg/dL Final   • eGFR   Amer 02/16/2022 80  >60 mL/min/1.73 Final   • Globulin 02/16/2022 3.2  1.8 - 3.5 gm/dL Final   • A/G Ratio 02/16/2022 1.3  1.1 - 2.4 g/dL Final   • BUN/Creatinine Ratio 02/16/2022 10.0  7.3 - 30.0 Final   • Anion Gap 02/16/2022 8.7  5.0 - 15.0 mmol/L Final   • WBC 02/16/2022 5.63  3.40 - 10.80 10*3/mm3 Final   • RBC 02/16/2022 4.82  3.77 - 5.28 10*6/mm3 Final   • Hemoglobin 02/16/2022 " 14.0  12.0 - 15.9 g/dL Final   • Hematocrit 02/16/2022 44.6  34.0 - 46.6 % Final   • MCV 02/16/2022 92.5  79.0 - 97.0 fL Final   • MCH 02/16/2022 29.0  26.6 - 33.0 pg Final   • MCHC 02/16/2022 31.4* 31.5 - 35.7 g/dL Final   • RDW 02/16/2022 13.2  12.3 - 15.4 % Final   • RDW-SD 02/16/2022 45.0  37.0 - 54.0 fl Final   • MPV 02/16/2022 10.5  6.0 - 12.0 fL Final   • Platelets 02/16/2022 237  140 - 450 10*3/mm3 Final   • Neutrophil % 02/16/2022 51.4  42.7 - 76.0 % Final   • Lymphocyte % 02/16/2022 38.2  19.6 - 45.3 % Final   • Monocyte % 02/16/2022 8.3  5.0 - 12.0 % Final   • Eosinophil % 02/16/2022 1.4  0.3 - 6.2 % Final   • Basophil % 02/16/2022 0.5  0.0 - 1.5 % Final   • Immature Grans % 02/16/2022 0.2  0.0 - 0.5 % Final   • Neutrophils, Absolute 02/16/2022 2.89  1.70 - 7.00 10*3/mm3 Final   • Lymphocytes, Absolute 02/16/2022 2.15  0.70 - 3.10 10*3/mm3 Final   • Monocytes, Absolute 02/16/2022 0.47  0.10 - 0.90 10*3/mm3 Final   • Eosinophils, Absolute 02/16/2022 0.08  0.00 - 0.40 10*3/mm3 Final   • Basophils, Absolute 02/16/2022 0.03  0.00 - 0.20 10*3/mm3 Final   • Immature Grans, Absolute 02/16/2022 0.01  0.00 - 0.05 10*3/mm3 Final   • nRBC 02/16/2022 0.0  0.0 - 0.2 /100 WBC Final         Current Outpatient Medications:   •  anastrozole (ARIMIDEX) 1 MG tablet, , Disp: , Rfl:   •  ibuprofen (ADVIL,MOTRIN) 800 MG tablet, Take 1 tablet by mouth Every 6 (Six) Hours As Needed for Mild Pain . Take with food/ milk., Disp: 30 tablet, Rfl: 0  •  loratadine-pseudoephedrine ([employer] Allergy Relief-D)  MG per 24 hr tablet, Take 1 tablet by mouth Daily., Disp: 90 tablet, Rfl: 3  •  Multiple Vitamin (MULTIVITAMIN) tablet, Take 1 tablet by mouth Daily., Disp: , Rfl:   •  Nutritional Supplements (VITAMIN D MAINTENANCE PO), Take 1 tablet by mouth Daily., Disp: , Rfl:   •  traMADol (ULTRAM) 50 MG tablet, Take 1 tablet by mouth Every 6 (Six) Hours As Needed for Moderate Pain ., Disp: 12 tablet, Rfl: 0    Diagnoses and all orders for  this visit:    1. Healthcare maintenance (Primary)  -     Tdap Vaccine Greater Than or Equal To 8yo IM  -     Shingrix Vaccine  -     Lipid Panel    2. Seasonal allergic rhinitis due to pollen  -     loratadine-pseudoephedrine ([employer] Allergy Relief-D)  MG per 24 hr tablet; Take 1 tablet by mouth Daily.  Dispense: 90 tablet; Refill: 3        Encourage healthy diet and exercise.  Encourage patient to stay up to date on screening examinations as indicated based on age and risk factors. F/U yearly.

## 2022-03-28 RX ORDER — ANASTROZOLE 1 MG/1
TABLET ORAL
Qty: 30 TABLET | Refills: 6 | Status: SHIPPED | OUTPATIENT
Start: 2022-03-28

## 2022-04-12 NOTE — TELEPHONE ENCOUNTER
Returning call to pt. Pt stated she had an appt with gyn today for hospital f/u from April when she had a cyst rupture. Pt stated they did a vaginal US and found a new cyst on her right ovary. Pt concerned that tamoxifen is not doing its job. Pt stated she was told it was supposed to help put her into menopause and she was told today that she is not in menopause. Pt wondering what next steps would be. Told pt I would run it by Dr. Gooden and see what she recommends. Pt v/u.    · Continue metoprolol

## 2022-04-26 ENCOUNTER — OFFICE VISIT (OUTPATIENT)
Dept: OBSTETRICS AND GYNECOLOGY | Facility: CLINIC | Age: 52
End: 2022-04-26

## 2022-04-26 VITALS
SYSTOLIC BLOOD PRESSURE: 118 MMHG | WEIGHT: 179 LBS | HEIGHT: 69 IN | BODY MASS INDEX: 26.51 KG/M2 | DIASTOLIC BLOOD PRESSURE: 74 MMHG

## 2022-04-26 DIAGNOSIS — Z12.4 CERVICAL CANCER SCREENING: ICD-10-CM

## 2022-04-26 DIAGNOSIS — Z90.722 HISTORY OF BILATERAL SALPINGO-OOPHORECTOMY (BSO): ICD-10-CM

## 2022-04-26 DIAGNOSIS — Z90.79 HISTORY OF BILATERAL SALPINGO-OOPHORECTOMY (BSO): ICD-10-CM

## 2022-04-26 DIAGNOSIS — Z85.3 HISTORY OF BREAST CANCER: ICD-10-CM

## 2022-04-26 DIAGNOSIS — Z01.419 WELL WOMAN EXAM WITH ROUTINE GYNECOLOGICAL EXAM: Primary | ICD-10-CM

## 2022-04-26 PROCEDURE — 99396 PREV VISIT EST AGE 40-64: CPT | Performed by: STUDENT IN AN ORGANIZED HEALTH CARE EDUCATION/TRAINING PROGRAM

## 2022-05-02 LAB
CYTOLOGIST CVX/VAG CYTO: NORMAL
CYTOLOGY CVX/VAG DOC CYTO: NORMAL
CYTOLOGY CVX/VAG DOC THIN PREP: NORMAL
DX ICD CODE: NORMAL
HIV 1 & 2 AB SER-IMP: NORMAL
HPV I/H RISK 4 DNA CVX QL PROBE+SIG AMP: NEGATIVE
OTHER STN SPEC: NORMAL
STAT OF ADQ CVX/VAG CYTO-IMP: NORMAL

## 2022-06-13 ENCOUNTER — APPOINTMENT (OUTPATIENT)
Dept: WOMENS IMAGING | Facility: HOSPITAL | Age: 52
End: 2022-06-13

## 2022-06-13 PROCEDURE — 77067 SCR MAMMO BI INCL CAD: CPT | Performed by: RADIOLOGY

## 2022-06-13 PROCEDURE — 77063 BREAST TOMOSYNTHESIS BI: CPT | Performed by: RADIOLOGY

## 2022-08-10 ENCOUNTER — HOSPITAL ENCOUNTER (OUTPATIENT)
Dept: BONE DENSITY | Facility: HOSPITAL | Age: 52
Discharge: HOME | End: 2022-08-10
Admitting: INTERNAL MEDICINE

## 2022-08-10 DIAGNOSIS — Z17.0 MALIGNANT NEOPLASM OF UPPER-OUTER QUADRANT OF LEFT BREAST IN FEMALE, ESTROGEN RECEPTOR POSITIVE: ICD-10-CM

## 2022-08-10 DIAGNOSIS — C50.412 MALIGNANT NEOPLASM OF UPPER-OUTER QUADRANT OF LEFT BREAST IN FEMALE, ESTROGEN RECEPTOR POSITIVE: ICD-10-CM

## 2022-08-10 PROCEDURE — 77080 DXA BONE DENSITY AXIAL: CPT

## 2022-08-17 ENCOUNTER — LAB (OUTPATIENT)
Dept: LAB | Facility: HOSPITAL | Age: 52
End: 2022-08-17

## 2022-08-17 ENCOUNTER — OFFICE VISIT (OUTPATIENT)
Dept: ONCOLOGY | Facility: CLINIC | Age: 52
End: 2022-08-17

## 2022-08-17 VITALS
SYSTOLIC BLOOD PRESSURE: 121 MMHG | DIASTOLIC BLOOD PRESSURE: 83 MMHG | HEIGHT: 69 IN | BODY MASS INDEX: 26.22 KG/M2 | HEART RATE: 86 BPM | TEMPERATURE: 97.8 F | WEIGHT: 177 LBS | OXYGEN SATURATION: 99 % | RESPIRATION RATE: 18 BRPM

## 2022-08-17 DIAGNOSIS — Z79.811 AROMATASE INHIBITOR USE: ICD-10-CM

## 2022-08-17 DIAGNOSIS — C50.412 MALIGNANT NEOPLASM OF UPPER-OUTER QUADRANT OF LEFT BREAST IN FEMALE, ESTROGEN RECEPTOR POSITIVE: ICD-10-CM

## 2022-08-17 DIAGNOSIS — Z17.0 MALIGNANT NEOPLASM OF UPPER-OUTER QUADRANT OF LEFT BREAST IN FEMALE, ESTROGEN RECEPTOR POSITIVE: Primary | ICD-10-CM

## 2022-08-17 DIAGNOSIS — C50.412 MALIGNANT NEOPLASM OF UPPER-OUTER QUADRANT OF LEFT BREAST IN FEMALE, ESTROGEN RECEPTOR POSITIVE: Primary | ICD-10-CM

## 2022-08-17 DIAGNOSIS — Z17.0 MALIGNANT NEOPLASM OF UPPER-OUTER QUADRANT OF LEFT BREAST IN FEMALE, ESTROGEN RECEPTOR POSITIVE: ICD-10-CM

## 2022-08-17 LAB
ALBUMIN SERPL-MCNC: 4.4 G/DL (ref 3.5–5.2)
ALBUMIN/GLOB SERPL: 1.3 G/DL (ref 1.1–2.4)
ALP SERPL-CCNC: 118 U/L (ref 38–116)
ALT SERPL W P-5'-P-CCNC: 11 U/L (ref 0–33)
ANION GAP SERPL CALCULATED.3IONS-SCNC: 12.4 MMOL/L (ref 5–15)
AST SERPL-CCNC: 16 U/L (ref 0–32)
BASOPHILS # BLD AUTO: 0.04 10*3/MM3 (ref 0–0.2)
BASOPHILS NFR BLD AUTO: 0.7 % (ref 0–1.5)
BILIRUB SERPL-MCNC: 0.3 MG/DL (ref 0.2–1.2)
BUN SERPL-MCNC: 13 MG/DL (ref 6–20)
BUN/CREAT SERPL: 16 (ref 7.3–30)
CALCIUM SPEC-SCNC: 10.2 MG/DL (ref 8.5–10.2)
CHLORIDE SERPL-SCNC: 101 MMOL/L (ref 98–107)
CO2 SERPL-SCNC: 26.6 MMOL/L (ref 22–29)
CREAT SERPL-MCNC: 0.81 MG/DL (ref 0.6–1.1)
DEPRECATED RDW RBC AUTO: 45.1 FL (ref 37–54)
EGFRCR SERPLBLD CKD-EPI 2021: 87.5 ML/MIN/1.73
EOSINOPHIL # BLD AUTO: 0.04 10*3/MM3 (ref 0–0.4)
EOSINOPHIL NFR BLD AUTO: 0.7 % (ref 0.3–6.2)
ERYTHROCYTE [DISTWIDTH] IN BLOOD BY AUTOMATED COUNT: 13.4 % (ref 12.3–15.4)
GLOBULIN UR ELPH-MCNC: 3.5 GM/DL (ref 1.8–3.5)
GLUCOSE SERPL-MCNC: 72 MG/DL (ref 74–124)
HCT VFR BLD AUTO: 45.9 % (ref 34–46.6)
HGB BLD-MCNC: 14.4 G/DL (ref 12–15.9)
IMM GRANULOCYTES # BLD AUTO: 0.01 10*3/MM3 (ref 0–0.05)
IMM GRANULOCYTES NFR BLD AUTO: 0.2 % (ref 0–0.5)
LYMPHOCYTES # BLD AUTO: 2.27 10*3/MM3 (ref 0.7–3.1)
LYMPHOCYTES NFR BLD AUTO: 38 % (ref 19.6–45.3)
MCH RBC QN AUTO: 28.8 PG (ref 26.6–33)
MCHC RBC AUTO-ENTMCNC: 31.4 G/DL (ref 31.5–35.7)
MCV RBC AUTO: 91.8 FL (ref 79–97)
MONOCYTES # BLD AUTO: 0.46 10*3/MM3 (ref 0.1–0.9)
MONOCYTES NFR BLD AUTO: 7.7 % (ref 5–12)
NEUTROPHILS NFR BLD AUTO: 3.16 10*3/MM3 (ref 1.7–7)
NEUTROPHILS NFR BLD AUTO: 52.7 % (ref 42.7–76)
NRBC BLD AUTO-RTO: 0 /100 WBC (ref 0–0.2)
PLATELET # BLD AUTO: 251 10*3/MM3 (ref 140–450)
PMV BLD AUTO: 10.7 FL (ref 6–12)
POTASSIUM SERPL-SCNC: 4.5 MMOL/L (ref 3.5–4.7)
PROT SERPL-MCNC: 7.9 G/DL (ref 6.3–8)
RBC # BLD AUTO: 5 10*6/MM3 (ref 3.77–5.28)
SODIUM SERPL-SCNC: 140 MMOL/L (ref 134–145)
WBC NRBC COR # BLD: 5.98 10*3/MM3 (ref 3.4–10.8)

## 2022-08-17 PROCEDURE — 85025 COMPLETE CBC W/AUTO DIFF WBC: CPT

## 2022-08-17 PROCEDURE — 99214 OFFICE O/P EST MOD 30 MIN: CPT | Performed by: INTERNAL MEDICINE

## 2022-08-17 PROCEDURE — 80053 COMPREHEN METABOLIC PANEL: CPT

## 2022-08-17 PROCEDURE — 36415 COLL VENOUS BLD VENIPUNCTURE: CPT

## 2022-08-24 ENCOUNTER — LAB (OUTPATIENT)
Dept: LAB | Facility: HOSPITAL | Age: 52
End: 2022-08-24

## 2022-08-24 ENCOUNTER — INFUSION (OUTPATIENT)
Dept: ONCOLOGY | Facility: HOSPITAL | Age: 52
End: 2022-08-24

## 2022-08-24 DIAGNOSIS — Z17.0 MALIGNANT NEOPLASM OF UPPER-OUTER QUADRANT OF LEFT BREAST IN FEMALE, ESTROGEN RECEPTOR POSITIVE: ICD-10-CM

## 2022-08-24 DIAGNOSIS — C50.412 MALIGNANT NEOPLASM OF UPPER-OUTER QUADRANT OF LEFT BREAST IN FEMALE, ESTROGEN RECEPTOR POSITIVE: ICD-10-CM

## 2022-08-24 DIAGNOSIS — Z79.811 AROMATASE INHIBITOR USE: Primary | ICD-10-CM

## 2022-08-24 DIAGNOSIS — Z79.811 AROMATASE INHIBITOR USE: ICD-10-CM

## 2022-08-24 LAB
ALBUMIN SERPL-MCNC: 4.2 G/DL (ref 3.5–5.2)
ALBUMIN/GLOB SERPL: 1.2 G/DL (ref 1.1–2.4)
ALP SERPL-CCNC: 117 U/L (ref 38–116)
ALT SERPL W P-5'-P-CCNC: 11 U/L (ref 0–33)
ANION GAP SERPL CALCULATED.3IONS-SCNC: 11.7 MMOL/L (ref 5–15)
AST SERPL-CCNC: 16 U/L (ref 0–32)
BASOPHILS # BLD AUTO: 0.04 10*3/MM3 (ref 0–0.2)
BASOPHILS NFR BLD AUTO: 0.7 % (ref 0–1.5)
BILIRUB SERPL-MCNC: 0.4 MG/DL (ref 0.2–1.2)
BUN SERPL-MCNC: 11 MG/DL (ref 6–20)
BUN/CREAT SERPL: 12.5 (ref 7.3–30)
CALCIUM SPEC-SCNC: 9.8 MG/DL (ref 8.5–10.2)
CHLORIDE SERPL-SCNC: 103 MMOL/L (ref 98–107)
CO2 SERPL-SCNC: 26.3 MMOL/L (ref 22–29)
CREAT SERPL-MCNC: 0.88 MG/DL (ref 0.6–1.1)
DEPRECATED RDW RBC AUTO: 43.8 FL (ref 37–54)
EGFRCR SERPLBLD CKD-EPI 2021: 79.2 ML/MIN/1.73
EOSINOPHIL # BLD AUTO: 0.03 10*3/MM3 (ref 0–0.4)
EOSINOPHIL NFR BLD AUTO: 0.5 % (ref 0.3–6.2)
ERYTHROCYTE [DISTWIDTH] IN BLOOD BY AUTOMATED COUNT: 13.2 % (ref 12.3–15.4)
GLOBULIN UR ELPH-MCNC: 3.5 GM/DL (ref 1.8–3.5)
GLUCOSE SERPL-MCNC: 97 MG/DL (ref 74–124)
HCT VFR BLD AUTO: 44 % (ref 34–46.6)
HGB BLD-MCNC: 14.1 G/DL (ref 12–15.9)
IMM GRANULOCYTES # BLD AUTO: 0.01 10*3/MM3 (ref 0–0.05)
IMM GRANULOCYTES NFR BLD AUTO: 0.2 % (ref 0–0.5)
LYMPHOCYTES # BLD AUTO: 2.42 10*3/MM3 (ref 0.7–3.1)
LYMPHOCYTES NFR BLD AUTO: 42.8 % (ref 19.6–45.3)
MAGNESIUM SERPL-MCNC: 1.7 MG/DL (ref 1.8–2.5)
MCH RBC QN AUTO: 29 PG (ref 26.6–33)
MCHC RBC AUTO-ENTMCNC: 32 G/DL (ref 31.5–35.7)
MCV RBC AUTO: 90.5 FL (ref 79–97)
MONOCYTES # BLD AUTO: 0.44 10*3/MM3 (ref 0.1–0.9)
MONOCYTES NFR BLD AUTO: 7.8 % (ref 5–12)
NEUTROPHILS NFR BLD AUTO: 2.72 10*3/MM3 (ref 1.7–7)
NEUTROPHILS NFR BLD AUTO: 48 % (ref 42.7–76)
NRBC BLD AUTO-RTO: 0 /100 WBC (ref 0–0.2)
PHOSPHATE SERPL-MCNC: 3.7 MG/DL (ref 2.5–4.5)
PLATELET # BLD AUTO: 242 10*3/MM3 (ref 140–450)
PMV BLD AUTO: 10.3 FL (ref 6–12)
POTASSIUM SERPL-SCNC: 4.4 MMOL/L (ref 3.5–4.7)
PROT SERPL-MCNC: 7.7 G/DL (ref 6.3–8)
RBC # BLD AUTO: 4.86 10*6/MM3 (ref 3.77–5.28)
SODIUM SERPL-SCNC: 141 MMOL/L (ref 134–145)
WBC NRBC COR # BLD: 5.66 10*3/MM3 (ref 3.4–10.8)

## 2022-08-24 PROCEDURE — 85025 COMPLETE CBC W/AUTO DIFF WBC: CPT

## 2022-08-24 PROCEDURE — 36415 COLL VENOUS BLD VENIPUNCTURE: CPT

## 2022-08-24 PROCEDURE — 80053 COMPREHEN METABOLIC PANEL: CPT

## 2022-08-24 PROCEDURE — 84100 ASSAY OF PHOSPHORUS: CPT

## 2022-08-24 PROCEDURE — 25010000002 DENOSUMAB 60 MG/ML SOLUTION PREFILLED SYRINGE: Performed by: INTERNAL MEDICINE

## 2022-08-24 PROCEDURE — 96372 THER/PROPH/DIAG INJ SC/IM: CPT

## 2022-08-24 PROCEDURE — 83735 ASSAY OF MAGNESIUM: CPT

## 2022-08-24 RX ADMIN — DENOSUMAB 60 MG: 60 INJECTION SUBCUTANEOUS at 08:26

## 2022-09-13 ENCOUNTER — TELEPHONE (OUTPATIENT)
Dept: FAMILY MEDICINE CLINIC | Facility: CLINIC | Age: 52
End: 2022-09-13

## 2022-09-13 NOTE — TELEPHONE ENCOUNTER
Caller: Екатерина Strauss    Relationship: Self    Best call back number: [phone]     What is the best time to reach you: ANY TIME    Who are you requesting to speak with (clinical staff, provider,  specific staff member): CLINICAL STAFF    What was the call regarding: PATIENT CALLED STATING ASHLIE TOLD HER SHE WAS DUE FOR HER SECOND ROUND OF SHINGLES VACCINATION AND SHE WANTED TO MAKE SURE SHE WAS SUPPOSED TO GET A SECOND ROUND BEFORE SCHEDULING. SHE IS NOT INTERESTED IN GETTING FLU SHOT    PLEASE ADVISE    Do you require a callback: YES

## 2022-09-21 ENCOUNTER — CLINICAL SUPPORT (OUTPATIENT)
Dept: FAMILY MEDICINE CLINIC | Facility: CLINIC | Age: 52
End: 2022-09-21

## 2022-09-21 DIAGNOSIS — Z23 IMMUNIZATION DUE: ICD-10-CM

## 2022-09-21 DIAGNOSIS — Z23 NEED FOR SHINGLES VACCINE: Primary | ICD-10-CM

## 2022-09-21 PROCEDURE — 90471 IMMUNIZATION ADMIN: CPT | Performed by: NURSE PRACTITIONER

## 2022-09-21 PROCEDURE — 90750 HZV VACC RECOMBINANT IM: CPT | Performed by: NURSE PRACTITIONER

## 2022-09-21 NOTE — PROGRESS NOTES
Answers for HPI/ROS submitted by the patient on 9/20/2022  Please describe your symptoms.: 2nd shingles shor  Have you had these symptoms before?: No  How long have you been having these symptoms?: 1-4 days  Please list any medications you are currently taking for this condition.: A on my chart  Please describe any probable cause for these symptoms. : N/a  What is the primary reason for your visit?: Other

## 2023-02-17 DIAGNOSIS — J30.1 SEASONAL ALLERGIC RHINITIS DUE TO POLLEN: ICD-10-CM

## 2023-02-17 RX ORDER — LORATADINE 10 MG
TABLET ORAL
Qty: 30 TABLET | Refills: 11 | Status: SHIPPED | OUTPATIENT
Start: 2023-02-17

## 2023-03-15 ENCOUNTER — OFFICE VISIT (OUTPATIENT)
Dept: ONCOLOGY | Facility: CLINIC | Age: 53
End: 2023-03-15
Payer: COMMERCIAL

## 2023-03-15 ENCOUNTER — INFUSION (OUTPATIENT)
Dept: ONCOLOGY | Facility: HOSPITAL | Age: 53
End: 2023-03-15
Payer: COMMERCIAL

## 2023-03-15 ENCOUNTER — LAB (OUTPATIENT)
Dept: LAB | Facility: HOSPITAL | Age: 53
End: 2023-03-15
Payer: COMMERCIAL

## 2023-03-15 VITALS
SYSTOLIC BLOOD PRESSURE: 118 MMHG | TEMPERATURE: 97.7 F | DIASTOLIC BLOOD PRESSURE: 81 MMHG | BODY MASS INDEX: 25.42 KG/M2 | RESPIRATION RATE: 16 BRPM | HEIGHT: 69 IN | WEIGHT: 171.6 LBS | OXYGEN SATURATION: 100 % | HEART RATE: 80 BPM

## 2023-03-15 DIAGNOSIS — Z79.811 AROMATASE INHIBITOR USE: Primary | ICD-10-CM

## 2023-03-15 DIAGNOSIS — C50.412 MALIGNANT NEOPLASM OF UPPER-OUTER QUADRANT OF LEFT BREAST IN FEMALE, ESTROGEN RECEPTOR POSITIVE: ICD-10-CM

## 2023-03-15 DIAGNOSIS — Z79.811 AROMATASE INHIBITOR USE: ICD-10-CM

## 2023-03-15 DIAGNOSIS — Z17.0 MALIGNANT NEOPLASM OF UPPER-OUTER QUADRANT OF LEFT BREAST IN FEMALE, ESTROGEN RECEPTOR POSITIVE: ICD-10-CM

## 2023-03-15 LAB
ALBUMIN SERPL-MCNC: 4.2 G/DL (ref 3.5–5.2)
ALBUMIN/GLOB SERPL: 1.3 G/DL (ref 1.1–2.4)
ALP SERPL-CCNC: 73 U/L (ref 38–116)
ALT SERPL W P-5'-P-CCNC: 10 U/L (ref 0–33)
ANION GAP SERPL CALCULATED.3IONS-SCNC: 11.4 MMOL/L (ref 5–15)
AST SERPL-CCNC: 14 U/L (ref 0–32)
BASOPHILS # BLD AUTO: 0.05 10*3/MM3 (ref 0–0.2)
BASOPHILS NFR BLD AUTO: 0.9 % (ref 0–1.5)
BILIRUB SERPL-MCNC: 0.4 MG/DL (ref 0.2–1.2)
BUN SERPL-MCNC: 16 MG/DL (ref 6–20)
BUN/CREAT SERPL: 18.2 (ref 7.3–30)
CALCIUM SPEC-SCNC: 9.4 MG/DL (ref 8.5–10.2)
CHLORIDE SERPL-SCNC: 105 MMOL/L (ref 98–107)
CO2 SERPL-SCNC: 25.6 MMOL/L (ref 22–29)
CREAT SERPL-MCNC: 0.88 MG/DL (ref 0.6–1.1)
DEPRECATED RDW RBC AUTO: 42.9 FL (ref 37–54)
EGFRCR SERPLBLD CKD-EPI 2021: 79.2 ML/MIN/1.73
EOSINOPHIL # BLD AUTO: 0.06 10*3/MM3 (ref 0–0.4)
EOSINOPHIL NFR BLD AUTO: 1.1 % (ref 0.3–6.2)
ERYTHROCYTE [DISTWIDTH] IN BLOOD BY AUTOMATED COUNT: 13.1 % (ref 12.3–15.4)
GLOBULIN UR ELPH-MCNC: 3.2 GM/DL (ref 1.8–3.5)
GLUCOSE SERPL-MCNC: 91 MG/DL (ref 74–124)
HCT VFR BLD AUTO: 41.7 % (ref 34–46.6)
HGB BLD-MCNC: 13.8 G/DL (ref 12–15.9)
IMM GRANULOCYTES # BLD AUTO: 0.02 10*3/MM3 (ref 0–0.05)
IMM GRANULOCYTES NFR BLD AUTO: 0.4 % (ref 0–0.5)
LYMPHOCYTES # BLD AUTO: 2.53 10*3/MM3 (ref 0.7–3.1)
LYMPHOCYTES NFR BLD AUTO: 46.3 % (ref 19.6–45.3)
MAGNESIUM SERPL-MCNC: 1.8 MG/DL (ref 1.8–2.5)
MCH RBC QN AUTO: 29.7 PG (ref 26.6–33)
MCHC RBC AUTO-ENTMCNC: 33.1 G/DL (ref 31.5–35.7)
MCV RBC AUTO: 89.7 FL (ref 79–97)
MONOCYTES # BLD AUTO: 0.34 10*3/MM3 (ref 0.1–0.9)
MONOCYTES NFR BLD AUTO: 6.2 % (ref 5–12)
NEUTROPHILS NFR BLD AUTO: 2.46 10*3/MM3 (ref 1.7–7)
NEUTROPHILS NFR BLD AUTO: 45.1 % (ref 42.7–76)
NRBC BLD AUTO-RTO: 0 /100 WBC (ref 0–0.2)
PHOSPHATE SERPL-MCNC: 3.7 MG/DL (ref 2.5–4.5)
PLATELET # BLD AUTO: 235 10*3/MM3 (ref 140–450)
PMV BLD AUTO: 10.9 FL (ref 6–12)
POTASSIUM SERPL-SCNC: 4.1 MMOL/L (ref 3.5–4.7)
PROT SERPL-MCNC: 7.4 G/DL (ref 6.3–8)
RBC # BLD AUTO: 4.65 10*6/MM3 (ref 3.77–5.28)
SODIUM SERPL-SCNC: 142 MMOL/L (ref 134–145)
WBC NRBC COR # BLD: 5.46 10*3/MM3 (ref 3.4–10.8)

## 2023-03-15 PROCEDURE — 83735 ASSAY OF MAGNESIUM: CPT

## 2023-03-15 PROCEDURE — 80053 COMPREHEN METABOLIC PANEL: CPT

## 2023-03-15 PROCEDURE — 36415 COLL VENOUS BLD VENIPUNCTURE: CPT

## 2023-03-15 PROCEDURE — 99214 OFFICE O/P EST MOD 30 MIN: CPT | Performed by: INTERNAL MEDICINE

## 2023-03-15 PROCEDURE — 84100 ASSAY OF PHOSPHORUS: CPT

## 2023-03-15 PROCEDURE — 85025 COMPLETE CBC W/AUTO DIFF WBC: CPT

## 2023-03-15 PROCEDURE — 25010000002 DENOSUMAB 60 MG/ML SOLUTION PREFILLED SYRINGE: Performed by: INTERNAL MEDICINE

## 2023-03-15 PROCEDURE — 96372 THER/PROPH/DIAG INJ SC/IM: CPT

## 2023-03-15 RX ADMIN — DENOSUMAB 60 MG: 60 INJECTION SUBCUTANEOUS at 11:58

## 2023-03-15 NOTE — PROGRESS NOTES
Subjective     REASON FOR CONSULTATION:   1.Left breast cancer pT1cN 0- ER KS positive HER-2 negative low-grade infiltrating ductal carcinoma Oncotype score of 6 postmastectomy and sentinel node biopsy on 8/20/19  2.  Oncotype score of 6 tamoxifen prescribed                               REQUESTING PHYSICIAN:  MD Dannielle Campa MD    History of Present Illness patient is a 51-year-old premenopausal woman with a small hormone positive breast cancer which is low-grade with a low Oncotype score.  Of 6    She was on tamoxifenfor 24  months and was tolerating it well but then was hospitalized at [facility] in April with a ruptured cyst in her left ovary the cause significant pain and then has developed a fairly significant cyst on her right ovary.  Her gynecologist is given her 2 options-either have her ovaries removed or switch to another medication and she opted for bilateral oophorectomy since then she has been on Arimidex and has now been on chemotherapy  3-1/2 years    She had her bilateral salpingo-oophorectomy-the path reports that showed no malignancy or atypia.  Fibroid was removed and I am not sure how this showed up unless the fibroid was on the ovary because she has had a hysterectomy in the past    mammogram on the right was done in June 22 and benign she never had a bone density so baseline was done last month and show significant osteopenia in the spine and hips despite being on tamoxifen for 2 years and exercising.  She has a family history of osteoporosis in her mother-we will add Prolia to her calcium and vitamin D and she is here for dose #2    she never had a colonoscopy and she finally had a done in March 2021 and it was benign    She is having a lot of hot flashes with the Arimidex but does not want any medication for it.        Past Medical History:   Diagnosis Date   • Breast cancer (HCC) 05/2019    LEFT BREAST   •  Fibrocystic breast    • History of palpitations    • Ovarian cyst rupture 04/2021   • PONV (postoperative nausea and vomiting)     GAVE TOO MUCH AFTER HYSTERECTOMY   • Seasonal allergies         Past Surgical History:   Procedure Laterality Date   • BILATERAL BREAST REDUCTION Right 12/19/2019    Procedure: RIGHT REDUCTION FOR SYMMETRY;  Surgeon: PATRICIO Levine MD;  Location: [facility] OR;  Service: Plastics   • BREAST BIOPSY     • BREAST RECONSTRUCTION Left 08/15/2019    Procedure: LEFT PLACEMENT TISSUE EXPANDER WITH ALLORDERM;  Surgeon: PATRICIO Levine MD;  Location: [facility] OR;  Service: Plastics   • BREAST TISSUE EXPANDER REMOVAL INSERTION OF IMPLANT Bilateral 12/19/2019    Procedure: REMOVAL LEFT TISSUE EXPANDER AND PLACEMENT OF IMPLANT;  Surgeon: PATRICIO Levine MD;  Location: [facility] OR;  Service: Plastics   • BUNIONECTOMY Bilateral     BUNIONECTOMY-2005 AND 2015   • COLONOSCOPY N/A 03/15/2021    Procedure: COLONOSCOPY TO CECUM AND TI;  Surgeon: Heather Montero MD;  Location: [facility] ENDOSCOPY;  Service: Gastroenterology;  Laterality: N/A;  SCREENING  POST- HEMORRHOIDS   • DIAGNOSTIC LAPAROSCOPY Bilateral 08/11/2021    Procedure: DIAGNOSTIC LAPAROSCOPY, SALPINGO OOPHORECTOMY LAPAROSCOPIC;  Surgeon: Gertrude Humphries MD;  Location: [facility];  Service: Obstetrics/Gynecology;  Laterality: Bilateral;   • DILATATION AND CURETTAGE  1998   • DILATATION AND CURETTAGE  1991    Vaginal warts   • HYSTERECTOMY  08/2008    UTEREUS   • MASTECTOMY W/ SENTINEL NODE BIOPSY Left 08/15/2019    Procedure: LEFt total mastectomy, LEFT axilla sentinel node biopsy,  immediate recosntruction;  Surgeon: Sarah Barker MD;  Location: [facility] OR;  Service: General   • VAGINAL HYSTERECTOMY  2021    FALLOPIAN TUBES & OVARIES   • WISDOM TOOTH EXTRACTION      ONC HISTTORY  patient is a 49-year-old -American female in excellent health on no chronic medications who was noted on her routine mammogram after  3-year break to have an abnormality in the left breast.  The patient thought she had some tenderness and showed this to her family doctor who sent her for a mammogram and this did show a definite difference from 3 years previously in that a12 mm abnormality was noted in her left breast at the 12:30 position.  Diagnostic mammogram showed in addition  that a to the lesion at 1230 there was another abnormality measuring 7 mm at the 1:00 region and several groups of indistinct calcifications were also noted in this area.  The ultrasound confirmed a solid mass measuring 11 x 10 at 1230 and a 4 x 4 millimeter mass at 4:00 in addition to 2 other cysts biopsy was done.on 2019 with the lesion at 12:00 showing atypical ductal hyperplasia measuring 1 mm negative for invasive cancer in the lesion at 1230 showed invasive mammary carcinoma no special type low-grade measuring 9 mm associated with low-grade DCIS and microcalcifications tumor was 90% ER +94% VT positive HER-2 negative low Ki-67 of 5.7%  Patient underwent bilateral breast MRI on 2019 and this showed the biopsy-proven mass at the 12 o'clock position measuring 1.5 cm residual mass in the 3 o'clock position marker from the ADH showed no suspicious residual enhancement.  There is no obvious adenopathy in the right breast was benign    Because of the multifocal nature of the disease the patient opted for a left mastectomy with immediate reconstruction and the final pathology specimen showed a 12 x 12 mm low-grade 1 invasive ductal carcinoma with 2-negative sentinel nodes.  There was associated DCIS measuring 8 x 6 mm and margins were all clear    Patient is  4 para 2 1 miscarriage and one  first childbirth was at age 23 she did not breast-feed she was on birth control for 1 year and has not had appeared since  when she had a hysterectomy for adenomyosis.  She is probably premenopausal as she has had no symptoms of menopause    Family history is  completely negative for breast cancer ovarian cancer or any other malignancy that she is aware of father  at 60 of a heart attack mother 73 she has one brother who is healthy.    She is here today to discuss adjuvant treatment her Oncotype score came back at 6 Which I told her was excellent Which predicts no benefit from chemotherapy and distant recurrence at 9 years of 3% with hormonal blockade  We discussed tamoxifen as she is likely premenopausal and the side effect profile  The side effects and toxicities of Tamoxifen were discussed with the patient, including hot flashes, mood swings,depression, DVT and endometrial cancer. A list of drugs that interfere with the efficacy of tamoxifen and are to be avoided were given to the patient.  Estradiol FSH and LH levels have been drawn today to ensure that she is premenopausal to confirm she is premenopausal  Discussed in detail the side effects of tamoxifen and the information from the Oncotype DX score and she is very happy to get away without chemotherapy and agreeable to tamoxifen but she will call me next week to check her menopausal status before taking it.  No radiation is planned       She has been on tamoxifen for about 3 months now and after the first month developed dizziness and somnolence and we asked her to cut back to 10 mg for a couple of weeks but she misunderstood and has stayed on 10 mg and is tolerating this fairly well overall.    When she was seen by Dr. Gooden and 2019 she increased the tamoxifen back up to 20 mg and has been tolerating that well since that time.  She has noticed some increased tearfulness.  She does have upcoming nipple reconstruction and follows up with Dr. Levine tomorrow in preparation for this.  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        Current Outpatient Medications on File Prior to Visit   Medication Sig Dispense Refill   • acetaminophen (TYLENOL) 500 MG tablet Take 2 tablets by mouth Every 6 (Six) Hours As Needed for Mild Pain.     • anastrozole (ARIMIDEX) 1 MG tablet TAKE 1 TABLET BY MOUTH EVERY DAY 30 tablet 6   • Multiple Vitamin (MULTIVITAMIN) tablet Take 1 tablet by mouth Daily.     • Nutritional Supplements (VITAMIN D MAINTENANCE PO) Take 1 tablet by mouth Daily.     • Wal-itin D 24 Hour  MG per 24 hr tablet TAKE 1 TABLET BY MOUTH EVERY DAY 30 tablet 11     No current facility-administered medications on file prior to visit.        ALLERGIES:    Allergies   Allergen Reactions   • Codeine Rash   • Percocet [Oxycodone-Acetaminophen] Itching        Social History     Socioeconomic History   • Marital status:    • Years of education: College   Tobacco Use   • Smoking status: Never   • Smokeless tobacco: Never   Vaping Use   • Vaping Use: Never used   Substance and Sexual Activity   • Alcohol use: Not Currently     Comment: 3 PER MONTH-SOCIALLY   • Drug use: No   • Sexual activity: Yes     Birth control/protection: Surgical        Family History   Problem Relation Age of Onset   • Diabetes Mother    • Heart disease Mother    • Hypertension Mother    • Hyperlipidemia Mother    • Cancer Maternal Aunt         OF UNKNOWN ORGIN   • Diabetes Maternal Aunt    • Hyperlipidemia Maternal Aunt    • Ovarian cancer Paternal Aunt    • Lung cancer Maternal Grandmother    • Brain cancer Maternal Grandmother    • Hypertension Father    • Heart attack Father    • Seizures Brother    • Malig Hyperthermia Neg Hx         Review of Systems   Constitutional: Negative.  Negative for appetite change, chills, diaphoresis, fatigue, fever and unexpected weight change.   HENT: Negative for hearing loss, sore throat and trouble swallowing.    Respiratory: Negative for cough, chest tightness, shortness of breath and wheezing.    Cardiovascular:  "Negative for chest pain (nerve like pain under left axilla intermittently), palpitations and leg swelling.   Gastrointestinal: Negative for abdominal distention, abdominal pain, diarrhea, nausea and vomiting.   Genitourinary: Negative for dysuria, frequency, hematuria and urgency.   Musculoskeletal: Negative for joint swelling.        No muscle weakness.   Skin: Negative for rash and wound.   Neurological: Negative for seizures, syncope, speech difficulty, weakness, numbness and headaches.   Hematological: Negative for adenopathy. Does not bruise/bleed easily.   Psychiatric/Behavioral: Negative for behavioral problems, confusion and suicidal ideas. The patient is not nervous/anxious.    All other systems reviewed and are negative.   I have reviewed and confirmed the accuracy of the ROS as documented by the MA/LPN/RN Tanner Gooden MD        Objective     Vitals:    03/15/23 1039   BP: 118/81   Pulse: 80   Resp: 16   Temp: 97.7 °F (36.5 °C)   TempSrc: Temporal   SpO2: 100%   Weight: 77.8 kg (171 lb 9.6 oz)   Height: 174 cm (68.5\")   PainSc: 0-No pain     Current Status 3/15/2023   ECOG score 0       Physical Exam   Pulmonary/Chest:          GENERAL:  Well-developed, well-nourished in no acute distress.   SKIN:  Warm, dry without rashes, purpura or petechiae.  EYES:  Pupils equal, round and reactive to light.  EOMs intact.  Conjunctivae normal.  EARS:  Hearing intact.  NOSE:  Septum midline.  No excoriations or nasal discharge.  MOUTH:  Tongue is well-papillated; no stomatitis or ulcers.  Lips normal.  THROAT:  Oropharynx without lesions or exudates.  NECK:  Supple with good range of motion; no thyromegaly or masses, no JVD.  LYMPHATICS:  No cervical, supraclavicular, axillary adenopathy.  CHEST:  Lungs clear to auscultation. Good airflow.  BREASTS: Right breast  ? Fat necrosis 7 oclock left implant in place with incisions healed.  CARDIAC:  Regular rate and rhythm without murmurs, rubs or gallops. Normal " S1,S2.  ABDOMEN:  Soft,  with no hepatosplenomegaly or masses.  EXTREMITIES:  No clubbing, cyanosis or edema.  NEUROLOGICAL:  Cranial Nerves II-XII grossly intact.  No focal neurological deficits.  PSYCHIATRIC:  Normal affect and mood.      I have reexamined the patient and the results are consistent with the previously documented exam. Tanner Gooden MD       RECENT LABS:  Hematology WBC   Date Value Ref Range Status   03/15/2023 5.46 3.40 - 10.80 10*3/mm3 Final     RBC   Date Value Ref Range Status   03/15/2023 4.65 3.77 - 5.28 10*6/mm3 Final     Hemoglobin   Date Value Ref Range Status   03/15/2023 13.8 12.0 - 15.9 g/dL Final     Hematocrit   Date Value Ref Range Status   03/15/2023 41.7 34.0 - 46.6 % Final     Platelets   Date Value Ref Range Status   03/15/2023 235 140 - 450 10*3/mm3 Final            Pathology          Study Result     BILATERAL BREAST MRI WITHOUT AND WITH CONTRAST       IMPRESSION:  1. Biopsy-proven malignancy in the left breast at the 12 o'clock  position with the more posterior of the barrel-shaped metallic clip seen  centrally within the residual mass which measures on the order of 1.5 cm  in greatest dimension. The more anterior barrel-shaped metallic clips is  located within the hematoma. No other suspicious findings are seen  within the left breast and there is no evidence for left axillary  adenopathy.  2. The top hat shaped metallic clip at the 3 o'clock position  corresponds to the site of ADH and is not associated with any residual  suspicious enhancement. The metallic clip is seen within a dominant  hematoma cavity that measures on the order of 4 cm in greatest  dimension.  3. There are no findings suspicious for malignancy in the right breast.     BI-RADS Category 6: Known malignancy.     This report was finalized on 7/8/2019 11:51 AM by Dr. Harjinder Encinas M.D.               Tissue Pathology Exam: [id number]     Final Diagnosis   1. Right Breast, Reduction Mammoplasty (78.5  grams):               A. Benign skin and breast tissue.               B. No atypical hyperplasia, in situ nor invasive carcinoma identified.     swm/pkm    Electronically signed by Sujey Baig MD on 12/20/2019 at 1504     Final Diagnosis   1. Left and Right Fallopian Tubes and Ovaries, Bilateral Salpingo-oophorectomy:           Benign ovaries and fallopian tubes with                A. Benign ovaries with                            1. Hemorrhagic cysts.                            2. Cortical inclusion cysts.  3. Adhesion to the fallopian tube by one of the ovaries.  4. Corporal albicantia.  5. Hemorrhagic corpus luteal cyst.   B. Benign fallopian tubes with complete cross sections of both fallopian tubes with  1. Paratubal cysts in one fallopian tube.  2. An area of endosalpingiosis in one of the fallopian tubes.      2. Fibroid:               A. Leiomyoma.          Assessment & Plan   1.  T1cN0 low-grade ER OR positive HER-2 negative infiltrating ductal carcinoma left breast with associated DCIS multifocal post mastectomy and sentinel node biopsy-Oncotype score of 6  · Tamoxifen prescribed in 9/19-patient cut to 10 mg after 1 month and stayed on this dose till 12/19 when escalated to 20 mg  · Patient continues the tamoxifen 20 mg daily.  · Post bilateral salpingo-oophorectomy in 8/21 switch to Arimidex  · Tolerating Arimidex 3/23    2.   negative 9 gene invitae panel testing    3.  Increased tearfulness.  Improved    4.?  Blood in stool - colonoscopy neg 3/21    5.  Bone density dated 8/22 shows significant osteopenia despite 2 years of tamoxifen calcium vitamin D and exercise we will add Prolia    Plan  1.  Continue Arimidex 1 mg daily   2.   Prolia 60 mg #2 today  3.Follow-up with me  in 6 months.  For Prolia  4.  Mammogram due again in 6/23 we will add ultrasound because of possible fat necrosis on the right        She tells me her insurance company does not pay for nurse practitioners and therefore we  will have to make sure that she is not scheduled with 1 of them

## 2023-03-31 ENCOUNTER — OFFICE VISIT (OUTPATIENT)
Dept: FAMILY MEDICINE CLINIC | Facility: CLINIC | Age: 53
End: 2023-03-31
Payer: COMMERCIAL

## 2023-03-31 VITALS
SYSTOLIC BLOOD PRESSURE: 100 MMHG | WEIGHT: 172 LBS | HEIGHT: 69 IN | HEART RATE: 93 BPM | BODY MASS INDEX: 25.48 KG/M2 | OXYGEN SATURATION: 99 % | DIASTOLIC BLOOD PRESSURE: 70 MMHG | TEMPERATURE: 98.6 F

## 2023-03-31 DIAGNOSIS — Z00.00 HEALTHCARE MAINTENANCE: ICD-10-CM

## 2023-03-31 DIAGNOSIS — Z13.6 SCREENING FOR CARDIOVASCULAR CONDITION: ICD-10-CM

## 2023-03-31 DIAGNOSIS — Z23 IMMUNIZATION DUE: Primary | ICD-10-CM

## 2023-03-31 NOTE — PROGRESS NOTES
Екатерина Strauss is here today for an annual physical exam.     Patient has a known history of breast cancer and has had a single mastectomy and follows with oncology for mamm.  Is on arimidex only now.     Eating a healthy diet. Exercising routinely.   Postmenopausal. wears seat belt. Feels safe at home.   Sexual activity: no  Birth control: Has had a hysterectomy excluding cervix and ovaries per pt for noncancerous reasons  Pregnancy:   (1  and 1 miscarriage)  Sexual and gender orientation: heterosexual female    PHQ-2 Depression Screening  Little interest or pleasure in doing things?  0   Feeling down, depressed, or hopeless?  0   PHQ-2 Total Score  0         I have reviewed the patient's medical, family, and social history in detail and updated the computerized patient record.    Screening history:  Colonoscopy - utd  Mammogram- has mamm scheduled with oncology soon   Pap/pelvic -has had a hysterectomy, non-cancerous reasons, but still has a cervix. Follows with gyn and pap   Metabolic -due    Health Maintenance   Topic Date Due   • COVID-19 Vaccine (4 - Booster for Pfizer series) 2022   • ANNUAL PHYSICAL  2023   • MAMMOGRAM  2024   • COLORECTAL CANCER SCREENING  03/15/2031   • TDAP/TD VACCINES (3 - Td or Tdap) 2032   • HEPATITIS C SCREENING  Completed   • ZOSTER VACCINE  Completed   • Pneumococcal Vaccine 0-64  Aged Out   • INFLUENZA VACCINE  Discontinued   • PAP SMEAR  Discontinued       Review of Systems   Constitutional: Negative for fever.   HENT: Negative for hearing loss.    Eyes: Negative for visual disturbance.   Respiratory: Negative for shortness of breath.    Cardiovascular: Negative for chest pain.   Gastrointestinal: Negative for constipation and diarrhea.   Genitourinary: Negative for difficulty urinating.   Musculoskeletal: Negative for arthralgias and myalgias.   Skin: Negative for rash.   Hematological: Does not bruise/bleed easily.  "  Psychiatric/Behavioral: Negative for dysphoric mood.       /70 (BP Location: Right arm, Patient Position: Sitting)   Pulse 93   Temp 98.6 °F (37 °C)   Ht 174 cm (68.5\")   Wt 78 kg (172 lb)   LMP  (LMP Unknown)   SpO2 99%   BMI 25.77 kg/m²      Physical Exam    Vital signs reviewed.  General appearance: No acute distress  Eyes: conjunctiva clear without erythema; pupils equally round and reactive  ENT: external ears and nose normal; hearing normal, oropharynx clear  Neck: supple; no thyromegaly  CV: normal rate and rhythm; no peripheral edema  Respiratory: normal respiratory effort; lungs clear to auscultation bilaterally  MSK: normal gait and station; no focal joint deformity or swelling  Skin: no rash or wounds; normal turgor  Neuro: cranial nerves 2-12 grossly intact; normal sensation to light touch  Psych: mood and affect normal; recent and remote memory intact    No visits with results within 2 Week(s) from this visit.   Latest known visit with results is:   Lab on 03/15/2023   Component Date Value Ref Range Status   • Glucose 03/15/2023 91  74 - 124 mg/dL Final   • BUN 03/15/2023 16  6 - 20 mg/dL Final   • Creatinine 03/15/2023 0.88  0.60 - 1.10 mg/dL Final   • Sodium 03/15/2023 142  134 - 145 mmol/L Final   • Potassium 03/15/2023 4.1  3.5 - 4.7 mmol/L Final   • Chloride 03/15/2023 105  98 - 107 mmol/L Final   • CO2 03/15/2023 25.6  22.0 - 29.0 mmol/L Final   • Calcium 03/15/2023 9.4  8.5 - 10.2 mg/dL Final   • Total Protein 03/15/2023 7.4  6.3 - 8.0 g/dL Final   • Albumin 03/15/2023 4.2  3.5 - 5.2 g/dL Final   • ALT (SGPT) 03/15/2023 10  0 - 33 U/L Final   • AST (SGOT) 03/15/2023 14  0 - 32 U/L Final   • Alkaline Phosphatase 03/15/2023 73  38 - 116 U/L Final   • Total Bilirubin 03/15/2023 0.4  0.2 - 1.2 mg/dL Final   • Globulin 03/15/2023 3.2  1.8 - 3.5 gm/dL Final   • A/G Ratio 03/15/2023 1.3  1.1 - 2.4 g/dL Final   • BUN/Creatinine Ratio 03/15/2023 18.2  7.3 - 30.0 Final   • Anion Gap " 03/15/2023 11.4  5.0 - 15.0 mmol/L Final   • eGFR 03/15/2023 79.2  >60.0 mL/min/1.73 Final   • Magnesium 03/15/2023 1.8  1.8 - 2.5 mg/dL Final   • Phosphorus 03/15/2023 3.7  2.5 - 4.5 mg/dL Final   • WBC 03/15/2023 5.46  3.40 - 10.80 10*3/mm3 Final   • RBC 03/15/2023 4.65  3.77 - 5.28 10*6/mm3 Final   • Hemoglobin 03/15/2023 13.8  12.0 - 15.9 g/dL Final   • Hematocrit 03/15/2023 41.7  34.0 - 46.6 % Final   • MCV 03/15/2023 89.7  79.0 - 97.0 fL Final   • MCH 03/15/2023 29.7  26.6 - 33.0 pg Final   • MCHC 03/15/2023 33.1  31.5 - 35.7 g/dL Final   • RDW 03/15/2023 13.1  12.3 - 15.4 % Final   • RDW-SD 03/15/2023 42.9  37.0 - 54.0 fl Final   • MPV 03/15/2023 10.9  6.0 - 12.0 fL Final   • Platelets 03/15/2023 235  140 - 450 10*3/mm3 Final   • Neutrophil % 03/15/2023 45.1  42.7 - 76.0 % Final   • Lymphocyte % 03/15/2023 46.3 (H)  19.6 - 45.3 % Final   • Monocyte % 03/15/2023 6.2  5.0 - 12.0 % Final   • Eosinophil % 03/15/2023 1.1  0.3 - 6.2 % Final   • Basophil % 03/15/2023 0.9  0.0 - 1.5 % Final   • Immature Grans % 03/15/2023 0.4  0.0 - 0.5 % Final   • Neutrophils, Absolute 03/15/2023 2.46  1.70 - 7.00 10*3/mm3 Final   • Lymphocytes, Absolute 03/15/2023 2.53  0.70 - 3.10 10*3/mm3 Final   • Monocytes, Absolute 03/15/2023 0.34  0.10 - 0.90 10*3/mm3 Final   • Eosinophils, Absolute 03/15/2023 0.06  0.00 - 0.40 10*3/mm3 Final   • Basophils, Absolute 03/15/2023 0.05  0.00 - 0.20 10*3/mm3 Final   • Immature Grans, Absolute 03/15/2023 0.02  0.00 - 0.05 10*3/mm3 Final   • nRBC 03/15/2023 0.0  0.0 - 0.2 /100 WBC Final         Current Outpatient Medications:   •  acetaminophen (TYLENOL) 500 MG tablet, Take 2 tablets by mouth Every 6 (Six) Hours As Needed for Mild Pain., Disp: , Rfl:   •  anastrozole (ARIMIDEX) 1 MG tablet, TAKE 1 TABLET BY MOUTH EVERY DAY, Disp: 30 tablet, Rfl: 6  •  Multiple Vitamin (MULTIVITAMIN) tablet, Take 1 tablet by mouth Daily., Disp: , Rfl:   •  Nutritional Supplements (VITAMIN D MAINTENANCE PO), Take 1  tablet by mouth Daily., Disp: , Rfl:   •  Wal-itin D 24 Hour  MG per 24 hr tablet, TAKE 1 TABLET BY MOUTH EVERY DAY, Disp: 30 tablet, Rfl: 11    Diagnoses and all orders for this visit:    1. Immunization due (Primary)  -     COVID-19 Bivalent Booster (Pfizer) 12+yrs    2. Healthcare maintenance    3. Screening for cardiovascular condition  -     Cancel: Comprehensive Metabolic Panel  -     Lipid Panel  -     Cancel: CBC & Differential        Encourage healthy diet and exercise.  Encourage patient to stay up to date on screening examinations as indicated based on age and risk factors. F/U yearly.

## 2023-04-01 LAB
CHOLEST SERPL-MCNC: 198 MG/DL (ref 0–200)
HDLC SERPL-MCNC: 88 MG/DL (ref 40–60)
LDLC SERPL CALC-MCNC: 100 MG/DL (ref 0–100)
TRIGL SERPL-MCNC: 52 MG/DL (ref 0–150)
VLDLC SERPL CALC-MCNC: 10 MG/DL (ref 5–40)

## 2023-06-15 DIAGNOSIS — N63.10 MASS OF RIGHT BREAST, UNSPECIFIED QUADRANT: Primary | ICD-10-CM

## 2023-06-15 DIAGNOSIS — R92.8 ABNORMAL FINDING ON BREAST IMAGING: ICD-10-CM

## 2023-06-16 ENCOUNTER — TELEPHONE (OUTPATIENT)
Dept: ONCOLOGY | Facility: CLINIC | Age: 53
End: 2023-06-16
Payer: COMMERCIAL

## 2023-06-16 NOTE — TELEPHONE ENCOUNTER
HUB MAY READ: Called pt to review her mammogram results. Left her my direct number to return my call.

## 2023-09-06 NOTE — PROGRESS NOTES
Subjective     REASON FOR CONSULTATION:   1.Left breast cancer pT1cN 0- ER SC positive HER-2 negative low-grade infiltrating ductal carcinoma Oncotype score of 6 postmastectomy and sentinel node biopsy on 8/20/19  2.  Oncotype score of 6 tamoxifen prescribed                               REQUESTING PHYSICIAN:  MD Dannielle Campa MD    History of Present Illness patient is a 51-year-old premenopausal woman with a small hormone positive breast cancer which is low-grade with a low Oncotype score.  Of 6    She was on tamoxifenfor 24  months and was tolerating it well but then was hospitalized at [facility] in April with a ruptured cyst in her left ovary the cause significant pain and then has developed a fairly significant cyst on her right ovary.  Her gynecologist is given her 2 options-either have her ovaries removed or switch to another medication and she opted for bilateral oophorectomy  She had her bilateral salpingo-oophorectomy-the path reports that showed no malignancy or atypia.  Fibroid was removed and I am not sure how this showed up unless the fibroid was on the ovary because she has had a hysterectomy in the past    mammogram on the right was done in June 22 and benign she never had a bone density so baseline was done last month and show significant osteopenia in the spine and hips despite being on tamoxifen for 2 years and exercising.  She has a family history of osteoporosis in her mother-we will add Prolia to her calcium and vitamin D    she never had a colonoscopy and she finally had a done in March 2021 and it was benign    She is having a lot of hot flashes with the Arimidex but does not want any medication for it.        Past Medical History:   Diagnosis Date   • Breast cancer (HCC) 05/2019    LEFT BREAST   • Fibrocystic breast    • History of palpitations    • Ovarian cyst rupture 04/2021   • PONV (postoperative nausea and  vomiting)     GAVE TOO MUCH AFTER HYSTERECTOMY   • Seasonal allergies         Past Surgical History:   Procedure Laterality Date   • BILATERAL BREAST REDUCTION Right 12/19/2019    Procedure: RIGHT REDUCTION FOR SYMMETRY;  Surgeon: PATRICIO Levine MD;  Location: [facility] OR;  Service: Plastics   • BREAST BIOPSY     • BREAST RECONSTRUCTION Left 8/15/2019    Procedure: LEFT PLACEMENT TISSUE EXPANDER WITH ALLORDERM;  Surgeon: PATRICIO Levine MD;  Location: [facility] MAIN OR;  Service: Plastics   • BREAST TISSUE EXPANDER REMOVAL INSERTION OF IMPLANT Bilateral 12/19/2019    Procedure: REMOVAL LEFT TISSUE EXPANDER AND PLACEMENT OF IMPLANT;  Surgeon: PATRICIO Levine MD;  Location: [facility] OR;  Service: Plastics   • BUNIONECTOMY Bilateral     BUNIONECTOMY-2005 AND 2015   • COLONOSCOPY N/A 3/15/2021    Procedure: COLONOSCOPY TO CECUM AND TI;  Surgeon: Heather Montero MD;  Location: [facility] ENDOSCOPY;  Service: Gastroenterology;  Laterality: N/A;  SCREENING  POST- HEMORRHOIDS   • DIAGNOSTIC LAPAROSCOPY Bilateral 8/11/2021    Procedure: DIAGNOSTIC LAPAROSCOPY, SALPINGO OOPHORECTOMY LAPAROSCOPIC;  Surgeon: Gertrude Humphries MD;  Location: [facility] OR OSC;  Service: Obstetrics/Gynecology;  Laterality: Bilateral;   • DILATATION AND CURETTAGE  1998   • DILATATION AND CURETTAGE  1991    Vaginal warts   • HYSTERECTOMY  08/2008   • MASTECTOMY W/ SENTINEL NODE BIOPSY Left 8/15/2019    Procedure: LEFt total mastectomy, LEFT axilla sentinel node biopsy,  immediate recosntruction;  Surgeon: Sarah Barker MD;  Location: [facility] OR;  Service: General   • VAGINAL HYSTERECTOMY     • WISDOM TOOTH EXTRACTION      ONC HISTTORY  patient is a 49-year-old -American female in excellent health on no chronic medications who was noted on her routine mammogram after 3-year break to have an abnormality in the left breast.  The patient thought she had some tenderness and showed this to her family doctor who sent her for a mammogram  and this did show a definite difference from 3 years previously in that a12 mm abnormality was noted in her left breast at the 12:30 position.  Diagnostic mammogram showed in addition  that a to the lesion at 1230 there was another abnormality measuring 7 mm at the 1:00 region and several groups of indistinct calcifications were also noted in this area.  The ultrasound confirmed a solid mass measuring 11 x 10 at 1230 and a 4 x 4 millimeter mass at 4:00 in addition to 2 other cysts biopsy was done.on 2019 with the lesion at 12:00 showing atypical ductal hyperplasia measuring 1 mm negative for invasive cancer in the lesion at 1230 showed invasive mammary carcinoma no special type low-grade measuring 9 mm associated with low-grade DCIS and microcalcifications tumor was 90% ER +94% NC positive HER-2 negative low Ki-67 of 5.7%  Patient underwent bilateral breast MRI on 2019 and this showed the biopsy-proven mass at the 12 o'clock position measuring 1.5 cm residual mass in the 3 o'clock position marker from the ADH showed no suspicious residual enhancement.  There is no obvious adenopathy in the right breast was benign    Because of the multifocal nature of the disease the patient opted for a left mastectomy with immediate reconstruction and the final pathology specimen showed a 12 x 12 mm low-grade 1 invasive ductal carcinoma with 2-negative sentinel nodes.  There was associated DCIS measuring 8 x 6 mm and margins were all clear    Patient is  4 para 2 1 miscarriage and one  first childbirth was at age 23 she did not breast-feed she was on birth control for 1 year and has not had appeared since  when she had a hysterectomy for adenomyosis.  She is probably premenopausal as she has had no symptoms of menopause    Family history is completely negative for breast cancer ovarian cancer or any other malignancy that she is aware of father  at 60 of a heart attack mother 73 she has one brother  who is healthy.    She is here today to discuss adjuvant treatment her Oncotype score came back at 6 Which I told her was excellent Which predicts no benefit from chemotherapy and distant recurrence at 9 years of 3% with hormonal blockade  We discussed tamoxifen as she is likely premenopausal and the side effect profile  The side effects and toxicities of Tamoxifen were discussed with the patient, including hot flashes, mood swings,depression, DVT and endometrial cancer. A list of drugs that interfere with the efficacy of tamoxifen and are to be avoided were given to the patient.  Estradiol FSH and LH levels have been drawn today to ensure that she is premenopausal to confirm she is premenopausal  Discussed in detail the side effects of tamoxifen and the information from the Oncotype DX score and she is very happy to get away without chemotherapy and agreeable to tamoxifen but she will call me next week to check her menopausal status before taking it.  No radiation is planned    12/19   She has been on tamoxifen for about 3 months now and after the first month developed dizziness and somnolence and we asked her to cut back to 10 mg for a couple of weeks but she misunderstood and has stayed on 10 mg and is tolerating this fairly well overall.    When she was seen by Dr. Gooden and December 2019 she increased the tamoxifen back up to 20 mg and has been tolerating that well since that time.  She has noticed some increased tearfulness.  She does have upcoming nipple reconstruction and follows up with Dr. Levine tomorrow in preparation for this.  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        Current Outpatient Medications on File Prior to Visit   Medication Sig Dispense Refill   • anastrozole (ARIMIDEX) 1 MG tablet TAKE 1 TABLET BY MOUTH EVERY  DAY 30 tablet 6   • loratadine-pseudoephedrine ([employer] Allergy Relief-D)  MG per 24 hr tablet Take 1 tablet by mouth Daily. 90 tablet 3   • Multiple Vitamin (MULTIVITAMIN) tablet Take 1 tablet by mouth Daily.     • Nutritional Supplements (VITAMIN D MAINTENANCE PO) Take 1 tablet by mouth Daily.       No current facility-administered medications on file prior to visit.        ALLERGIES:    Allergies   Allergen Reactions   • Codeine Rash   • Percocet [Oxycodone-Acetaminophen] Itching        Social History     Socioeconomic History   • Marital status:    • Years of education: College   Tobacco Use   • Smoking status: Never Smoker   • Smokeless tobacco: Never Used   Substance and Sexual Activity   • Alcohol use: Yes     Comment: 3 PER MONTH-SOCIALLY   • Drug use: No   • Sexual activity: Yes     Birth control/protection: Surgical        Family History   Problem Relation Age of Onset   • Diabetes Mother    • Heart disease Mother    • Hypertension Mother    • Hyperlipidemia Mother    • Cancer Maternal Aunt         OF UNKNOWN ORGIN   • Diabetes Maternal Aunt    • Hyperlipidemia Maternal Aunt    • Ovarian cancer Paternal Aunt    • Lung cancer Maternal Grandmother    • Brain cancer Maternal Grandmother    • Hypertension Father    • Heart attack Father    • Seizures Brother    • Malig Hyperthermia Neg Hx         Review of Systems   Constitutional: Negative.  Negative for appetite change, chills, diaphoresis, fatigue, fever and unexpected weight change.   HENT: Negative for hearing loss, sore throat and trouble swallowing.    Respiratory: Negative for cough, chest tightness, shortness of breath and wheezing.    Cardiovascular: Negative for chest pain (nerve like pain under left axilla intermittently), palpitations and leg swelling.   Gastrointestinal: Negative for abdominal distention, abdominal pain, diarrhea, nausea and vomiting.   Genitourinary: Negative for dysuria, frequency, hematuria and urgency.  "  Musculoskeletal: Negative for joint swelling.        No muscle weakness.   Skin: Negative for rash and wound.   Neurological: Negative for seizures, syncope, speech difficulty, weakness, numbness and headaches.   Hematological: Negative for adenopathy. Does not bruise/bleed easily.   Psychiatric/Behavioral: Negative for behavioral problems, confusion and suicidal ideas. The patient is not nervous/anxious.    All other systems reviewed and are negative.   I have reviewed and confirmed the accuracy of the ROS as documented by the MA/LPN/NELLY Gooden MD        Objective     Vitals:    08/17/22 1157   BP: 121/83   Pulse: 86   Resp: 18   Temp: 97.8 °F (36.6 °C)   TempSrc: Temporal   SpO2: 99%   Weight: 80.3 kg (177 lb)   Height: 175.3 cm (69.02\")   PainSc: 0-No pain     Current Status 1/28/2021   ECOG score 0       Physical Exam   GENERAL:  Well-developed, well-nourished in no acute distress.   SKIN:  Warm, dry without rashes, purpura or petechiae.  EYES:  Pupils equal, round and reactive to light.  EOMs intact.  Conjunctivae normal.  EARS:  Hearing intact.  NOSE:  Septum midline.  No excoriations or nasal discharge.  MOUTH:  Tongue is well-papillated; no stomatitis or ulcers.  Lips normal.  THROAT:  Oropharynx without lesions or exudates.  NECK:  Supple with good range of motion; no thyromegaly or masses, no JVD.  LYMPHATICS:  No cervical, supraclavicular, axillary adenopathy.  CHEST:  Lungs clear to auscultation. Good airflow.  BREASTS: Right breast is benign left implant in place with incisions healed.  CARDIAC:  Regular rate and rhythm without murmurs, rubs or gallops. Normal S1,S2.  ABDOMEN:  Soft,  with no hepatosplenomegaly or masses.  EXTREMITIES:  No clubbing, cyanosis or edema.  NEUROLOGICAL:  Cranial Nerves II-XII grossly intact.  No focal neurological deficits.  PSYCHIATRIC:  Normal affect and mood.      I have reexamined the patient and the results are consistent with the previously documented " exam. Tanner Gooden MD       RECENT LABS:  Hematology WBC   Date Value Ref Range Status   08/17/2022 5.98 3.40 - 10.80 10*3/mm3 Final     RBC   Date Value Ref Range Status   08/17/2022 5.00 3.77 - 5.28 10*6/mm3 Final     Hemoglobin   Date Value Ref Range Status   08/17/2022 14.4 12.0 - 15.9 g/dL Final     Hematocrit   Date Value Ref Range Status   08/17/2022 45.9 34.0 - 46.6 % Final     Platelets   Date Value Ref Range Status   08/17/2022 251 140 - 450 10*3/mm3 Final            Pathology          Study Result     BILATERAL BREAST MRI WITHOUT AND WITH CONTRAST       IMPRESSION:  1. Biopsy-proven malignancy in the left breast at the 12 o'clock  position with the more posterior of the barrel-shaped metallic clip seen  centrally within the residual mass which measures on the order of 1.5 cm  in greatest dimension. The more anterior barrel-shaped metallic clips is  located within the hematoma. No other suspicious findings are seen  within the left breast and there is no evidence for left axillary  adenopathy.  2. The top hat shaped metallic clip at the 3 o'clock position  corresponds to the site of ADH and is not associated with any residual  suspicious enhancement. The metallic clip is seen within a dominant  hematoma cavity that measures on the order of 4 cm in greatest  dimension.  3. There are no findings suspicious for malignancy in the right breast.     BI-RADS Category 6: Known malignancy.     This report was finalized on 7/8/2019 11:51 AM by Dr. Harjinder Encinas M.D.               Tissue Pathology Exam: [id number]     Final Diagnosis   1. Right Breast, Reduction Mammoplasty (78.5 grams):               A. Benign skin and breast tissue.               B. No atypical hyperplasia, in situ nor invasive carcinoma identified.     swm/pkm    Electronically signed by Sujey Baig MD on 12/20/2019 at 1504     Final Diagnosis   1. Left and Right Fallopian Tubes and Ovaries, Bilateral Salpingo-oophorectomy:            Benign ovaries and fallopian tubes with                A. Benign ovaries with                            1. Hemorrhagic cysts.                            2. Cortical inclusion cysts.  3. Adhesion to the fallopian tube by one of the ovaries.  4. Corporal albicantia.  5. Hemorrhagic corpus luteal cyst.   B. Benign fallopian tubes with complete cross sections of both fallopian tubes with  1. Paratubal cysts in one fallopian tube.  2. An area of endosalpingiosis in one of the fallopian tubes.      2. Fibroid:               A. Leiomyoma.          Assessment & Plan   1.  T1cN0 low-grade ER AZ positive HER-2 negative infiltrating ductal carcinoma left breast with associated DCIS multifocal post mastectomy and sentinel node biopsy-Oncotype score of 6  · Tamoxifen prescribed in 9/19-patient cut to 10 mg after 1 month and stayed on this dose till 12/19 when escalated to 20 mg  · Patient continues the tamoxifen 20 mg daily.  · Post bilateral salpingo-oophorectomy in 8/21 switch to Arimidex  · Tolerating Arimidex OK with hot flashes 8/22    2.   negative 9 gene invitae panel testing    3.  Increased tearfulness.  Improved    4.?  Blood in stool - colonoscopy neg 3/21    5.  Bone density dated 8/22 shows significant osteopenia despite 2 years of tamoxifen calcium vitamin D and exercise we will add Prolia    Plan  1.  Continue Arimidex 1 mg daily   2.  Return in 1 week for Prolia 60 mg  3.Follow-up with me  in 6 months.  4.  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  A telfa template was made of the defect.  This telfa template was then used to outline the paramedian forehead pedicle flap.  The donor area for the pedicle flap was then injected with anesthesia.  The flap was excised through the skin and subcutaneous tissue down to the layer of the underlying musculature.  The pedicle flap was carefully excised within this deep plane to maintain its blood supply.  The edges of the donor site were undermined.   The donor site was closed in a primary fashion.  The pedicle was then rotated into position and sutured.  Once the tube was sutured into place, adequate blood supply was confirmed with blanching and refill.  The pedicle was then wrapped with xeroform gauze and dressed appropriately with a telfa and gauze bandage to ensure continued blood supply and protect the attached pedicle.

## 2023-09-13 ENCOUNTER — INFUSION (OUTPATIENT)
Dept: ONCOLOGY | Facility: HOSPITAL | Age: 53
End: 2023-09-13
Payer: COMMERCIAL

## 2023-09-13 ENCOUNTER — OFFICE VISIT (OUTPATIENT)
Dept: ONCOLOGY | Facility: CLINIC | Age: 53
End: 2023-09-13
Payer: COMMERCIAL

## 2023-09-13 ENCOUNTER — LAB (OUTPATIENT)
Dept: LAB | Facility: HOSPITAL | Age: 53
End: 2023-09-13
Payer: COMMERCIAL

## 2023-09-13 VITALS
DIASTOLIC BLOOD PRESSURE: 80 MMHG | HEART RATE: 86 BPM | OXYGEN SATURATION: 100 % | HEIGHT: 69 IN | WEIGHT: 176.5 LBS | TEMPERATURE: 98 F | RESPIRATION RATE: 16 BRPM | BODY MASS INDEX: 26.14 KG/M2 | SYSTOLIC BLOOD PRESSURE: 119 MMHG

## 2023-09-13 DIAGNOSIS — C50.412 MALIGNANT NEOPLASM OF UPPER-OUTER QUADRANT OF LEFT BREAST IN FEMALE, ESTROGEN RECEPTOR POSITIVE: Primary | ICD-10-CM

## 2023-09-13 DIAGNOSIS — Z17.0 MALIGNANT NEOPLASM OF UPPER-OUTER QUADRANT OF LEFT BREAST IN FEMALE, ESTROGEN RECEPTOR POSITIVE: Primary | ICD-10-CM

## 2023-09-13 DIAGNOSIS — C50.412 MALIGNANT NEOPLASM OF UPPER-OUTER QUADRANT OF LEFT BREAST IN FEMALE, ESTROGEN RECEPTOR POSITIVE: ICD-10-CM

## 2023-09-13 DIAGNOSIS — Z79.811 AROMATASE INHIBITOR USE: ICD-10-CM

## 2023-09-13 DIAGNOSIS — Z79.811 AROMATASE INHIBITOR USE: Primary | ICD-10-CM

## 2023-09-13 DIAGNOSIS — Z17.0 MALIGNANT NEOPLASM OF UPPER-OUTER QUADRANT OF LEFT BREAST IN FEMALE, ESTROGEN RECEPTOR POSITIVE: ICD-10-CM

## 2023-09-13 LAB
ALBUMIN SERPL-MCNC: 4.1 G/DL (ref 3.5–5.2)
ALBUMIN/GLOB SERPL: 1.5 G/DL
ALP SERPL-CCNC: 62 U/L (ref 39–117)
ALT SERPL W P-5'-P-CCNC: 9 U/L (ref 1–33)
ANION GAP SERPL CALCULATED.3IONS-SCNC: 12.4 MMOL/L (ref 5–15)
AST SERPL-CCNC: 15 U/L (ref 1–32)
BASOPHILS # BLD AUTO: 0.04 10*3/MM3 (ref 0–0.2)
BASOPHILS NFR BLD AUTO: 0.7 % (ref 0–1.5)
BILIRUB SERPL-MCNC: 0.4 MG/DL (ref 0–1.2)
BUN SERPL-MCNC: 10 MG/DL (ref 6–20)
BUN/CREAT SERPL: 10.9 (ref 7–25)
CALCIUM SPEC-SCNC: 9 MG/DL (ref 8.6–10.5)
CHLORIDE SERPL-SCNC: 105 MMOL/L (ref 98–107)
CO2 SERPL-SCNC: 25.6 MMOL/L (ref 22–29)
CREAT SERPL-MCNC: 0.92 MG/DL (ref 0.6–1.1)
DEPRECATED RDW RBC AUTO: 45.3 FL (ref 37–54)
EGFRCR SERPLBLD CKD-EPI 2021: 74.6 ML/MIN/1.73
EOSINOPHIL # BLD AUTO: 0.07 10*3/MM3 (ref 0–0.4)
EOSINOPHIL NFR BLD AUTO: 1.2 % (ref 0.3–6.2)
ERYTHROCYTE [DISTWIDTH] IN BLOOD BY AUTOMATED COUNT: 13.2 % (ref 12.3–15.4)
GLOBULIN UR ELPH-MCNC: 2.8 GM/DL
GLUCOSE SERPL-MCNC: 63 MG/DL (ref 65–99)
HCT VFR BLD AUTO: 44.8 % (ref 34–46.6)
HGB BLD-MCNC: 14.1 G/DL (ref 12–15.9)
IMM GRANULOCYTES # BLD AUTO: 0.01 10*3/MM3 (ref 0–0.05)
IMM GRANULOCYTES NFR BLD AUTO: 0.2 % (ref 0–0.5)
LYMPHOCYTES # BLD AUTO: 2.29 10*3/MM3 (ref 0.7–3.1)
LYMPHOCYTES NFR BLD AUTO: 38.2 % (ref 19.6–45.3)
MAGNESIUM SERPL-MCNC: 2 MG/DL (ref 1.6–2.6)
MCH RBC QN AUTO: 29.4 PG (ref 26.6–33)
MCHC RBC AUTO-ENTMCNC: 31.5 G/DL (ref 31.5–35.7)
MCV RBC AUTO: 93.5 FL (ref 79–97)
MONOCYTES # BLD AUTO: 0.48 10*3/MM3 (ref 0.1–0.9)
MONOCYTES NFR BLD AUTO: 8 % (ref 5–12)
NEUTROPHILS NFR BLD AUTO: 3.11 10*3/MM3 (ref 1.7–7)
NEUTROPHILS NFR BLD AUTO: 51.7 % (ref 42.7–76)
NRBC BLD AUTO-RTO: 0 /100 WBC (ref 0–0.2)
PHOSPHATE SERPL-MCNC: 2.9 MG/DL (ref 2.5–4.5)
PLATELET # BLD AUTO: 243 10*3/MM3 (ref 140–450)
PMV BLD AUTO: 10.6 FL (ref 6–12)
POTASSIUM SERPL-SCNC: 4 MMOL/L (ref 3.5–5.2)
PROT SERPL-MCNC: 6.9 G/DL (ref 6–8.5)
RBC # BLD AUTO: 4.79 10*6/MM3 (ref 3.77–5.28)
SODIUM SERPL-SCNC: 143 MMOL/L (ref 136–145)
WBC NRBC COR # BLD: 6 10*3/MM3 (ref 3.4–10.8)

## 2023-09-13 PROCEDURE — 96372 THER/PROPH/DIAG INJ SC/IM: CPT

## 2023-09-13 PROCEDURE — 84100 ASSAY OF PHOSPHORUS: CPT

## 2023-09-13 PROCEDURE — 83735 ASSAY OF MAGNESIUM: CPT

## 2023-09-13 PROCEDURE — 36415 COLL VENOUS BLD VENIPUNCTURE: CPT

## 2023-09-13 PROCEDURE — 25010000002 DENOSUMAB 60 MG/ML SOLUTION PREFILLED SYRINGE: Performed by: INTERNAL MEDICINE

## 2023-09-13 PROCEDURE — 80053 COMPREHEN METABOLIC PANEL: CPT

## 2023-09-13 PROCEDURE — 85025 COMPLETE CBC W/AUTO DIFF WBC: CPT

## 2023-09-13 RX ADMIN — DENOSUMAB 60 MG: 60 INJECTION SUBCUTANEOUS at 10:30

## 2023-09-13 NOTE — PROGRESS NOTES
Subjective     REASON FOR CONSULTATION:   1.Left breast cancer pT1cN 0- ER VT positive HER-2 negative low-grade infiltrating ductal carcinoma Oncotype score of 6 postmastectomy and sentinel node biopsy on 8/20/19  2.  Oncotype score of 6 tamoxifen prescribed                               REQUESTING PHYSICIAN:  MD Dannielle Campa MD    History of Present Illness patient is a 51-year-old premenopausal woman with a small hormone positive breast cancer which is low-grade with a low Oncotype score.  Of 6    She was on tamoxifenfor 24  months and was tolerating it well but then was hospitalized at [facility] in April with a ruptured cyst in her left ovary the cause significant pain and then has developed a fairly significant cyst on her right ovary.  Her gynecologist is given her 2 options-either have her ovaries removed or switch to another medication and she opted for bilateral oophorectomy since then she has been on Arimidex and has now been on therapy  4  years    She had her bilateral salpingo-oophorectomy-the path reports that showed no malignancy or atypia.  Fibroid was removed and I am not sure how this showed up unless the fibroid was on the ovary because she has had a hysterectomy in the past  Her last visit I palpated a mass in her right breast and the ultrasound showed a mass which was biopsied and thankfully was just a fibroadenoma      She has a family history of osteoporosis in her mother-we  added Prolia to her calcium and vitamin D and she is here for dose #3      she never had a colonoscopy and she finally had a done in March 2021 and it was benign    She is having a lot of hot flashes with the Arimidex but does not want any medication for it.        Past Medical History:   Diagnosis Date    Breast cancer 05/2019    LEFT BREAST    Fibrocystic breast     History of palpitations     Ovarian cyst rupture 04/2021    PONV  (postoperative nausea and vomiting)     GAVE TOO MUCH AFTER HYSTERECTOMY    Seasonal allergies         Past Surgical History:   Procedure Laterality Date    BILATERAL BREAST REDUCTION Right 12/19/2019    Procedure: RIGHT REDUCTION FOR SYMMETRY;  Surgeon: PATRICIO Levine MD;  Location: [facility] MAIN OR;  Service: Plastics    BREAST BIOPSY      BREAST RECONSTRUCTION Left 08/15/2019    Procedure: LEFT PLACEMENT TISSUE EXPANDER WITH ALLORDERM;  Surgeon: PATRICIO Levine MD;  Location: [facility] MAIN OR;  Service: Plastics    BREAST TISSUE EXPANDER REMOVAL INSERTION OF IMPLANT Bilateral 12/19/2019    Procedure: REMOVAL LEFT TISSUE EXPANDER AND PLACEMENT OF IMPLANT;  Surgeon: PATRICIO Levine MD;  Location: [facility] MAIN OR;  Service: Plastics    BUNIONECTOMY Bilateral     BUNIONECTOMY-2005 AND 2015    COLONOSCOPY N/A 03/15/2021    Procedure: COLONOSCOPY TO CECUM AND TI;  Surgeon: Heather Montero MD;  Location: [facility] ENDOSCOPY;  Service: Gastroenterology;  Laterality: N/A;  SCREENING  POST- HEMORRHOIDS    DIAGNOSTIC LAPAROSCOPY Bilateral 08/11/2021    Procedure: DIAGNOSTIC LAPAROSCOPY, SALPINGO OOPHORECTOMY LAPAROSCOPIC;  Surgeon: Gertrude Humphries MD;  Location: [facility] OR OSC;  Service: Obstetrics/Gynecology;  Laterality: Bilateral;    DILATATION AND CURETTAGE  1998    DILATATION AND CURETTAGE  1991    Vaginal warts    HYSTERECTOMY  08/2008    UTEREUS    MASTECTOMY W/ SENTINEL NODE BIOPSY Left 08/15/2019    Procedure: LEFt total mastectomy, LEFT axilla sentinel node biopsy,  immediate recosntruction;  Surgeon: Sarah Barker MD;  Location: [facility] OR;  Service: General    VAGINAL HYSTERECTOMY  2021    FALLOPIAN TUBES & OVARIES    WISDOM TOOTH EXTRACTION      ONC HISTTORY  patient is a 49-year-old -American female in excellent health on no chronic medications who was noted on her routine mammogram after 3-year break to have an abnormality in the left breast.  The patient thought she had some tenderness and  showed this to her family doctor who sent her for a mammogram and this did show a definite difference from 3 years previously in that a12 mm abnormality was noted in her left breast at the 12:30 position.  Diagnostic mammogram showed in addition  that a to the lesion at 1230 there was another abnormality measuring 7 mm at the 1:00 region and several groups of indistinct calcifications were also noted in this area.  The ultrasound confirmed a solid mass measuring 11 x 10 at 1230 and a 4 x 4 millimeter mass at 4:00 in addition to 2 other cysts biopsy was done.on 2019 with the lesion at 12:00 showing atypical ductal hyperplasia measuring 1 mm negative for invasive cancer in the lesion at 1230 showed invasive mammary carcinoma no special type low-grade measuring 9 mm associated with low-grade DCIS and microcalcifications tumor was 90% ER +94% TN positive HER-2 negative low Ki-67 of 5.7%  Patient underwent bilateral breast MRI on 2019 and this showed the biopsy-proven mass at the 12 o'clock position measuring 1.5 cm residual mass in the 3 o'clock position marker from the ADH showed no suspicious residual enhancement.  There is no obvious adenopathy in the right breast was benign    Because of the multifocal nature of the disease the patient opted for a left mastectomy with immediate reconstruction and the final pathology specimen showed a 12 x 12 mm low-grade 1 invasive ductal carcinoma with 2-negative sentinel nodes.  There was associated DCIS measuring 8 x 6 mm and margins were all clear    Patient is  4 para 2 1 miscarriage and one  first childbirth was at age 23 she did not breast-feed she was on birth control for 1 year and has not had appeared since  when she had a hysterectomy for adenomyosis.  She is probably premenopausal as she has had no symptoms of menopause    Family history is completely negative for breast cancer ovarian cancer or any other malignancy that she is aware of father   at 60 of a heart attack mother 73 she has one brother who is healthy.    She is here today to discuss adjuvant treatment her Oncotype score came back at 6 Which I told her was excellent Which predicts no benefit from chemotherapy and distant recurrence at 9 years of 3% with hormonal blockade  We discussed tamoxifen as she is likely premenopausal and the side effect profile  The side effects and toxicities of Tamoxifen were discussed with the patient, including hot flashes, mood swings,depression, DVT and endometrial cancer. A list of drugs that interfere with the efficacy of tamoxifen and are to be avoided were given to the patient.  Estradiol FSH and LH levels have been drawn today to ensure that she is premenopausal to confirm she is premenopausal  Discussed in detail the side effects of tamoxifen and the information from the Oncotype DX score and she is very happy to get away without chemotherapy and agreeable to tamoxifen but she will call me next week to check her menopausal status before taking it.  No radiation is planned       She has been on tamoxifen for about 3 months now and after the first month developed dizziness and somnolence and we asked her to cut back to 10 mg for a couple of weeks but she misunderstood and has stayed on 10 mg and is tolerating this fairly well overall.    When she was seen by Dr. Gooden and 2019 she increased the tamoxifen back up to 20 mg and has been tolerating that well since that time.  She has noticed some increased tearfulness.  She does have upcoming nipple reconstruction and follows up with Dr. Levine tomorrow in preparation for this.  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        Current Outpatient Medications on File Prior to Visit   Medication Sig Dispense Refill     acetaminophen (TYLENOL) 500 MG tablet Take 2 tablets by mouth Every 6 (Six) Hours As Needed for Mild Pain.      anastrozole (ARIMIDEX) 1 MG tablet TAKE 1 TABLET BY MOUTH EVERY DAY 30 tablet 6    Multiple Vitamin (MULTIVITAMIN) tablet Take 1 tablet by mouth Daily.      Nutritional Supplements (VITAMIN D MAINTENANCE PO) Take 1 tablet by mouth Daily.      Wal-itin D 24 Hour  MG per 24 hr tablet TAKE 1 TABLET BY MOUTH EVERY DAY 30 tablet 11     No current facility-administered medications on file prior to visit.        ALLERGIES:    Allergies   Allergen Reactions    Codeine Rash    Percocet [Oxycodone-Acetaminophen] Itching        Social History     Socioeconomic History    Marital status:     Years of education: College   Tobacco Use    Smoking status: Never    Smokeless tobacco: Never   Vaping Use    Vaping Use: Never used   Substance and Sexual Activity    Alcohol use: Not Currently     Comment: 3 PER MONTH-SOCIALLY    Drug use: No    Sexual activity: Yes     Birth control/protection: Surgical        Family History   Problem Relation Age of Onset    Diabetes Mother     Heart disease Mother     Hypertension Mother     Hyperlipidemia Mother     Cancer Maternal Aunt         OF UNKNOWN ORGIN    Diabetes Maternal Aunt     Hyperlipidemia Maternal Aunt     Ovarian cancer Paternal Aunt     Lung cancer Maternal Grandmother     Brain cancer Maternal Grandmother     Hypertension Father     Heart attack Father     Seizures Brother     Malig Hyperthermia Neg Hx         Review of Systems   Constitutional: Negative.  Negative for appetite change, chills, diaphoresis, fatigue, fever and unexpected weight change.   HENT:  Negative for hearing loss, sore throat and trouble swallowing.    Respiratory:  Negative for cough, chest tightness, shortness of breath and wheezing.    Cardiovascular:  Negative for chest pain (nerve like pain under left axilla intermittently), palpitations and leg swelling.   Gastrointestinal:   "Negative for abdominal distention, abdominal pain, diarrhea, nausea and vomiting.   Genitourinary:  Negative for dysuria, frequency, hematuria and urgency.   Musculoskeletal:  Negative for joint swelling.        No muscle weakness.   Skin:  Negative for rash and wound.   Neurological:  Negative for seizures, syncope, speech difficulty, weakness, numbness and headaches.   Hematological:  Negative for adenopathy. Does not bruise/bleed easily.   Psychiatric/Behavioral:  Negative for behavioral problems, confusion and suicidal ideas. The patient is not nervous/anxious.    All other systems reviewed and are negative. I have reviewed and confirmed the accuracy of the ROS as documented by the MA/LPN/RN Lydia Paige, PCT        Objective     Vitals:    09/13/23 0931   Resp: 16   Temp: 98 °F (36.7 °C)   TempSrc: Temporal   Weight: 80.1 kg (176 lb 8 oz)   Height: 174 cm (68.5\")   PainSc: 0-No pain         9/13/2023     9:29 AM   Current Status   ECOG score 0       Physical Exam   Pulmonary/Chest:        GENERAL:  Well-developed, well-nourished in no acute distress.   SKIN:  Warm, dry without rashes, purpura or petechiae.  EYES:  Pupils equal, round and reactive to light.  EOMs intact.  Conjunctivae normal.  EARS:  Hearing intact.  NOSE:  Septum midline.  No excoriations or nasal discharge.  MOUTH:  Tongue is well-papillated; no stomatitis or ulcers.  Lips normal.  THROAT:  Oropharynx without lesions or exudates.  NECK:  Supple with good range of motion; no thyromegaly or masses, no JVD.  LYMPHATICS:  No cervical, supraclavicular, axillary adenopathy.  CHEST:  Lungs clear to auscultation. Good airflow.  BREASTS: Right breast  ? Fat necrosis 7 oclock left implant in place with incisions healed.  CARDIAC:  Regular rate and rhythm without murmurs, rubs or gallops. Normal S1,S2.  ABDOMEN:  Soft,  with no hepatosplenomegaly or masses.  EXTREMITIES:  No clubbing, cyanosis or edema.  NEUROLOGICAL:  Cranial Nerves II-XII grossly " intact.  No focal neurological deficits.  PSYCHIATRIC:  Normal affect and mood.      I have reexamined the patient and the results are consistent with the previously documented exam. Lydia Paige, JENIFER       RECENT LABS:  Hematology WBC   Date Value Ref Range Status   03/15/2023 5.46 3.40 - 10.80 10*3/mm3 Final     RBC   Date Value Ref Range Status   03/15/2023 4.65 3.77 - 5.28 10*6/mm3 Final     Hemoglobin   Date Value Ref Range Status   03/15/2023 13.8 12.0 - 15.9 g/dL Final     Hematocrit   Date Value Ref Range Status   03/15/2023 41.7 34.0 - 46.6 % Final     Platelets   Date Value Ref Range Status   03/15/2023 235 140 - 450 10*3/mm3 Final            Pathology          Study Result     BILATERAL BREAST MRI WITHOUT AND WITH CONTRAST       IMPRESSION:  1. Biopsy-proven malignancy in the left breast at the 12 o'clock  position with the more posterior of the barrel-shaped metallic clip seen  centrally within the residual mass which measures on the order of 1.5 cm  in greatest dimension. The more anterior barrel-shaped metallic clips is  located within the hematoma. No other suspicious findings are seen  within the left breast and there is no evidence for left axillary  adenopathy.  2. The top hat shaped metallic clip at the 3 o'clock position  corresponds to the site of ADH and is not associated with any residual  suspicious enhancement. The metallic clip is seen within a dominant  hematoma cavity that measures on the order of 4 cm in greatest  dimension.  3. There are no findings suspicious for malignancy in the right breast.     BI-RADS Category 6: Known malignancy.     This report was finalized on 7/8/2019 11:51 AM by Dr. Harjinder Encinas M.D.               Tissue Pathology Exam: [id number]     Final Diagnosis   1. Right Breast, Reduction Mammoplasty (78.5 grams):               A. Benign skin and breast tissue.               B. No atypical hyperplasia, in situ nor invasive carcinoma identified.     carter/roxy     Electronically signed by Sujey Baig MD on 12/20/2019 at 1504     Final Diagnosis   1. Left and Right Fallopian Tubes and Ovaries, Bilateral Salpingo-oophorectomy:           Benign ovaries and fallopian tubes with                A. Benign ovaries with                            1. Hemorrhagic cysts.                            2. Cortical inclusion cysts.  3. Adhesion to the fallopian tube by one of the ovaries.  4. Corporal albicantia.  5. Hemorrhagic corpus luteal cyst.   B. Benign fallopian tubes with complete cross sections of both fallopian tubes with  1. Paratubal cysts in one fallopian tube.  2. An area of endosalpingiosis in one of the fallopian tubes.      2. Fibroid:               A. Leiomyoma.          Assessment & Plan   1.  T1cN0 low-grade ER DE positive HER-2 negative infiltrating ductal carcinoma left breast with associated DCIS multifocal post mastectomy and sentinel node biopsy-Oncotype score of 6  Tamoxifen prescribed in 9/19-patient cut to 10 mg after 1 month and stayed on this dose till 12/19 when escalated to 20 mg  Patient continues the tamoxifen 20 mg daily.  Post bilateral salpingo-oophorectomy in 8/21 switch to Arimidex  Tolerating Arimidex 3/23    2.   negative 9 gene invitae panel testing    3.  Increased tearfulness.  Improved    4.?  Blood in stool - colonoscopy neg 3/21    5.  Bone density dated 8/22 shows significant osteopenia despite 2 years of tamoxifen calcium vitamin D and exercise we will add Prolia    Plan  1.  Continue Arimidex 1 mg daily   2.   Prolia 60 mg #3 today  3.Follow-up with me  in 6 months.  For Prolia  4.  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

## 2024-01-26 NOTE — TELEPHONE ENCOUNTER
Received notification from [employer] that additional specimens required.   I faxed this notification to [facility] pathology lab; confirmed additional specimen needed with Ivan pillai        no

## 2024-02-25 DIAGNOSIS — J30.1 SEASONAL ALLERGIC RHINITIS DUE TO POLLEN: ICD-10-CM

## 2024-02-26 RX ORDER — LORATADINE/PSEUDOEPHEDRINE 10MG-240MG
1 TABLET, EXTENDED RELEASE 24 HR ORAL DAILY
Qty: 30 TABLET | Refills: 11 | Status: SHIPPED | OUTPATIENT
Start: 2024-02-26

## 2024-03-20 ENCOUNTER — LAB (OUTPATIENT)
Dept: LAB | Facility: HOSPITAL | Age: 54
End: 2024-03-20
Payer: COMMERCIAL

## 2024-03-20 ENCOUNTER — OFFICE VISIT (OUTPATIENT)
Dept: ONCOLOGY | Facility: CLINIC | Age: 54
End: 2024-03-20
Payer: COMMERCIAL

## 2024-03-20 ENCOUNTER — INFUSION (OUTPATIENT)
Dept: ONCOLOGY | Facility: HOSPITAL | Age: 54
End: 2024-03-20
Payer: COMMERCIAL

## 2024-03-20 VITALS
HEART RATE: 86 BPM | OXYGEN SATURATION: 98 % | TEMPERATURE: 97.3 F | HEIGHT: 69 IN | SYSTOLIC BLOOD PRESSURE: 121 MMHG | BODY MASS INDEX: 27.02 KG/M2 | DIASTOLIC BLOOD PRESSURE: 72 MMHG | RESPIRATION RATE: 16 BRPM | WEIGHT: 182.4 LBS

## 2024-03-20 DIAGNOSIS — Z79.811 AROMATASE INHIBITOR USE: ICD-10-CM

## 2024-03-20 DIAGNOSIS — C50.412 MALIGNANT NEOPLASM OF UPPER-OUTER QUADRANT OF LEFT BREAST IN FEMALE, ESTROGEN RECEPTOR POSITIVE: ICD-10-CM

## 2024-03-20 DIAGNOSIS — Z79.811 AROMATASE INHIBITOR USE: Primary | ICD-10-CM

## 2024-03-20 DIAGNOSIS — C50.412 MALIGNANT NEOPLASM OF UPPER-OUTER QUADRANT OF LEFT BREAST IN FEMALE, ESTROGEN RECEPTOR POSITIVE: Primary | ICD-10-CM

## 2024-03-20 DIAGNOSIS — Z17.0 MALIGNANT NEOPLASM OF UPPER-OUTER QUADRANT OF LEFT BREAST IN FEMALE, ESTROGEN RECEPTOR POSITIVE: ICD-10-CM

## 2024-03-20 DIAGNOSIS — Z17.0 MALIGNANT NEOPLASM OF UPPER-OUTER QUADRANT OF LEFT BREAST IN FEMALE, ESTROGEN RECEPTOR POSITIVE: Primary | ICD-10-CM

## 2024-03-20 LAB
ALBUMIN SERPL-MCNC: 3.9 G/DL (ref 3.5–5.2)
ALBUMIN/GLOB SERPL: 1.1 G/DL
ALP SERPL-CCNC: 68 U/L (ref 39–117)
ALT SERPL W P-5'-P-CCNC: 6 U/L (ref 1–33)
ANION GAP SERPL CALCULATED.3IONS-SCNC: 6.3 MMOL/L (ref 5–15)
AST SERPL-CCNC: 14 U/L (ref 1–32)
BASOPHILS # BLD AUTO: 0.03 10*3/MM3 (ref 0–0.2)
BASOPHILS NFR BLD AUTO: 0.5 % (ref 0–1.5)
BILIRUB SERPL-MCNC: 0.3 MG/DL (ref 0–1.2)
BUN SERPL-MCNC: 14 MG/DL (ref 6–20)
BUN/CREAT SERPL: 16.5 (ref 7–25)
CALCIUM SPEC-SCNC: 9.4 MG/DL (ref 8.6–10.5)
CHLORIDE SERPL-SCNC: 105 MMOL/L (ref 98–107)
CO2 SERPL-SCNC: 28.7 MMOL/L (ref 22–29)
CREAT SERPL-MCNC: 0.85 MG/DL (ref 0.57–1)
DEPRECATED RDW RBC AUTO: 44.8 FL (ref 37–54)
EGFRCR SERPLBLD CKD-EPI 2021: 82 ML/MIN/1.73
EOSINOPHIL # BLD AUTO: 0.04 10*3/MM3 (ref 0–0.4)
EOSINOPHIL NFR BLD AUTO: 0.7 % (ref 0.3–6.2)
ERYTHROCYTE [DISTWIDTH] IN BLOOD BY AUTOMATED COUNT: 13.2 % (ref 12.3–15.4)
GLOBULIN UR ELPH-MCNC: 3.5 GM/DL
GLUCOSE SERPL-MCNC: 62 MG/DL (ref 65–99)
HCT VFR BLD AUTO: 44.3 % (ref 34–46.6)
HGB BLD-MCNC: 14.2 G/DL (ref 12–15.9)
IMM GRANULOCYTES # BLD AUTO: 0.01 10*3/MM3 (ref 0–0.05)
IMM GRANULOCYTES NFR BLD AUTO: 0.2 % (ref 0–0.5)
LYMPHOCYTES # BLD AUTO: 2.13 10*3/MM3 (ref 0.7–3.1)
LYMPHOCYTES NFR BLD AUTO: 36.7 % (ref 19.6–45.3)
MAGNESIUM SERPL-MCNC: 1.8 MG/DL (ref 1.6–2.6)
MCH RBC QN AUTO: 29.3 PG (ref 26.6–33)
MCHC RBC AUTO-ENTMCNC: 32.1 G/DL (ref 31.5–35.7)
MCV RBC AUTO: 91.5 FL (ref 79–97)
MONOCYTES # BLD AUTO: 0.59 10*3/MM3 (ref 0.1–0.9)
MONOCYTES NFR BLD AUTO: 10.2 % (ref 5–12)
NEUTROPHILS NFR BLD AUTO: 3 10*3/MM3 (ref 1.7–7)
NEUTROPHILS NFR BLD AUTO: 51.7 % (ref 42.7–76)
NRBC BLD AUTO-RTO: 0 /100 WBC (ref 0–0.2)
PHOSPHATE SERPL-MCNC: 3.1 MG/DL (ref 2.5–4.5)
PLATELET # BLD AUTO: 228 10*3/MM3 (ref 140–450)
PMV BLD AUTO: 10.5 FL (ref 6–12)
POTASSIUM SERPL-SCNC: 4 MMOL/L (ref 3.5–5.2)
PROT SERPL-MCNC: 7.4 G/DL (ref 6–8.5)
RBC # BLD AUTO: 4.84 10*6/MM3 (ref 3.77–5.28)
SODIUM SERPL-SCNC: 140 MMOL/L (ref 136–145)
WBC NRBC COR # BLD AUTO: 5.8 10*3/MM3 (ref 3.4–10.8)

## 2024-03-20 PROCEDURE — 85025 COMPLETE CBC W/AUTO DIFF WBC: CPT

## 2024-03-20 PROCEDURE — 84100 ASSAY OF PHOSPHORUS: CPT

## 2024-03-20 PROCEDURE — 96372 THER/PROPH/DIAG INJ SC/IM: CPT

## 2024-03-20 PROCEDURE — 25010000002 DENOSUMAB 60 MG/ML SOLUTION PREFILLED SYRINGE: Performed by: INTERNAL MEDICINE

## 2024-03-20 PROCEDURE — 83735 ASSAY OF MAGNESIUM: CPT

## 2024-03-20 PROCEDURE — 36415 COLL VENOUS BLD VENIPUNCTURE: CPT

## 2024-03-20 PROCEDURE — 80053 COMPREHEN METABOLIC PANEL: CPT

## 2024-03-20 RX ADMIN — DENOSUMAB 60 MG: 60 INJECTION SUBCUTANEOUS at 11:52

## 2024-03-20 NOTE — PROGRESS NOTES
Subjective     REASON FOR CONSULTATION:   1.Left breast cancer pT1cN 0- ER AL positive HER-2 negative low-grade infiltrating ductal carcinoma Oncotype score of 6 postmastectomy and sentinel node biopsy on 8/20/19  2.  Oncotype score of 6 tamoxifen prescribed                               REQUESTING PHYSICIAN:  MD Dannielle Campa MD    History of Present Illness patient is a 51-year-old premenopausal woman with a small hormone positive breast cancer which is low-grade with a low Oncotype score.  Of 6    She was on tamoxifenfor 24  months and was tolerating it well but then was hospitalized at [facility] in April with a ruptured cyst in her left ovary the cause significant pain and then has developed a fairly significant cyst on her right ovary.  Her gynecologist is given her 2 options-either have her ovaries removed or switch to another medication and she opted for bilateral oophorectomy since then she has been on Arimidex and has now been on therapy  4  years    She had her bilateral salpingo-oophorectomy-the path reports that showed no malignancy or atypia.  Fibroid was removed and I am not sure how this showed up unless the fibroid was on the ovary because she has had a hysterectomy in the past  Her last visit I palpated a mass in her right breast and the ultrasound showed a mass which was biopsied and thankfully was just a fibroadenoma      She has a family history of osteoporosis in her mother-we  added Prolia to her calcium and vitamin D and she is here for dose #4      she never had a colonoscopy and she finally had a done in March 2021 and it was benign    She is having a lot of hot flashes with the Arimidex but does not want any medication for it.    We talked about the breast cancer index which is marginal in her situation but she still wanted know the information so we will send it off    Past Medical History:   Diagnosis Date     Breast cancer 05/2019    LEFT BREAST    Fibrocystic breast     History of palpitations     Ovarian cyst rupture 04/2021    PONV (postoperative nausea and vomiting)     GAVE TOO MUCH AFTER HYSTERECTOMY    Seasonal allergies         Past Surgical History:   Procedure Laterality Date    BILATERAL BREAST REDUCTION Right 12/19/2019    Procedure: RIGHT REDUCTION FOR SYMMETRY;  Surgeon: PATRICIO Levine MD;  Location: [facility] MAIN OR;  Service: Plastics    BREAST BIOPSY      BREAST RECONSTRUCTION Left 08/15/2019    Procedure: LEFT PLACEMENT TISSUE EXPANDER WITH ALLORDERM;  Surgeon: PATRICIO Levine MD;  Location: [facility] MAIN OR;  Service: Plastics    BREAST TISSUE EXPANDER REMOVAL INSERTION OF IMPLANT Bilateral 12/19/2019    Procedure: REMOVAL LEFT TISSUE EXPANDER AND PLACEMENT OF IMPLANT;  Surgeon: PATRICIO Levine MD;  Location: [facility] MAIN OR;  Service: Plastics    BUNIONECTOMY Bilateral     BUNIONECTOMY-2005 AND 2015    COLONOSCOPY N/A 03/15/2021    Procedure: COLONOSCOPY TO CECUM AND TI;  Surgeon: Heather Montero MD;  Location: [facility] ENDOSCOPY;  Service: Gastroenterology;  Laterality: N/A;  SCREENING  POST- HEMORRHOIDS    DIAGNOSTIC LAPAROSCOPY Bilateral 08/11/2021    Procedure: DIAGNOSTIC LAPAROSCOPY, SALPINGO OOPHORECTOMY LAPAROSCOPIC;  Surgeon: Gertrude Humphries MD;  Location: [facility] OR OSC;  Service: Obstetrics/Gynecology;  Laterality: Bilateral;    DILATATION AND CURETTAGE  1998    DILATATION AND CURETTAGE  1991    Vaginal warts    HYSTERECTOMY  08/2008    UTEREUS    MASTECTOMY W/ SENTINEL NODE BIOPSY Left 08/15/2019    Procedure: LEFt total mastectomy, LEFT axilla sentinel node biopsy,  immediate recosntruction;  Surgeon: Sarah Barker MD;  Location: [facility] MAIN OR;  Service: General    VAGINAL HYSTERECTOMY  2021    FALLOPIAN TUBES & OVARIES    WISDOM TOOTH EXTRACTION      ONC HISTTORY  patient is a 49-year-old -American female in excellent health on no chronic medications who was noted on her  routine mammogram after 3-year break to have an abnormality in the left breast.  The patient thought she had some tenderness and showed this to her family doctor who sent her for a mammogram and this did show a definite difference from 3 years previously in that a12 mm abnormality was noted in her left breast at the 12:30 position.  Diagnostic mammogram showed in addition  that a to the lesion at 1230 there was another abnormality measuring 7 mm at the 1:00 region and several groups of indistinct calcifications were also noted in this area.  The ultrasound confirmed a solid mass measuring 11 x 10 at 1230 and a 4 x 4 millimeter mass at 4:00 in addition to 2 other cysts biopsy was done.on 2019 with the lesion at 12:00 showing atypical ductal hyperplasia measuring 1 mm negative for invasive cancer in the lesion at 1230 showed invasive mammary carcinoma no special type low-grade measuring 9 mm associated with low-grade DCIS and microcalcifications tumor was 90% ER +94% AZ positive HER-2 negative low Ki-67 of 5.7%  Patient underwent bilateral breast MRI on 2019 and this showed the biopsy-proven mass at the 12 o'clock position measuring 1.5 cm residual mass in the 3 o'clock position marker from the ADH showed no suspicious residual enhancement.  There is no obvious adenopathy in the right breast was benign    Because of the multifocal nature of the disease the patient opted for a left mastectomy with immediate reconstruction and the final pathology specimen showed a 12 x 12 mm low-grade 1 invasive ductal carcinoma with 2-negative sentinel nodes.  There was associated DCIS measuring 8 x 6 mm and margins were all clear    Patient is  4 para 2 1 miscarriage and one  first childbirth was at age 23 she did not breast-feed she was on birth control for 1 year and has not had appeared since  when she had a hysterectomy for adenomyosis.  She is probably premenopausal as she has had no symptoms of  menopause    Family history is completely negative for breast cancer ovarian cancer or any other malignancy that she is aware of father  at 60 of a heart attack mother 73 she has one brother who is healthy.    She is here today to discuss adjuvant treatment her Oncotype score came back at 6 Which I told her was excellent Which predicts no benefit from chemotherapy and distant recurrence at 9 years of 3% with hormonal blockade  We discussed tamoxifen as she is likely premenopausal and the side effect profile  The side effects and toxicities of Tamoxifen were discussed with the patient, including hot flashes, mood swings,depression, DVT and endometrial cancer. A list of drugs that interfere with the efficacy of tamoxifen and are to be avoided were given to the patient.  Estradiol FSH and LH levels have been drawn today to ensure that she is premenopausal to confirm she is premenopausal  Discussed in detail the side effects of tamoxifen and the information from the Oncotype DX score and she is very happy to get away without chemotherapy and agreeable to tamoxifen but she will call me next week to check her menopausal status before taking it.  No radiation is planned       She has been on tamoxifen for about 3 months now and after the first month developed dizziness and somnolence and we asked her to cut back to 10 mg for a couple of weeks but she misunderstood and has stayed on 10 mg and is tolerating this fairly well overall.    When she was seen by Dr. Gooden and 2019 she increased the tamoxifen back up to 20 mg and has been tolerating that well since that time.  She has noticed some increased tearfulness.  She does have upcoming nipple reconstruction and follows up with Dr. Levine tomorrow in preparation for this.  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        Current Outpatient Medications on File Prior to Visit   Medication Sig Dispense Refill    acetaminophen (TYLENOL) 500 MG tablet Take 2 tablets by mouth Every 6 (Six) Hours As Needed for Mild Pain.      anastrozole (ARIMIDEX) 1 MG tablet TAKE 1 TABLET BY MOUTH EVERY DAY 30 tablet 6    Loratadine-D 24HR  MG per 24 hr tablet TAKE 1 TABLET BY MOUTH EVERY DAY 30 tablet 11    Multiple Vitamin (MULTIVITAMIN) tablet Take 1 tablet by mouth Daily.      Nutritional Supplements (VITAMIN D MAINTENANCE PO) Take 1 tablet by mouth Daily.       No current facility-administered medications on file prior to visit.        ALLERGIES:    Allergies   Allergen Reactions    Codeine Rash    Percocet [Oxycodone-Acetaminophen] Itching        Social History     Socioeconomic History    Marital status:     Years of education: College   Tobacco Use    Smoking status: Never    Smokeless tobacco: Never   Vaping Use    Vaping status: Never Used   Substance and Sexual Activity    Alcohol use: Not Currently     Comment: 3 PER MONTH-SOCIALLY    Drug use: No    Sexual activity: Yes     Birth control/protection: Surgical        Family History   Problem Relation Age of Onset    Diabetes Mother     Heart disease Mother     Hypertension Mother     Hyperlipidemia Mother     Cancer Maternal Aunt         OF UNKNOWN ORGIN    Diabetes Maternal Aunt     Hyperlipidemia Maternal Aunt     Ovarian cancer Paternal Aunt     Lung cancer Maternal Grandmother     Brain cancer Maternal Grandmother     Hypertension Father     Heart attack Father     Seizures Brother     Malig Hyperthermia Neg Hx         Review of Systems   Constitutional: Negative.  Negative for appetite change, chills, diaphoresis, fatigue, fever and unexpected weight change.   HENT:  Negative for hearing loss, sore throat and trouble swallowing.    Respiratory:  Negative for cough, chest tightness, shortness of breath and wheezing.    Cardiovascular:   "Negative for chest pain (nerve like pain under left axilla intermittently), palpitations and leg swelling.   Gastrointestinal:  Negative for abdominal distention, abdominal pain, diarrhea, nausea and vomiting.   Genitourinary:  Negative for dysuria, frequency, hematuria and urgency.   Musculoskeletal:  Negative for joint swelling.        No muscle weakness.   Skin:  Negative for rash and wound.   Neurological:  Negative for seizures, syncope, speech difficulty, weakness, numbness and headaches.   Hematological:  Negative for adenopathy. Does not bruise/bleed easily.   Psychiatric/Behavioral:  Negative for behavioral problems, confusion and suicidal ideas. The patient is not nervous/anxious.    All other systems reviewed and are negative.   I have reviewed and confirmed the accuracy of the ROS as documented by the MA/LPN/RN Tanner Gooden MD        Objective     Vitals:    03/20/24 1106   BP: 121/72   Pulse: 86   Resp: 16   Temp: 97.3 °F (36.3 °C)   TempSrc: Temporal   SpO2: 98%   Weight: 82.7 kg (182 lb 6.4 oz)   Height: 174 cm (68.5\")   PainSc: 0-No pain           3/20/2024    11:02 AM   Current Status   ECOG score 0       Physical Exam   Pulmonary/Chest:          GENERAL:  Well-developed, well-nourished in no acute distress.   SKIN:  Warm, dry without rashes, purpura or petechiae.  EYES:  Pupils equal, round and reactive to light.  EOMs intact.  Conjunctivae normal.  EARS:  Hearing intact.  NOSE:  Septum midline.  No excoriations or nasal discharge.  MOUTH:  Tongue is well-papillated; no stomatitis or ulcers.  Lips normal.  THROAT:  Oropharynx without lesions or exudates.  NECK:  Supple with good range of motion; no thyromegaly or masses, no JVD.  LYMPHATICS:  No cervical, supraclavicular, axillary adenopathy.  CHEST:  Lungs clear to auscultation. Good airflow.  BREASTS: Right breast fibroadenoma 7:00 left implant in place with incisions healed.  CARDIAC:  Regular rate and rhythm without murmurs, rubs or " gallops. Normal S1,S2.  ABDOMEN:  Soft,  with no hepatosplenomegaly or masses.  EXTREMITIES:  No clubbing, cyanosis or edema.  NEUROLOGICAL:  Cranial Nerves II-XII grossly intact.  No focal neurological deficits.  PSYCHIATRIC:  Normal affect and mood.      I have reexamined the patient and the results are consistent with the previously documented exam. Tanner Gooden MD       RECENT LABS:  Hematology WBC   Date Value Ref Range Status   03/20/2024 5.80 3.40 - 10.80 10*3/mm3 Final   04/05/2019 7.3 4.5 - 11.0 10*3/uL Final     RBC   Date Value Ref Range Status   03/20/2024 4.84 3.77 - 5.28 10*6/mm3 Final   04/05/2019 4.42 4.0 - 5.2 10*6/uL Final     Hemoglobin   Date Value Ref Range Status   03/20/2024 14.2 12.0 - 15.9 g/dL Final   04/05/2019 13.2 12.0 - 16.0 g/dL Final     Hematocrit   Date Value Ref Range Status   03/20/2024 44.3 34.0 - 46.6 % Final   04/05/2019 40.7 36.0 - 46.0 % Final     Platelets   Date Value Ref Range Status   03/20/2024 228 140 - 450 10*3/mm3 Final   04/05/2019 211 140 - 440 10*3/uL Final            Pathology          Study Result     BILATERAL BREAST MRI WITHOUT AND WITH CONTRAST       IMPRESSION:  1. Biopsy-proven malignancy in the left breast at the 12 o'clock  position with the more posterior of the barrel-shaped metallic clip seen  centrally within the residual mass which measures on the order of 1.5 cm  in greatest dimension. The more anterior barrel-shaped metallic clips is  located within the hematoma. No other suspicious findings are seen  within the left breast and there is no evidence for left axillary  adenopathy.  2. The top hat shaped metallic clip at the 3 o'clock position  corresponds to the site of ADH and is not associated with any residual  suspicious enhancement. The metallic clip is seen within a dominant  hematoma cavity that measures on the order of 4 cm in greatest  dimension.  3. There are no findings suspicious for malignancy in the right breast.     BI-RADS  Category 6: Known malignancy.     This report was finalized on 7/8/2019 11:51 AM by Dr. Harjinder Encinas M.D.               Tissue Pathology Exam: [id number]     Final Diagnosis   1. Right Breast, Reduction Mammoplasty (78.5 grams):               A. Benign skin and breast tissue.               B. No atypical hyperplasia, in situ nor invasive carcinoma identified.     swm/pkm    Electronically signed by Sujey Baig MD on 12/20/2019 at 1504     Final Diagnosis   1. Left and Right Fallopian Tubes and Ovaries, Bilateral Salpingo-oophorectomy:           Benign ovaries and fallopian tubes with                A. Benign ovaries with                            1. Hemorrhagic cysts.                            2. Cortical inclusion cysts.  3. Adhesion to the fallopian tube by one of the ovaries.  4. Corporal albicantia.  5. Hemorrhagic corpus luteal cyst.   B. Benign fallopian tubes with complete cross sections of both fallopian tubes with  1. Paratubal cysts in one fallopian tube.  2. An area of endosalpingiosis in one of the fallopian tubes.      2. Fibroid:               A. Leiomyoma.          Assessment & Plan   1.  T1cN0 low-grade ER OR positive HER-2 negative infiltrating ductal carcinoma left breast with associated DCIS multifocal post mastectomy and sentinel node biopsy-Oncotype score of 6  Tamoxifen prescribed in 9/19-patient cut to 10 mg after 1 month and stayed on this dose till 12/19 when escalated to 20 mg  Patient continues the tamoxifen 20 mg daily.  Post bilateral salpingo-oophorectomy in 8/21 switch to Arimidex  Tolerating Arimidex 3/23    2.   negative 9 gene invitae panel testing    3.  Increased tearfulness.  Improved    4.?  Blood in stool - colonoscopy neg 3/21    5.  Bone density dated 8/22 shows significant osteopenia despite 2 years of tamoxifen calcium vitamin D and exercise we will add Prolia    Plan  1.  Continue Arimidex 1 mg daily   2.   Prolia 60 mg #4 today  3.Follow-up with me  in 6  months.  For Prolia  4.  Mammogram due again in 6/24 DEXA in 8/24  5.  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

## 2024-03-28 LAB
CYTO UR: NORMAL
LAB AP CASE REPORT: NORMAL
LAB AP DIAGNOSIS COMMENT: NORMAL
LAB AP SPECIAL STAINS: NORMAL
LAB AP SYNOPTIC CHECKLIST: NORMAL
Lab: NORMAL
PATH REPORT.ADDENDUM SPEC: NORMAL
PATH REPORT.FINAL DX SPEC: NORMAL
PATH REPORT.GROSS SPEC: NORMAL

## 2024-05-21 ENCOUNTER — TELEPHONE (OUTPATIENT)
Dept: ONCOLOGY | Facility: CLINIC | Age: 54
End: 2024-05-21
Payer: COMMERCIAL

## 2024-05-21 NOTE — TELEPHONE ENCOUNTER
Caller: Екатерина Strauss    Relationship: Self    Best call back number:     [phone]      Caller requesting test results: YES    What test was performed: BIOPSY    When was the test performed: ORIGINALLY PREFORMED IN 2019 (WAS TOLD IT WAS FROM HER ORIGINAL BIOPSY)    Where was the test performed: [facility]    Additional notes:  WAS ADVISED THAT THE RESULTS HAD BEEN SENT BACK TO DR CARVALHO AS OF 04/09/24

## 2024-05-29 NOTE — TELEPHONE ENCOUNTER
Returned call to pt to review results. Left voicemail to return my call to my direct number [phone]

## 2024-05-30 NOTE — TELEPHONE ENCOUNTER
Returned call to pt and reviewed [facility]CI results. Pt may stop her AI in September per Dr. Gooden. Pt was appreciative of the call and the good news.

## 2024-06-17 ENCOUNTER — APPOINTMENT (OUTPATIENT)
Dept: WOMENS IMAGING | Facility: HOSPITAL | Age: 54
End: 2024-06-17
Payer: COMMERCIAL

## 2024-06-17 PROCEDURE — 77063 BREAST TOMOSYNTHESIS BI: CPT | Performed by: RADIOLOGY

## 2024-06-17 PROCEDURE — 77067 SCR MAMMO BI INCL CAD: CPT | Performed by: RADIOLOGY

## 2024-08-12 ENCOUNTER — HOSPITAL ENCOUNTER (OUTPATIENT)
Dept: BONE DENSITY | Facility: HOSPITAL | Age: 54
Discharge: HOME | End: 2024-08-12
Admitting: INTERNAL MEDICINE
Payer: COMMERCIAL

## 2024-08-12 DIAGNOSIS — Z79.811 AROMATASE INHIBITOR USE: ICD-10-CM

## 2024-08-12 PROCEDURE — 77080 DXA BONE DENSITY AXIAL: CPT

## 2024-08-14 ENCOUNTER — TELEPHONE (OUTPATIENT)
Dept: FAMILY MEDICINE CLINIC | Facility: CLINIC | Age: 54
End: 2024-08-14

## 2024-08-14 NOTE — TELEPHONE ENCOUNTER
Caller: Екатерина Strauss    Relationship to patient: Self    Best call back number: [phone]    Date of positive COVID19 test: 08/14    Date of possible COVID19 exposure: 08/12    COVID19 symptoms: BODY ACHES, COUGH, SCRATCHY THROAT    Date of initial quarantine: 08/11    Additional information or concerns: PATIENT IS REQUESTING TO GET THE PAXLOVID AND SOMETHING FOR COUGH IF PAXLOVID DOES NOT HELP WITH IT.    What is the patients preferred pharmacy: [id number] [place], [place] - [facility] [facility] - [phone]  - [phone]

## 2024-08-16 ENCOUNTER — TELEMEDICINE (OUTPATIENT)
Dept: FAMILY MEDICINE CLINIC | Facility: CLINIC | Age: 54
End: 2024-08-16
Payer: COMMERCIAL

## 2024-08-16 DIAGNOSIS — J06.9 UPPER RESPIRATORY TRACT INFECTION DUE TO COVID-19 VIRUS: Primary | ICD-10-CM

## 2024-08-16 DIAGNOSIS — U07.1 UPPER RESPIRATORY TRACT INFECTION DUE TO COVID-19 VIRUS: Primary | ICD-10-CM

## 2024-08-16 PROCEDURE — 99213 OFFICE O/P EST LOW 20 MIN: CPT | Performed by: FAMILY MEDICINE

## 2024-08-16 RX ORDER — BENZONATATE 200 MG/1
200 CAPSULE ORAL 3 TIMES DAILY PRN
Qty: 30 CAPSULE | Refills: 0 | Status: SHIPPED | OUTPATIENT
Start: 2024-08-16

## 2024-08-16 NOTE — PROGRESS NOTES
Subjective   Екатерина Strauss is a 54 y.o. female.     Chief Complaint   Patient presents with    URI       History of Present Illness    Patient was exposed to COVID 4 days ago and then 2 days ago had a positive positive COVID test and her symptoms started.  She is having bodyaches cough and a scratchy throat.  She is interested in Paxlovid.  Good p.o. and urine output.  Is taking robitussin for cough and that is helping.     The following portions of the patient's history were reviewed and updated as appropriate: allergies, current medications, past family history, past medical history, past social history, past surgical history and problem list.    Past Medical History:   Diagnosis Date    Breast cancer 05/2019    LEFT BREAST    Fibrocystic breast     History of palpitations     Ovarian cyst rupture 04/2021    PONV (postoperative nausea and vomiting)     GAVE TOO MUCH AFTER HYSTERECTOMY    Seasonal allergies        Past Surgical History:   Procedure Laterality Date    BILATERAL BREAST REDUCTION Right 12/19/2019    Procedure: RIGHT REDUCTION FOR SYMMETRY;  Surgeon: PATRICIO Levine MD;  Location: [facility] OR;  Service: Plastics    BREAST BIOPSY      BREAST RECONSTRUCTION Left 08/15/2019    Procedure: LEFT PLACEMENT TISSUE EXPANDER WITH ALLORDERM;  Surgeon: PATRICIO Levine MD;  Location: [facility] OR;  Service: Plastics    BREAST TISSUE EXPANDER REMOVAL INSERTION OF IMPLANT Bilateral 12/19/2019    Procedure: REMOVAL LEFT TISSUE EXPANDER AND PLACEMENT OF IMPLANT;  Surgeon: PATRICIO Levine MD;  Location: [facility] OR;  Service: Plastics    BUNIONECTOMY Bilateral     BUNIONECTOMY-2005 AND 2015    COLONOSCOPY N/A 03/15/2021    Procedure: COLONOSCOPY TO CECUM AND TI;  Surgeon: Heather Montero MD;  Location: [facility] ENDOSCOPY;  Service: Gastroenterology;  Laterality: N/A;  SCREENING  POST- HEMORRHOIDS    DIAGNOSTIC LAPAROSCOPY Bilateral 08/11/2021    Procedure: DIAGNOSTIC LAPAROSCOPY, SALPINGO OOPHORECTOMY  LAPAROSCOPIC;  Surgeon: Gertrude Humphries MD;  Location: [facility] OR [facility];  Service: Obstetrics/Gynecology;  Laterality: Bilateral;    DILATATION AND CURETTAGE  1998    DILATATION AND CURETTAGE  1991    Vaginal warts    HYSTERECTOMY  08/2008    UTEREUS    MASTECTOMY W/ SENTINEL NODE BIOPSY Left 08/15/2019    Procedure: LEFt total mastectomy, LEFT axilla sentinel node biopsy,  immediate recosntruction;  Surgeon: Sarah Barker MD;  Location: [facility] MAIN OR;  Service: General    VAGINAL HYSTERECTOMY  2021    FALLOPIAN TUBES & OVARIES    WISDOM TOOTH EXTRACTION         Family History   Problem Relation Age of Onset    Diabetes Mother     Heart disease Mother     Hypertension Mother     Hyperlipidemia Mother     Cancer Maternal Aunt         OF UNKNOWN ORGIN    Diabetes Maternal Aunt     Hyperlipidemia Maternal Aunt     Ovarian cancer Paternal Aunt     Lung cancer Maternal Grandmother     Brain cancer Maternal Grandmother     Hypertension Father     Heart attack Father     Seizures Brother     Malig Hyperthermia Neg Hx        Social History     Socioeconomic History    Marital status:     Years of education: College   Tobacco Use    Smoking status: Never    Smokeless tobacco: Never   Vaping Use    Vaping status: Never Used   Substance and Sexual Activity    Alcohol use: Not Currently     Comment: 3 PER MONTH-SOCIALLY    Drug use: No    Sexual activity: Yes     Birth control/protection: Surgical       Review of Systems   Constitutional:  Negative for fever.   Respiratory:  Negative for shortness of breath.        Objective   Visit Vitals  LMP  (LMP Unknown)     There is no height or weight on file to calculate BMI.  Physical Exam  Constitutional:       General: She is not in acute distress.     Appearance: Normal appearance.   Neurological:      General: No focal deficit present.      Mental Status: She is alert and oriented to person, place, and time.   Psychiatric:         Mood and Affect: Mood normal.          Behavior: Behavior normal.           Assessment & Plan   Diagnoses and all orders for this visit:    1. Upper respiratory tract infection due to COVID-19 virus (Primary)  -     Nirmatrelvir & Ritonavir, 300mg/100mg, (PAXLOVID); Take 3 tablets by mouth 2 (Two) Times a Day for 5 days. Indications: COVID-19 Confirmed Infection  Dispense: 30 tablet; Refill: 0  -     benzonatate (TESSALON) 200 MG capsule; Take 1 capsule by mouth 3 (Three) Times a Day As Needed for Cough.  Dispense: 30 capsule; Refill: 0          Assessment & Plan         Rest, fluids and follow up if worse or no better.   Advised to hold vitamin while on paxlovid.   Discussed quarantine.   Pt and provider in state of [place] and spent 11 minutes.

## 2024-08-27 ENCOUNTER — TELEPHONE (OUTPATIENT)
Dept: ONCOLOGY | Facility: CLINIC | Age: 54
End: 2024-08-27
Payer: COMMERCIAL

## 2024-08-27 NOTE — TELEPHONE ENCOUNTER
Caller: JOÃO    Relationship: NURSE     Best call back number:     [phone]   EXT [phone]    What is the best time to reach you: ANYTIME - WILL PROBABLY GET A VM. PLEASE LEAVE A DIRECT LINE NUMBER SO THAT SHE CAN GET BACK WITH YOU TO DISCUSS.     Who are you requesting to speak with (clinical staff, provider,  specific staff member): NON-CLINICAL    What was the call regarding: THEY HAVE NOT HEARD ANYTHING BACK ABOUT THE CALL PLACED ON  ABOUT THE PT'S PROLIA INJECTION. THEY SPOKE WITH ITZEL AND WAS TRANSFERRED TO NIKKY GAUTAM-CELENA RN AND LEFT A VM.     THE PT HAS AN UPCOMING PROLIA INJECTION ON . THE PT'S PA WILL  BEFORE THEN. THE PAT IS ONLY GOOD FROM 23 - 24.     IF WE WANT THE PT TO CONTINUE TO RECEIVE THE PROLIA INJECTION THEN A REQUEST NEEDS TO BE PUT IN THROUGH [facility] WITH [facility] AND IT NEEDS TO BE REVIEWED.     WILL NEED TO BE PUT IN AS A URGENT MED NEC    PT CAN NOT DO IN THE HOSPITAL IT WILL HAVE TO BE SET UP OUTSIDE OF THE HOSPITAL FOR CONTINUATION AS WELL.     THEY ARE TRYING TO CALL THE PT TO SEE WHERE SHE WOULD LIKE TO HAVE THE INJECTIONS AND WOULD LIKE FOR US TO SEE IF WE CAN GET A HOLD OF THE PT AS WELL TO HAVE HER CALL JOÃO.     PLEASE CALL JOÃO BACK TO DISCUSS

## 2024-09-13 ENCOUNTER — OFFICE VISIT (OUTPATIENT)
Dept: FAMILY MEDICINE CLINIC | Facility: CLINIC | Age: 54
End: 2024-09-13
Payer: COMMERCIAL

## 2024-09-13 VITALS
SYSTOLIC BLOOD PRESSURE: 120 MMHG | HEIGHT: 69 IN | OXYGEN SATURATION: 96 % | DIASTOLIC BLOOD PRESSURE: 72 MMHG | BODY MASS INDEX: 25.8 KG/M2 | TEMPERATURE: 97.5 F | HEART RATE: 95 BPM | WEIGHT: 174.2 LBS

## 2024-09-13 DIAGNOSIS — Z00.00 HEALTHCARE MAINTENANCE: Primary | ICD-10-CM

## 2024-09-13 DIAGNOSIS — Z13.6 SCREENING FOR CARDIOVASCULAR CONDITION: ICD-10-CM

## 2024-09-13 DIAGNOSIS — Z23 IMMUNIZATION DUE: ICD-10-CM

## 2024-09-13 NOTE — PROGRESS NOTES
"Екатерина Strauss is here today for an annual physical exam.     Eating a healthy diet. Exercising routinely.   Wears seat belt. Feels safe at home.   Sexual activity: no-no issues  Birth control: post menopausal   Pregnancy:    She is working on weight loss and has lost 8 pounds!      PHQ-2 Depression Screening  Little interest or pleasure in doing things? 0-->not at all   Feeling down, depressed, or hopeless? 0-->not at all   PHQ-2 Total Score 0         I have reviewed the patient's medical, family, and social history in detail and updated the computerized patient record.    Screening history:  Colonoscopy - utd  Mammogram- utd-follows with oncology,  Pap/pelvic - hysterectomy for benign reasons-follows with gyn  Metabolic - due  Dexa- utd and is on prolia with oncology    Health Maintenance   Topic Date Due    BMI FOLLOWUP  2021    ANNUAL PHYSICAL  2024    COVID-19 Vaccine ( - -24 season) 2024    MAMMOGRAM  2025    COLORECTAL CANCER SCREENING  03/15/2031    TDAP/TD VACCINES (3 - Td or Tdap) 2032    HEPATITIS C SCREENING  Completed    ZOSTER VACCINE  Completed    Pneumococcal Vaccine 0-64  Aged Out    INFLUENZA VACCINE  Discontinued    PAP SMEAR  Discontinued       Review of Systems   Constitutional:  Negative for fever.   HENT:  Negative for hearing loss.    Eyes:  Negative for visual disturbance.   Respiratory:  Negative for shortness of breath.    Cardiovascular:  Negative for chest pain.   Gastrointestinal:  Negative for constipation and diarrhea.   Genitourinary:  Negative for difficulty urinating.   Musculoskeletal:  Negative for arthralgias and myalgias.   Skin:  Negative for rash.   Hematological:  Does not bruise/bleed easily.   Psychiatric/Behavioral:  Negative for dysphoric mood.        /72 (BP Location: Left arm, Patient Position: Sitting, Cuff Size: Adult)   Pulse 95   Temp 97.5 °F (36.4 °C)   Ht 174 cm (68.5\")   Wt 79 kg (174 lb 3.2 oz)   LMP  (LMP " Unknown)   SpO2 96%   BMI 26.10 kg/m²      Physical Exam    Vital signs reviewed.  General appearance: No acute distress  Eyes: conjunctiva clear without erythema; pupils equally round and reactive  ENT: external ears and nose normal; hearing normal, oropharynx clear  Neck: supple; no thyromegaly  CV: normal rate and rhythm; no peripheral edema  Respiratory: normal respiratory effort; lungs clear to auscultation bilaterally  MSK: normal gait and station; no focal joint deformity or swelling  Skin: no rash or wounds; normal turgor  Neuro: cranial nerves 2-12 grossly intact; normal sensation to light touch  Psych: mood and affect normal; recent and remote memory intact    No visits with results within 2 Week(s) from this visit.   Latest known visit with results is:   Lab on 03/20/2024   Component Date Value Ref Range Status    Glucose 03/20/2024 62 (L)  65 - 99 mg/dL Final    BUN 03/20/2024 14  6 - 20 mg/dL Final    Creatinine 03/20/2024 0.85  0.57 - 1.00 mg/dL Final    Sodium 03/20/2024 140  136 - 145 mmol/L Final    Potassium 03/20/2024 4.0  3.5 - 5.2 mmol/L Final    Chloride 03/20/2024 105  98 - 107 mmol/L Final    CO2 03/20/2024 28.7  22.0 - 29.0 mmol/L Final    Calcium 03/20/2024 9.4  8.6 - 10.5 mg/dL Final    Total Protein 03/20/2024 7.4  6.0 - 8.5 g/dL Final    Albumin 03/20/2024 3.9  3.5 - 5.2 g/dL Final    ALT (SGPT) 03/20/2024 6  1 - 33 U/L Final    AST (SGOT) 03/20/2024 14  1 - 32 U/L Final    Alkaline Phosphatase 03/20/2024 68  39 - 117 U/L Final    Total Bilirubin 03/20/2024 0.3  0.0 - 1.2 mg/dL Final    Globulin 03/20/2024 3.5  gm/dL Final    A/G Ratio 03/20/2024 1.1  g/dL Final    BUN/Creatinine Ratio 03/20/2024 16.5  7.0 - 25.0 Final    Anion Gap 03/20/2024 6.3  5.0 - 15.0 mmol/L Final    eGFR 03/20/2024 82.0  >60.0 mL/min/1.73 Final    Magnesium 03/20/2024 1.8  1.6 - 2.6 mg/dL Final    Phosphorus 03/20/2024 3.1  2.5 - 4.5 mg/dL Final    WBC 03/20/2024 5.80  3.40 - 10.80 10*3/mm3 Final    RBC  03/20/2024 4.84  3.77 - 5.28 10*6/mm3 Final    Hemoglobin 03/20/2024 14.2  12.0 - 15.9 g/dL Final    Hematocrit 03/20/2024 44.3  34.0 - 46.6 % Final    MCV 03/20/2024 91.5  79.0 - 97.0 fL Final    MCH 03/20/2024 29.3  26.6 - 33.0 pg Final    MCHC 03/20/2024 32.1  31.5 - 35.7 g/dL Final    RDW 03/20/2024 13.2  12.3 - 15.4 % Final    RDW-SD 03/20/2024 44.8  37.0 - 54.0 fl Final    MPV 03/20/2024 10.5  6.0 - 12.0 fL Final    Platelets 03/20/2024 228  140 - 450 10*3/mm3 Final    Neutrophil % 03/20/2024 51.7  42.7 - 76.0 % Final    Lymphocyte % 03/20/2024 36.7  19.6 - 45.3 % Final    Monocyte % 03/20/2024 10.2  5.0 - 12.0 % Final    Eosinophil % 03/20/2024 0.7  0.3 - 6.2 % Final    Basophil % 03/20/2024 0.5  0.0 - 1.5 % Final    Immature Grans % 03/20/2024 0.2  0.0 - 0.5 % Final    Neutrophils, Absolute 03/20/2024 3.00  1.70 - 7.00 10*3/mm3 Final    Lymphocytes, Absolute 03/20/2024 2.13  0.70 - 3.10 10*3/mm3 Final    Monocytes, Absolute 03/20/2024 0.59  0.10 - 0.90 10*3/mm3 Final    Eosinophils, Absolute 03/20/2024 0.04  0.00 - 0.40 10*3/mm3 Final    Basophils, Absolute 03/20/2024 0.03  0.00 - 0.20 10*3/mm3 Final    Immature Grans, Absolute 03/20/2024 0.01  0.00 - 0.05 10*3/mm3 Final    nRBC 03/20/2024 0.0  0.0 - 0.2 /100 WBC Final         Current Outpatient Medications:     acetaminophen (TYLENOL) 500 MG tablet, Take 2 tablets by mouth Every 6 (Six) Hours As Needed for Mild Pain., Disp: , Rfl:     anastrozole (ARIMIDEX) 1 MG tablet, TAKE 1 TABLET BY MOUTH EVERY DAY, Disp: 30 tablet, Rfl: 6    Loratadine-D 24HR  MG per 24 hr tablet, TAKE 1 TABLET BY MOUTH EVERY DAY, Disp: 30 tablet, Rfl: 11    Multiple Vitamin (MULTIVITAMIN) tablet, Take 1 tablet by mouth Daily., Disp: , Rfl:     Nutritional Supplements (VITAMIN D MAINTENANCE PO), Take 1 tablet by mouth Daily., Disp: , Rfl:     Diagnoses and all orders for this visit:    1. Healthcare maintenance (Primary)    2. Screening for cardiovascular condition  -      Comprehensive Metabolic Panel  -     Lipid Panel    3. Immunization due  -     COVID-19 F23 (Pfizer) 12yrs+ (COMIRNATY)        Encourage healthy diet and exercise.  Encourage patient to stay up to date on screening examinations as indicated based on age and risk factors. F/U yearly.

## 2024-09-14 LAB
ALBUMIN SERPL-MCNC: 4 G/DL (ref 3.5–5.2)
ALBUMIN/GLOB SERPL: 1.3 G/DL
ALP SERPL-CCNC: 72 U/L (ref 39–117)
ALT SERPL-CCNC: 8 U/L (ref 1–33)
AST SERPL-CCNC: 15 U/L (ref 1–32)
BILIRUB SERPL-MCNC: 0.3 MG/DL (ref 0–1.2)
BUN SERPL-MCNC: 17 MG/DL (ref 6–20)
BUN/CREAT SERPL: 18.3 (ref 7–25)
CALCIUM SERPL-MCNC: 9.9 MG/DL (ref 8.6–10.5)
CHLORIDE SERPL-SCNC: 103 MMOL/L (ref 98–107)
CHOLEST SERPL-MCNC: 201 MG/DL (ref 0–200)
CO2 SERPL-SCNC: 30.9 MMOL/L (ref 22–29)
CREAT SERPL-MCNC: 0.93 MG/DL (ref 0.57–1)
EGFRCR SERPLBLD CKD-EPI 2021: 73.2 ML/MIN/1.73
GLOBULIN SER CALC-MCNC: 3.2 GM/DL
GLUCOSE SERPL-MCNC: 78 MG/DL (ref 65–99)
HDLC SERPL-MCNC: 88 MG/DL (ref 40–60)
LDLC SERPL CALC-MCNC: 103 MG/DL (ref 0–100)
POTASSIUM SERPL-SCNC: 4.2 MMOL/L (ref 3.5–5.2)
PROT SERPL-MCNC: 7.2 G/DL (ref 6–8.5)
SODIUM SERPL-SCNC: 143 MMOL/L (ref 136–145)
TRIGL SERPL-MCNC: 51 MG/DL (ref 0–150)
VLDLC SERPL CALC-MCNC: 10 MG/DL (ref 5–40)

## 2024-09-25 ENCOUNTER — OFFICE VISIT (OUTPATIENT)
Dept: ONCOLOGY | Facility: CLINIC | Age: 54
End: 2024-09-25
Payer: COMMERCIAL

## 2024-09-25 ENCOUNTER — LAB (OUTPATIENT)
Dept: LAB | Facility: HOSPITAL | Age: 54
End: 2024-09-25
Payer: COMMERCIAL

## 2024-09-25 ENCOUNTER — INFUSION (OUTPATIENT)
Dept: ONCOLOGY | Facility: HOSPITAL | Age: 54
End: 2024-09-25
Payer: COMMERCIAL

## 2024-09-25 VITALS
WEIGHT: 175 LBS | SYSTOLIC BLOOD PRESSURE: 129 MMHG | HEIGHT: 69 IN | TEMPERATURE: 97.4 F | DIASTOLIC BLOOD PRESSURE: 84 MMHG | RESPIRATION RATE: 16 BRPM | HEART RATE: 83 BPM | BODY MASS INDEX: 25.92 KG/M2 | OXYGEN SATURATION: 99 %

## 2024-09-25 DIAGNOSIS — Z17.0 MALIGNANT NEOPLASM OF UPPER-OUTER QUADRANT OF LEFT BREAST IN FEMALE, ESTROGEN RECEPTOR POSITIVE: Primary | ICD-10-CM

## 2024-09-25 DIAGNOSIS — C50.412 MALIGNANT NEOPLASM OF UPPER-OUTER QUADRANT OF LEFT BREAST IN FEMALE, ESTROGEN RECEPTOR POSITIVE: ICD-10-CM

## 2024-09-25 DIAGNOSIS — Z79.811 AROMATASE INHIBITOR USE: ICD-10-CM

## 2024-09-25 DIAGNOSIS — Z12.11 ENCOUNTER FOR SCREENING FOR MALIGNANT NEOPLASM OF COLON: ICD-10-CM

## 2024-09-25 DIAGNOSIS — Z79.811 AROMATASE INHIBITOR USE: Primary | ICD-10-CM

## 2024-09-25 DIAGNOSIS — Z17.0 MALIGNANT NEOPLASM OF UPPER-OUTER QUADRANT OF LEFT BREAST IN FEMALE, ESTROGEN RECEPTOR POSITIVE: ICD-10-CM

## 2024-09-25 DIAGNOSIS — C50.412 MALIGNANT NEOPLASM OF UPPER-OUTER QUADRANT OF LEFT BREAST IN FEMALE, ESTROGEN RECEPTOR POSITIVE: Primary | ICD-10-CM

## 2024-09-25 LAB
ALBUMIN SERPL-MCNC: 4.2 G/DL (ref 3.5–5.2)
ALBUMIN/GLOB SERPL: 1.2 G/DL
ALP SERPL-CCNC: 75 U/L (ref 39–117)
ALT SERPL W P-5'-P-CCNC: 7 U/L (ref 1–33)
ANION GAP SERPL CALCULATED.3IONS-SCNC: 10.3 MMOL/L (ref 5–15)
AST SERPL-CCNC: 16 U/L (ref 1–32)
BASOPHILS # BLD AUTO: 0.04 10*3/MM3 (ref 0–0.2)
BASOPHILS NFR BLD AUTO: 0.7 % (ref 0–1.5)
BILIRUB SERPL-MCNC: 0.4 MG/DL (ref 0–1.2)
BUN SERPL-MCNC: 12 MG/DL (ref 6–20)
BUN/CREAT SERPL: 12.8 (ref 7–25)
CALCIUM SPEC-SCNC: 9.8 MG/DL (ref 8.6–10.5)
CHLORIDE SERPL-SCNC: 105 MMOL/L (ref 98–107)
CO2 SERPL-SCNC: 28.7 MMOL/L (ref 22–29)
CREAT SERPL-MCNC: 0.94 MG/DL (ref 0.57–1)
DEPRECATED RDW RBC AUTO: 43.9 FL (ref 37–54)
EGFRCR SERPLBLD CKD-EPI 2021: 72.3 ML/MIN/1.73
EOSINOPHIL # BLD AUTO: 0.06 10*3/MM3 (ref 0–0.4)
EOSINOPHIL NFR BLD AUTO: 1 % (ref 0.3–6.2)
ERYTHROCYTE [DISTWIDTH] IN BLOOD BY AUTOMATED COUNT: 13.2 % (ref 12.3–15.4)
GLOBULIN UR ELPH-MCNC: 3.4 GM/DL
GLUCOSE SERPL-MCNC: 87 MG/DL (ref 65–99)
HCT VFR BLD AUTO: 46.3 % (ref 34–46.6)
HGB BLD-MCNC: 14.6 G/DL (ref 12–15.9)
IMM GRANULOCYTES # BLD AUTO: 0.01 10*3/MM3 (ref 0–0.05)
IMM GRANULOCYTES NFR BLD AUTO: 0.2 % (ref 0–0.5)
LYMPHOCYTES # BLD AUTO: 2.35 10*3/MM3 (ref 0.7–3.1)
LYMPHOCYTES NFR BLD AUTO: 39.2 % (ref 19.6–45.3)
MAGNESIUM SERPL-MCNC: 2 MG/DL (ref 1.6–2.6)
MCH RBC QN AUTO: 28.4 PG (ref 26.6–33)
MCHC RBC AUTO-ENTMCNC: 31.5 G/DL (ref 31.5–35.7)
MCV RBC AUTO: 90.1 FL (ref 79–97)
MONOCYTES # BLD AUTO: 0.47 10*3/MM3 (ref 0.1–0.9)
MONOCYTES NFR BLD AUTO: 7.8 % (ref 5–12)
NEUTROPHILS NFR BLD AUTO: 3.06 10*3/MM3 (ref 1.7–7)
NEUTROPHILS NFR BLD AUTO: 51.1 % (ref 42.7–76)
NRBC BLD AUTO-RTO: 0 /100 WBC (ref 0–0.2)
PHOSPHATE SERPL-MCNC: 3.8 MG/DL (ref 2.5–4.5)
PLATELET # BLD AUTO: 230 10*3/MM3 (ref 140–450)
PMV BLD AUTO: 10.8 FL (ref 6–12)
POTASSIUM SERPL-SCNC: 4.6 MMOL/L (ref 3.5–5.2)
PROT SERPL-MCNC: 7.6 G/DL (ref 6–8.5)
RBC # BLD AUTO: 5.14 10*6/MM3 (ref 3.77–5.28)
SODIUM SERPL-SCNC: 144 MMOL/L (ref 136–145)
WBC NRBC COR # BLD AUTO: 5.99 10*3/MM3 (ref 3.4–10.8)

## 2024-09-25 PROCEDURE — 36415 COLL VENOUS BLD VENIPUNCTURE: CPT

## 2024-09-25 PROCEDURE — 84100 ASSAY OF PHOSPHORUS: CPT

## 2024-09-25 PROCEDURE — 80053 COMPREHEN METABOLIC PANEL: CPT

## 2024-09-25 PROCEDURE — 25010000002 DENOSUMAB 60 MG/ML SOLUTION PREFILLED SYRINGE: Performed by: INTERNAL MEDICINE

## 2024-09-25 PROCEDURE — 83735 ASSAY OF MAGNESIUM: CPT

## 2024-09-25 PROCEDURE — 96372 THER/PROPH/DIAG INJ SC/IM: CPT

## 2024-09-25 PROCEDURE — 85025 COMPLETE CBC W/AUTO DIFF WBC: CPT

## 2024-09-25 RX ADMIN — DENOSUMAB 60 MG: 60 INJECTION SUBCUTANEOUS at 10:59

## 2025-03-03 DIAGNOSIS — J30.1 SEASONAL ALLERGIC RHINITIS DUE TO POLLEN: ICD-10-CM

## 2025-03-04 RX ORDER — LORATADINE/PSEUDOEPHEDRINE 10MG-240MG
1 TABLET, EXTENDED RELEASE 24 HR ORAL DAILY
Qty: 30 TABLET | Refills: 5 | Status: SHIPPED | OUTPATIENT
Start: 2025-03-04

## 2025-03-26 ENCOUNTER — INFUSION (OUTPATIENT)
Dept: ONCOLOGY | Facility: HOSPITAL | Age: 55
End: 2025-03-26
Payer: COMMERCIAL

## 2025-03-26 ENCOUNTER — LAB (OUTPATIENT)
Dept: LAB | Facility: HOSPITAL | Age: 55
End: 2025-03-26
Payer: COMMERCIAL

## 2025-03-26 DIAGNOSIS — C50.412 MALIGNANT NEOPLASM OF UPPER-OUTER QUADRANT OF LEFT BREAST IN FEMALE, ESTROGEN RECEPTOR POSITIVE: Primary | ICD-10-CM

## 2025-03-26 DIAGNOSIS — C50.412 MALIGNANT NEOPLASM OF UPPER-OUTER QUADRANT OF LEFT BREAST IN FEMALE, ESTROGEN RECEPTOR POSITIVE: ICD-10-CM

## 2025-03-26 DIAGNOSIS — Z17.0 MALIGNANT NEOPLASM OF UPPER-OUTER QUADRANT OF LEFT BREAST IN FEMALE, ESTROGEN RECEPTOR POSITIVE: Primary | ICD-10-CM

## 2025-03-26 DIAGNOSIS — Z79.811 AROMATASE INHIBITOR USE: ICD-10-CM

## 2025-03-26 DIAGNOSIS — Z17.0 MALIGNANT NEOPLASM OF UPPER-OUTER QUADRANT OF LEFT BREAST IN FEMALE, ESTROGEN RECEPTOR POSITIVE: ICD-10-CM

## 2025-03-26 LAB
ALBUMIN SERPL-MCNC: 4 G/DL (ref 3.5–5.2)
ALBUMIN/GLOB SERPL: 1.2 G/DL
ALP SERPL-CCNC: 64 U/L (ref 39–117)
ALT SERPL W P-5'-P-CCNC: 11 U/L (ref 1–33)
ANION GAP SERPL CALCULATED.3IONS-SCNC: 9.8 MMOL/L (ref 5–15)
AST SERPL-CCNC: 14 U/L (ref 1–32)
BASOPHILS # BLD AUTO: 0.04 10*3/MM3 (ref 0–0.2)
BASOPHILS NFR BLD AUTO: 0.6 % (ref 0–1.5)
BILIRUB SERPL-MCNC: 0.3 MG/DL (ref 0–1.2)
BUN SERPL-MCNC: 12 MG/DL (ref 6–20)
BUN/CREAT SERPL: 14.1 (ref 7–25)
CALCIUM SPEC-SCNC: 9.5 MG/DL (ref 8.6–10.5)
CHLORIDE SERPL-SCNC: 103 MMOL/L (ref 98–107)
CO2 SERPL-SCNC: 28.2 MMOL/L (ref 22–29)
CREAT SERPL-MCNC: 0.85 MG/DL (ref 0.57–1)
DEPRECATED RDW RBC AUTO: 42.6 FL (ref 37–54)
EGFRCR SERPLBLD CKD-EPI 2021: 81.5 ML/MIN/1.73
EOSINOPHIL # BLD AUTO: 0.06 10*3/MM3 (ref 0–0.4)
EOSINOPHIL NFR BLD AUTO: 0.8 % (ref 0.3–6.2)
ERYTHROCYTE [DISTWIDTH] IN BLOOD BY AUTOMATED COUNT: 13.1 % (ref 12.3–15.4)
GLOBULIN UR ELPH-MCNC: 3.3 GM/DL
GLUCOSE SERPL-MCNC: 93 MG/DL (ref 65–99)
HCT VFR BLD AUTO: 44.2 % (ref 34–46.6)
HGB BLD-MCNC: 14.1 G/DL (ref 12–15.9)
IMM GRANULOCYTES # BLD AUTO: 0.02 10*3/MM3 (ref 0–0.05)
IMM GRANULOCYTES NFR BLD AUTO: 0.3 % (ref 0–0.5)
LYMPHOCYTES # BLD AUTO: 3.47 10*3/MM3 (ref 0.7–3.1)
LYMPHOCYTES NFR BLD AUTO: 48.2 % (ref 19.6–45.3)
MAGNESIUM SERPL-MCNC: 1.9 MG/DL (ref 1.6–2.6)
MCH RBC QN AUTO: 28.6 PG (ref 26.6–33)
MCHC RBC AUTO-ENTMCNC: 31.9 G/DL (ref 31.5–35.7)
MCV RBC AUTO: 89.7 FL (ref 79–97)
MONOCYTES # BLD AUTO: 0.54 10*3/MM3 (ref 0.1–0.9)
MONOCYTES NFR BLD AUTO: 7.5 % (ref 5–12)
NEUTROPHILS NFR BLD AUTO: 3.07 10*3/MM3 (ref 1.7–7)
NEUTROPHILS NFR BLD AUTO: 42.6 % (ref 42.7–76)
NRBC BLD AUTO-RTO: 0 /100 WBC (ref 0–0.2)
PHOSPHATE SERPL-MCNC: 4.1 MG/DL (ref 2.5–4.5)
PLATELET # BLD AUTO: 221 10*3/MM3 (ref 140–450)
PMV BLD AUTO: 10.5 FL (ref 6–12)
POTASSIUM SERPL-SCNC: 4.1 MMOL/L (ref 3.5–5.2)
PROT SERPL-MCNC: 7.3 G/DL (ref 6–8.5)
RBC # BLD AUTO: 4.93 10*6/MM3 (ref 3.77–5.28)
SODIUM SERPL-SCNC: 141 MMOL/L (ref 136–145)
WBC NRBC COR # BLD AUTO: 7.2 10*3/MM3 (ref 3.4–10.8)

## 2025-03-26 PROCEDURE — 36415 COLL VENOUS BLD VENIPUNCTURE: CPT

## 2025-03-26 PROCEDURE — 84100 ASSAY OF PHOSPHORUS: CPT

## 2025-03-26 PROCEDURE — 85025 COMPLETE CBC W/AUTO DIFF WBC: CPT

## 2025-03-26 PROCEDURE — 25010000002 DENOSUMAB 60 MG/ML SOLUTION PREFILLED SYRINGE: Performed by: INTERNAL MEDICINE

## 2025-03-26 PROCEDURE — 96372 THER/PROPH/DIAG INJ SC/IM: CPT

## 2025-03-26 PROCEDURE — 83735 ASSAY OF MAGNESIUM: CPT

## 2025-03-26 PROCEDURE — 80053 COMPREHEN METABOLIC PANEL: CPT

## 2025-03-26 RX ADMIN — DENOSUMAB 60 MG: 60 INJECTION SUBCUTANEOUS at 08:53

## 2025-06-18 ENCOUNTER — APPOINTMENT (OUTPATIENT)
Dept: WOMENS IMAGING | Facility: HOSPITAL | Age: 55
End: 2025-06-18
Payer: COMMERCIAL

## 2025-06-18 PROCEDURE — 77067 SCR MAMMO BI INCL CAD: CPT | Performed by: RADIOLOGY

## 2025-06-18 PROCEDURE — 77063 BREAST TOMOSYNTHESIS BI: CPT | Performed by: RADIOLOGY

## 2025-07-20 ENCOUNTER — HOSPITAL ENCOUNTER (OUTPATIENT)
Dept: MRI IMAGING | Facility: HOSPITAL | Age: 55
Discharge: HOME | End: 2025-07-20
Admitting: INTERNAL MEDICINE
Payer: COMMERCIAL

## 2025-07-20 DIAGNOSIS — Z79.811 AROMATASE INHIBITOR USE: ICD-10-CM

## 2025-07-20 DIAGNOSIS — Z17.0 MALIGNANT NEOPLASM OF UPPER-OUTER QUADRANT OF LEFT BREAST IN FEMALE, ESTROGEN RECEPTOR POSITIVE: ICD-10-CM

## 2025-07-20 DIAGNOSIS — C50.412 MALIGNANT NEOPLASM OF UPPER-OUTER QUADRANT OF LEFT BREAST IN FEMALE, ESTROGEN RECEPTOR POSITIVE: ICD-10-CM

## 2025-07-20 DIAGNOSIS — Z12.11 ENCOUNTER FOR SCREENING FOR MALIGNANT NEOPLASM OF COLON: ICD-10-CM

## 2025-07-20 PROCEDURE — 77049 MRI BREAST C-+ W/CAD BI: CPT

## 2025-07-20 PROCEDURE — 25510000002 GADOBENATE DIMEGLUMINE 529 MG/ML SOLUTION: Performed by: INTERNAL MEDICINE

## 2025-07-20 PROCEDURE — A9577 INJ MULTIHANCE: HCPCS | Performed by: INTERNAL MEDICINE

## 2025-07-20 PROCEDURE — 76014 MR SFTY IMPLT&/FB ASMT STF 1: CPT

## 2025-07-20 RX ADMIN — GADOBENATE DIMEGLUMINE 16 ML: 529 INJECTION, SOLUTION INTRAVENOUS at 10:34

## (undated) DEVICE — APPL CHLORAPREP W/TINT 26ML ORNG

## (undated) DEVICE — INTENDED FOR TISSUE SEPARATION, AND OTHER PROCEDURES THAT REQUIRE A SHARP SURGICAL BLADE TO PUNCTURE OR CUT.: Brand: BARD-PARKER ® CARBON RIB-BACK BLADES

## (undated) DEVICE — ENDOPATH XCEL BLUNT TIP TROCARS WITH SMOOTH SLEEVES: Brand: ENDOPATH XCEL

## (undated) DEVICE — 3M™ STERI-STRIP™ REINFORCED ADHESIVE SKIN CLOSURES, R1547, 1/2 IN X 4 IN (12 MM X 100 MM), 6 STRIPS/ENVELOPE: Brand: 3M™ STERI-STRIP™

## (undated) DEVICE — DRSNG SURESITE WNDW 4X4.5

## (undated) DEVICE — ADHS SKIN DERMABOND TOP ADVANCED

## (undated) DEVICE — ANTIBACTERIAL UNDYED BRAIDED (POLYGLACTIN 910), SYNTHETIC ABSORBABLE SUTURE: Brand: COATED VICRYL

## (undated) DEVICE — 2, DISPOSABLE SUCTION/IRRIGATOR WITH DISPOSABLE TIP: Brand: STRYKEFLOW

## (undated) DEVICE — ADAPT CLN BIOGUARD AIR/H2O DISP

## (undated) DEVICE — GLV SURG BIOGEL LTX PF 7

## (undated) DEVICE — JACKSON-PRATT 100CC BULB RESERVOIR: Brand: CARDINAL HEALTH

## (undated) DEVICE — ELECTRD BLD EXT EDGE/INSUL 6IN

## (undated) DEVICE — GLV SURG PREMIERPRO ORTHO LTX PF SZ8.5 BRN

## (undated) DEVICE — PENCL E/S HNDSWTCH SMOKEEVAC HOLSTR 10FT

## (undated) DEVICE — 40585 XL ADVANCED TRENDELENBURG POSITIONING KIT: Brand: 40585 XL ADVANCED TRENDELENBURG POSITIONING KIT

## (undated) DEVICE — LN SMPL CO2 SHTRM SD STREAM W/M LUER

## (undated) DEVICE — KT ORCA ORCAPOD DISP STRL

## (undated) DEVICE — NDL FLTR 19G 1 1/2 LF

## (undated) DEVICE — SPNG LAP 18X18IN LF STRL PK/5

## (undated) DEVICE — STPLR SKIN VISISTAT WD 35CT

## (undated) DEVICE — EXTRCT STPL SKIN STRL BX/12

## (undated) DEVICE — SPNG GZ WOVN 4X4IN 12PLY 10/BX STRL

## (undated) DEVICE — SOL NS 500ML

## (undated) DEVICE — MARKR SKIN W/RULR AND LBL

## (undated) DEVICE — TUBING, SUCTION, 1/4" X 10', STRAIGHT: Brand: MEDLINE

## (undated) DEVICE — 1010 S-DRAPE TOWEL DRAPE 10/BX: Brand: STERI-DRAPE™

## (undated) DEVICE — SENSR O2 OXIMAX FNGR A/ 18IN NONSTR

## (undated) DEVICE — UNDYED MONOFILAMENT (POLYDIOXANONE), ABSORBABLE SYNTHETIC SURGICAL SUTURE: Brand: PDS

## (undated) DEVICE — BNDG ELAS ELITE V/CLOSE 6IN 5YD LF STRL

## (undated) DEVICE — BIOPATCH™ ANTIMICROBIAL DRESSING WITH CHLORHEXIDINE GLUCONATE IS A HYDROPHILLIC POLYURETHANE ABSORPTIVE FOAM WITH CHLORHEXIDINE GLUCONATE (CHG) WHICH INHIBITS BACTERIAL GROWTH UNDER THE DRESSING. THE DRESSING IS INTENDED TO BE USED TO ABSORB EXUDATE, COVER A WOUND CAUSED BY VASCULAR AND NONVASCULAR PERCUTANEOUS MEDICAL DEVICES DURING SURGERY, AS WELL AS REDUCE LOCAL INFECTION AND COLONIZATION OF MICROORGANISMS.: Brand: BIOPATCH

## (undated) DEVICE — TOTAL TRAY, 16FR 10ML SIL FOLEY, URN: Brand: MEDLINE

## (undated) DEVICE — SOL NACL 0.9PCT 1000ML

## (undated) DEVICE — Device

## (undated) DEVICE — TOWEL,OR,DSP,ST,BLUE,STD,4/PK,20PK/CS: Brand: MEDLINE

## (undated) DEVICE — NDL HYPO PRECISIONGLIDE REG 25G 1 1/2

## (undated) DEVICE — IRRIGATOR BULB ASEPTO 60CC STRL

## (undated) DEVICE — CVR TRANSD CIV FLX TPR 11.9 TO 3.8X61CM

## (undated) DEVICE — ENDOCUT SCISSOR TIP, DISPOSABLE: Brand: RENEW

## (undated) DEVICE — PANTY KNIT MATERN L/XL

## (undated) DEVICE — PAD,ABDOMINAL,8"X10",ST,LF: Brand: MEDLINE

## (undated) DEVICE — SKIN PREP TRAY W/CHG: Brand: MEDLINE INDUSTRIES, INC.

## (undated) DEVICE — CANN O2 ETCO2 FITS ALL CONN CO2 SMPL A/ 7IN DISP LF

## (undated) DEVICE — DRSNG PAD ABD 8X10IN STRL

## (undated) DEVICE — SYR LUERLOK 30CC

## (undated) DEVICE — ASEPTIC FLUID TRANSFER SET: Brand: ASEPTIC FLUID TRANSFER SET

## (undated) DEVICE — TUBING, SUCTION, 1/4" X 20', STRAIGHT: Brand: MEDLINE INDUSTRIES, INC.

## (undated) DEVICE — SUT ETHLN 3/0 PS1 18IN 1663H

## (undated) DEVICE — Device: Brand: FABCO

## (undated) DEVICE — PK UNIV COMPL 40

## (undated) DEVICE — SYR LUERLOK 50ML

## (undated) DEVICE — BIFURCATED DRAIN EXTENSION FOR CLOSED WOUND DRAIN: Brand: AXIOM®

## (undated) DEVICE — ELECTRD BLD EDGE/INSUL1P 2.4X5.1MM STRL

## (undated) DEVICE — 3M(TM) TEGADERM(TM) TRANSPARENT FILM DRESSING FRAME STYLE 1627: Brand: 3M™ TEGADERM™

## (undated) DEVICE — DRN WND AXIOM W CLOT STOP 10F

## (undated) DEVICE — PK CHST BRST 40

## (undated) DEVICE — DISPOSABLE MONOPOLAR ENDOSCOPIC CORD 10 FT. (3M): Brand: KIRWAN

## (undated) DEVICE — GLV SURG BIOGEL LTX PF 6

## (undated) DEVICE — SUT PDS 2/0 SH 27IN Z317H

## (undated) DEVICE — SUT VIC 0/0 UR6 27IN DYED J603H

## (undated) DEVICE — PK LAP GYN TOWER 40

## (undated) DEVICE — NDL BLNT 18G 1 1/2IN

## (undated) DEVICE — GLV SURG SENSICARE POLYISPRN W/ALOE PF LF 6.5 GRN STRL

## (undated) DEVICE — ENDOPATH XCEL DILATING TIP TROCARS WITH STABILITY SLEEVES: Brand: ENDOPATH XCEL

## (undated) DEVICE — NDL SPINE 20G 3 1/2 YEL STRL 1P/U

## (undated) DEVICE — ADHS SKIN SURG TISS VISC PREMIERPRO EXOFIN HI/VISC FAST/DRY

## (undated) DEVICE — SYS ENSING FLUID M/ ADD MAC1001

## (undated) DEVICE — GOWN,NON-REINFORCED,SIRUS,SET IN SLV,XL: Brand: MEDLINE

## (undated) DEVICE — THE TORRENT IRRIGATION SCOPE CONNECTOR IS USED WITH THE TORRENT IRRIGATION TUBING TO PROVIDE IRRIGATION FLUIDS SUCH AS STERILE WATER DURING GASTROINTESTINAL ENDOSCOPIC PROCEDURES WHEN USED IN CONJUNCTION WITH AN IRRIGATION PUMP (OR ELECTROSURGICAL UNIT).: Brand: TORRENT

## (undated) DEVICE — SUT VIC 3/0 TIES 18IN J110T

## (undated) DEVICE — MARYLAND JAW LAPAROSCOPIC SEALER/DIVIDER COATED: Brand: LIGASURE

## (undated) DEVICE — DECANT BG O JET

## (undated) DEVICE — CONTAINER,SPECIMEN,OR STERILE,4OZ: Brand: MEDLINE

## (undated) DEVICE — ENDOPOUCH RETRIEVER SPECIMEN RETRIEVAL BAGS: Brand: ENDOPOUCH RETRIEVER

## (undated) DEVICE — PATIENT RETURN ELECTRODE, SINGLE-USE, CONTACT QUALITY MONITORING, ADULT, WITH 9FT CORD, FOR PATIENTS WEIGING OVER 33LBS. (15KG): Brand: MEGADYNE

## (undated) DEVICE — DRSNG TELFA ILND ADH 4X6IN

## (undated) DEVICE — SKIN AFFIX SURG ADHESIVE 72/CS 0.55ML: Brand: MEDLINE

## (undated) DEVICE — SPK PIN NSR FLUID NONVNT 2WY MINI LL LF

## (undated) DEVICE — ELECTRD BLD EXT EDGE 1P COAT 6.5IN STRL